# Patient Record
Sex: FEMALE | Race: WHITE | Employment: OTHER | URBAN - NONMETROPOLITAN AREA
[De-identification: names, ages, dates, MRNs, and addresses within clinical notes are randomized per-mention and may not be internally consistent; named-entity substitution may affect disease eponyms.]

---

## 2017-01-21 DIAGNOSIS — I10 HYPERTENSION GOAL BP (BLOOD PRESSURE) < 130/80: Primary | ICD-10-CM

## 2017-01-23 NOTE — TELEPHONE ENCOUNTER
Cozaar  Last Written Prescription Date: 8/26/2016  Last Fill Quantity: 90, # refills: 3  Last Office Visit with [facility], [facility] or [facility] prescribing provider: 3/3/2016       POTASSIUM   Date Value Ref Range Status   03/03/2016 4.3 3.4 - 5.3 mmol/L Final     CREATININE   Date Value Ref Range Status   03/03/2016 0.78 0.52 - 1.04 mg/dL Final     BP Readings from Last 3 Encounters:   03/28/16 122/86   03/03/16 112/68   04/16/15 113/78

## 2017-01-24 RX ORDER — LOSARTAN POTASSIUM 50 MG/1
TABLET ORAL
Qty: 90 TABLET | Refills: 0 | Status: SHIPPED | OUTPATIENT
Start: 2017-01-24 | End: 2017-03-24

## 2017-01-24 NOTE — TELEPHONE ENCOUNTER
Prescription approved per [facility] Refill Protocol.    Nieves Payne RN  [facility]

## 2017-02-15 DIAGNOSIS — Z93.2 STATUS POST ILEOSTOMY (H): ICD-10-CM

## 2017-02-16 NOTE — TELEPHONE ENCOUNTER
Routing refill request to provider for review/approval because:  Patient needs to be seen because:  Overdue for 6 month check    Nieves Payne RN  [facility]

## 2017-02-16 NOTE — TELEPHONE ENCOUNTER
Metformin          Last Written Prescription Date: 11/17/2016  Last Fill Quantity: 180, # refills: 0  Last Office Visit with [facility], [facility] or [facility] prescribing provider:  3/3/2016        BP Readings from Last 3 Encounters:   03/28/16 122/86   03/03/16 112/68   04/16/15 113/78     Lab Results   Component Value Date    MICROL 6 03/03/2016     No results found for: MICROALBUMIN  Creatinine   Date Value Ref Range Status   03/03/2016 0.78 0.52 - 1.04 mg/dL Final   ]  GFR Estimate   Date Value Ref Range Status   03/03/2016 78 >60 mL/min/1.7m2 Final     Comment:     Non  GFR Calc   02/23/2015 >90  Non  GFR Calc   >60 mL/min/1.7m2 Final   11/25/2014 82 >60 mL/min/1.7m2 Final     Comment:     Non  GFR Calc     GFR Estimate If Black   Date Value Ref Range Status   03/03/2016 >90   GFR Calc   >60 mL/min/1.7m2 Final   02/23/2015 >90   GFR Calc   >60 mL/min/1.7m2 Final   11/25/2014 >90   GFR Calc   >60 mL/min/1.7m2 Final     Lab Results   Component Value Date    CHOL 113 03/03/2016     Lab Results   Component Value Date    HDL 44 03/03/2016     Lab Results   Component Value Date    LDL 48 03/03/2016    LDL 76 08/15/2013     Lab Results   Component Value Date    TRIG 104 03/03/2016     Lab Results   Component Value Date    CHOLHDLRATIO 1.9 02/23/2015     Lab Results   Component Value Date    AST 15 03/03/2016     Lab Results   Component Value Date    ALT 17 03/03/2016     Lab Results   Component Value Date    A1C 6.0 03/03/2016    A1C 5.9 11/25/2014    A1C 6.0 05/01/2014    A1C 5.9 04/19/2013    A1C 6.0 09/04/2012     Potassium   Date Value Ref Range Status   03/03/2016 4.3 3.4 - 5.3 mmol/L Final

## 2017-03-15 DIAGNOSIS — Z93.2 STATUS POST ILEOSTOMY (H): Primary | ICD-10-CM

## 2017-03-16 NOTE — TELEPHONE ENCOUNTER
Ostomy supplies      Last Written Prescription Date: 9/15/16  Last Fill Quantity: 1box,  # refills: 3   Last Office Visit with [facility], [facility] or [facility] prescribing provider: 3/3/16                                         Next 5 appointments (look out 90 days)     Mar 24, 2017 11:00 AM CDT   [facility]   [facility]    [address] [facility] [address]   [phone]                 Nikole Ray MA     3/16/2017

## 2017-03-17 NOTE — TELEPHONE ENCOUNTER
Routing refill request to provider for review/approval because:  Drug not on the [facility] refill protocol     Nieves Payne RN  [facility]

## 2017-03-24 ENCOUNTER — OFFICE VISIT (OUTPATIENT)
Dept: FAMILY MEDICINE | Facility: OTHER | Age: 52
End: 2017-03-24
Payer: MEDICARE

## 2017-03-24 VITALS
HEIGHT: 64 IN | BODY MASS INDEX: 40.97 KG/M2 | WEIGHT: 240 LBS | DIASTOLIC BLOOD PRESSURE: 72 MMHG | TEMPERATURE: 96 F | HEART RATE: 106 BPM | OXYGEN SATURATION: 95 % | SYSTOLIC BLOOD PRESSURE: 112 MMHG

## 2017-03-24 DIAGNOSIS — F20.0 ACUTE EXACERBATION OF CHRONIC PARANOID SCHIZOPHRENIA (H): ICD-10-CM

## 2017-03-24 DIAGNOSIS — E66.01 OBESITY, CLASS III, BMI 40-49.9 (MORBID OBESITY) (H): ICD-10-CM

## 2017-03-24 DIAGNOSIS — Z93.2 ILEOSTOMY STATUS (H): ICD-10-CM

## 2017-03-24 DIAGNOSIS — Z93.2 STATUS POST ILEOSTOMY (H): ICD-10-CM

## 2017-03-24 DIAGNOSIS — I10 HYPERTENSION GOAL BP (BLOOD PRESSURE) < 130/80: ICD-10-CM

## 2017-03-24 DIAGNOSIS — E78.5 HYPERLIPIDEMIA LDL GOAL <100: ICD-10-CM

## 2017-03-24 DIAGNOSIS — E11.9 TYPE 2 DIABETES MELLITUS WITHOUT COMPLICATION, WITHOUT LONG-TERM CURRENT USE OF INSULIN (H): Primary | ICD-10-CM

## 2017-03-24 DIAGNOSIS — E03.4 HYPOTHYROIDISM DUE TO ACQUIRED ATROPHY OF THYROID: ICD-10-CM

## 2017-03-24 DIAGNOSIS — F15.20 METHAMPHETAMINE ADDICTION (H): ICD-10-CM

## 2017-03-24 DIAGNOSIS — F41.1 GENERALIZED ANXIETY DISORDER: ICD-10-CM

## 2017-03-24 LAB
CHOLEST SERPL-MCNC: 125 MG/DL
CREAT SERPL-MCNC: 0.76 MG/DL (ref 0.52–1.04)
CREAT UR-MCNC: 132 MG/DL
GFR SERPL CREATININE-BSD FRML MDRD: 80 ML/MIN/1.7M2
HBA1C MFR BLD: 5.8 % (ref 4.3–6)
HDLC SERPL-MCNC: 45 MG/DL
LDLC SERPL CALC-MCNC: 66 MG/DL
MICROALBUMIN UR-MCNC: 17 MG/L
MICROALBUMIN/CREAT UR: 12.73 MG/G CR (ref 0–25)
NONHDLC SERPL-MCNC: 80 MG/DL
TRIGL SERPL-MCNC: 70 MG/DL
TSH SERPL DL<=0.005 MIU/L-ACNC: 3.72 MU/L (ref 0.4–4)

## 2017-03-24 PROCEDURE — 82565 ASSAY OF CREATININE: CPT | Performed by: FAMILY MEDICINE

## 2017-03-24 PROCEDURE — 82043 UR ALBUMIN QUANTITATIVE: CPT | Performed by: FAMILY MEDICINE

## 2017-03-24 PROCEDURE — 80061 LIPID PANEL: CPT | Performed by: FAMILY MEDICINE

## 2017-03-24 PROCEDURE — 84443 ASSAY THYROID STIM HORMONE: CPT | Performed by: FAMILY MEDICINE

## 2017-03-24 PROCEDURE — 99214 OFFICE O/P EST MOD 30 MIN: CPT | Performed by: FAMILY MEDICINE

## 2017-03-24 PROCEDURE — 36415 COLL VENOUS BLD VENIPUNCTURE: CPT | Performed by: FAMILY MEDICINE

## 2017-03-24 PROCEDURE — 86803 HEPATITIS C AB TEST: CPT | Performed by: FAMILY MEDICINE

## 2017-03-24 PROCEDURE — 83036 HEMOGLOBIN GLYCOSYLATED A1C: CPT | Performed by: FAMILY MEDICINE

## 2017-03-24 RX ORDER — SIMVASTATIN 20 MG
TABLET ORAL
Qty: 90 TABLET | Refills: 3 | Status: SHIPPED | OUTPATIENT
Start: 2017-03-24 | End: 2018-03-23

## 2017-03-24 RX ORDER — SUCRALFATE ORAL 1 G/10ML
SUSPENSION ORAL
Qty: 100 ML | Refills: 6 | Status: SHIPPED | OUTPATIENT
Start: 2017-03-24 | End: 2018-03-08

## 2017-03-24 RX ORDER — LOSARTAN POTASSIUM 50 MG/1
TABLET ORAL
Qty: 90 TABLET | Refills: 3 | Status: SHIPPED | OUTPATIENT
Start: 2017-03-24 | End: 2018-04-10

## 2017-03-24 ASSESSMENT — PAIN SCALES - GENERAL: PAINLEVEL: NO PAIN (0)

## 2017-03-24 NOTE — PROGRESS NOTES
SUBJECTIVE:                                                    Sana Ward is a 52 year old female who presents to clinic today for the following health issues:      Diabetes Follow-up      Patient is checking blood sugars: not at all    Diabetic concerns: None     Symptoms of hypoglycemia (low blood sugar): none     Paresthesias (numbness or burning in feet) or sores: No     Date of last diabetic eye exam: 2 years ago     Hyperlipidemia Follow-Up      Rate your low fat/cholesterol diet?: good    Taking statin?  Yes, no muscle aches from statin    Other lipid medications/supplements?:  none     Hypertension Follow-up      Outpatient blood pressures are not being checked.    Low Salt Diet: low salt       Depression Followup    Status since last visit: Stable     See PHQ-9 for current symptoms.  Other associated symptoms: None    Complicating factors:   Significant life event:  No   Current substance abuse:  None  Anxiety or Panic symptoms:  No    PHQ-9  English PHQ-9   Any Language            Amount of exercise or physical activity: 6-7 days/week for an average of 15-30 minutes    Problems taking medications regularly: No    Medication side effects: none    Diet: low salt and low fat/cholesterol        Problem list and histories reviewed & adjusted, as indicated.  Additional history: none    Patient Active Problem List   Diagnosis     Hypertension goal BP (blood pressure) < 130/80     Hyperlipidemia LDL goal <100     Mental health disorder     Advanced directives, counseling/discussion     Generalized anxiety disorder     Type 2 diabetes mellitus without complication (H)     Hypothyroidism due to acquired atrophy of thyroid     Status post ileostomy (H)     Obesity, Class III, BMI 40-49.9 (morbid obesity) (H)     Methamphetamine addiction (H)     Acute exacerbation of chronic paranoid schizophrenia (H)     Past Surgical History:   Procedure Laterality Date     COLECTOMY  9/11/2001    total colectomy due to  "Crohn's     GI SURGERY  2003    has ostomy bag      HERNIA REPAIR      over 20 years ago       Social History   Substance Use Topics     Smoking status: Never Smoker     Smokeless tobacco: Never Used     Alcohol use No     Family History   Problem Relation Age of Onset     DIABETES Maternal Grandmother      DIABETES Maternal Grandfather          Current Outpatient Prescriptions   Medication Sig Dispense Refill     sucralfate (CARAFATE) 1 GM/10ML suspension APPLY A SMALL AMOUNT TO PARA STOMA 1-2 TIMES DAILY 100 mL 6     order for DME Equipment being ordered: Ostomy Supplies( Premier Drainable Pouch 1.125\") 1 Box 11     simvastatin (ZOCOR) 20 MG tablet TAKE 1 TABLET BY MOUTH RACHEL Y AT BEDTIME 30 tablet 0     metFORMIN (GLUCOPHAGE) 500 MG tablet Take 1 tablet (500 mg) by mouth 2 times daily (with meals) 180 tablet 0     losartan (COZAAR) 50 MG tablet TAKE 1 TABLET BY MOUTH EVER Y DAY 90 tablet 0     Skin Protectants, Misc. (NO STING BARRIER FILM) MISC USE EVERY DAY AS DIRECTED 25 each 3     Ostomy Supplies (ADAPT BARRIER RING 1-3/16\") MISC USE EVERY DAY AS DIRECTED 10 each 3     order for DME Equipment being ordered: Premier Drainable Pouch Misc    Ostomy Supplies 1 Box 3     levothyroxine (SYNTHROID, LEVOTHROID) 50 MCG tablet TAKE 1 TABLET BY MOUTH EVER Y DAY 90 tablet 3     Ostomy Supplies (SKIN GEL PROTECT DRESSING WIPE) PADS USE EVERY DAY AS DIRECTED 50 each 3     order for DME daily Premier Drain Pouch 1.125\" to be used every day as directed. 90 each 3     Ostomy Supplies (ADAPT) PSTE APPLY TO AFFECTED AREA AS D IRECTED 60 g 3     ORDER FOR DME Equipment being ordered: ostomy supplies  Premier Drainable Pouches MISC. 20 each 6     sertraline (ZOLOFT) 100 MG tablet Take 1 tablet (100 mg) by mouth daily 90 tablet 1     traZODone (DESYREL) 100 MG tablet Take 1 tablet (100 mg) by mouth At Bedtime 90 tablet 1     gabapentin (NEURONTIN) 100 MG capsule Take 100 mg capsule in Morning and 200 mg at bedtime. 180 capsule 1 "     haloperidol (HALDOL) 1 MG tablet Take 1 mg by mouth as needed As needed for hallucinations, voices       risperiDONE (RISPERDAL) 3 MG tablet Take 3.0 mg by mouth At Bedtime. 30 tablet 1     risperiDONE (RISPERDAL) 0.5 MG tablet Take 2 tablets (1 mg) by mouth 2 times daily Take 2 Tablets(1 mg) in the AM, and at  2 pm she needs to take 4 mg 30 tablet 1     LORazepam (ATIVAN) 0.25 MG TABS Take 0.25 mg by mouth At Bedtime.  20 tablet 0     ORDER FOR DME [place] ostomy bags to be used as directed. 1 Box 6     ORDER FOR DME Yassine Cohesive seals to be used every day as directed. 1 Box 3     [DISCONTINUED] ORDER FOR DME daily Skin gel protective dressing to be used every day as directed. 50 each 3     [DISCONTINUED] ORDER FOR DME by * route daily as needed Adapt Barrier rings to be used as directed. 30 each 3     [DISCONTINUED] ORDER FOR DME No Sting Barrier Film Wipe to be used every day as directed. 25 each 3     Allergies   Allergen Reactions     Amoxicillin      Contrast Dye      Sulfa Drugs Hives     Recent Labs   Lab Test  03/03/16   0851  02/23/15   1119  11/25/14   1018   05/01/14   1119   A1C  6.0   --   5.9   --   6.0   LDL  48  31  78   --    --    HDL  44*  52  48*   --    --    TRIG  104  76  98   --    --    ALT  17  21  17   --    --    CR  0.78  0.65  0.75   < >  1.22*   GFRESTIMATED  78  >90  Non  GFR Calc    82   < >  47*   GFRESTBLACK  >90   GFR Calc    >90   GFR Calc    >90   GFR Calc     < >  57*   POTASSIUM  4.3  3.7  4.2   --    --    TSH  5.06*  3.73   --    --    --     < > = values in this interval not displayed.      BP Readings from Last 3 Encounters:   03/24/17 112/72   03/28/16 122/86   03/03/16 112/68    Wt Readings from Last 3 Encounters:   03/24/17 240 lb (108.9 kg)   03/03/16 242 lb 3.2 oz (109.9 kg)   04/16/15 235 lb 4.8 oz (106.7 kg)                  Labs reviewed in EPIC    Reviewed and updated as needed this visit by  "clinical staff  Tobacco  Allergies  Meds  Problems  Soc Hx      Reviewed and updated as needed this visit by Provider  Allergies  Meds  Problems         ROS:  C: NEGATIVE for fever, chills, change in weight  E/M: NEGATIVE for ear, mouth and throat problems  R: NEGATIVE for significant cough or SOB  CV: NEGATIVE for chest pain, palpitations or peripheral edema  ROS otherwise negative    OBJECTIVE:                                                    /72 (BP Location: Left arm, Patient Position: Chair, Cuff Size: Adult Large)  Pulse 106  Temp 96  F (35.6  C) (Tympanic)  Ht 5' 3.5\" (1.613 m)  Wt 240 lb (108.9 kg)  SpO2 95%  BMI 41.85 kg/m2  Body mass index is 41.85 kg/(m^2).  GENERAL: alert, no distress and obese  HENT: ear canals and TM's normal, nose and mouth without ulcers or lesions  NECK: no adenopathy, no asymmetry, masses, or scars and thyroid normal to palpation  RESP: lungs clear to auscultation - no rales, rhonchi or wheezes  CV: regular rate and rhythm, normal S1 S2, no S3 or S4, no murmur, click or rub, no peripheral edema and peripheral pulses strong  ABDOMEN: soft, nontender, without hepatosplenomegaly or masses, bowel sounds normal and ileostomy intact  MS: no gross musculoskeletal defects noted, no edema  Diabetic foot exam: normal DP and PT pulses, no trophic changes or ulcerative lesions and normal sensory exam    Diagnostic Test Results:  Results for orders placed or performed in visit on 03/24/17   Hemoglobin A1c   Result Value Ref Range    Hemoglobin A1C 5.8 4.3 - 6.0 %   Lipid Profile with reflex to direct LDL   Result Value Ref Range    Cholesterol 125 <200 mg/dL    Triglycerides 70 <150 mg/dL    HDL Cholesterol 45 (L) >49 mg/dL    LDL Cholesterol Calculated 66 <100 mg/dL    Non HDL Cholesterol 80 <130 mg/dL   Albumin Random Urine Quantitative   Result Value Ref Range    Creatinine Urine 132 mg/dL    Albumin Urine mg/L 17 mg/L    Albumin Urine mg/g Cr 12.73 0 - 25 mg/g Cr "   Creatinine   Result Value Ref Range    Creatinine 0.76 0.52 - 1.04 mg/dL    GFR Estimate 80 >60 mL/min/1.7m2    GFR Estimate If Black >90   GFR Calc   >60 mL/min/1.7m2   TSH with free T4 reflex   Result Value Ref Range    TSH 3.72 0.40 - 4.00 mU/L        ASSESSMENT/PLAN:                                                        1. Status post ileostomy  Chronic stable output and function. Supplies refilled for ongoing management.  - sucralfate (CARAFATE) 1 GM/10ML suspension; APPLY A SMALL AMOUNT TO PARA STOMA 1-2 TIMES DAILY  Dispense: 100 mL; Refill: 6    2. Type 2 diabetes mellitus without complication, without long-term current use of insulin (H)  Chronic controlled. The current medical regimen is effective;  continue present plan and medications. Recheck in 6 months.  - Hemoglobin A1c  - Lipid Profile with reflex to direct LDL  - Albumin Random Urine Quantitative  - Creatinine    3. Obesity, Class III, BMI 40-49.9 (morbid obesity) (H)  Chronic. Stable. Healthy diet and exercise reviewed.  Limit pop and juice intake.  Risks of obesity discussed.  Encourage exercise.      4. Generalized anxiety disorder  Chronic stable. She is currently followed by psychiatry for her medications.  - DEPRESSION ACTION PLAN (DAP)    5. Hypothyroidism due to acquired atrophy of thyroid  Chronic stable. The current medical regimen is effective;  continue present plan and medications.  - TSH with free T4 reflex      7. Hypertension goal BP (blood pressure) < 130/80  Chronic controlled. The current medical regimen is effective;  continue present plan and medications.  - losartan (COZAAR) 50 MG tablet; TAKE 1 TABLET BY MOUTH EVER Y DAY  Dispense: 90 tablet; Refill: 3    8. Hyperlipidemia LDL goal <100  Chronic controlled. The current medical regimen is effective;  continue present plan and medications.  - simvastatin (ZOCOR) 20 MG tablet; TAKE 1 TABLET BY MOUTH RACHEL Y AT BEDTIME  Dispense: 90 tablet; Refill: 3    10.  Acute exacerbation of chronic paranoid schizophrenia (H)  Chronic stable. She is currently followed by psychiatry for her medications.    11. Methamphetamine addiction (H)  Prior history and currently not active problem.      FUTURE LABS:       - Schedule non-fasting labs in 6 months  FUTURE APPOINTMENTS:       - Follow-up visit in 6 months for comprehensive PE and pap smear.  SELF MONITORING:       - Please check blood glucose readings several times per week       - Please check blood pressure readings several times per week  Work on weight loss  Regular exercise    Beck Martinez MD  [facility]

## 2017-03-24 NOTE — MR AVS SNAPSHOT
"              After Visit Summary   3/24/2017    Sana Ward    MRN: [id number]           Patient Information     Date Of Birth          1965        Visit Information        Provider Department      3/24/2017 11:00 AM Beck Martinez MD [facility]        Today's Diagnoses     Type 2 diabetes mellitus without complication, without long-term current use of insulin (H)    -  1    Status post ileostomy        Obesity, Class III, BMI 40-49.9 (morbid obesity) (H)        Generalized anxiety disorder        Hypothyroidism due to acquired atrophy of thyroid        Status post ileostomy (H)        Hypertension goal BP (blood pressure) < 130/80        Hyperlipidemia LDL goal <100        Ileostomy status (H)           Follow-ups after your visit        Follow-up notes from your care team     Return in about 6 months (around 9/24/2017) for Physical Exam, Lab Work pap smear.      Who to contact     If you have questions or need follow up information about today's clinic visit or your schedule please contact [facility] directly at [phone].  Normal or non-critical lab and imaging results will be communicated to you by MagForcehart, letter or phone within 4 business days after the clinic has received the results. If you do not hear from us within 7 days, please contact the clinic through Reduxiot or phone. If you have a critical or abnormal lab result, we will notify you by phone as soon as possible.  Submit refill requests through Proxeon or call your pharmacy and they will forward the refill request to us. Please allow 3 business days for your refill to be completed.          Additional Information About Your Visit        MagForcehart Information     Proxeon lets you send messages to your doctor, view your test results, renew your prescriptions, schedule appointments and more. To sign up, go to [email or web address] . Click on \"Log in\" on the left side of the screen, which will take you to the " "Welcome page. Then click on \"Sign up Now\" on the right side of the page.     You will be asked to enter the access code listed below, as well as some personal information. Please follow the directions to create your username and password.     Your access code is: [email or web address]  Expires: 2017 11:55 AM     Your access code will  in 90 days. If you need help or a new code, please call your [facility] clinic or [phone].        Care EveryWhere ID     This is your Care EveryWhere ID. This could be used by other organizations to access your [facility] medical records  [id number]        Your Vitals Were     Pulse Temperature Height Pulse Oximetry BMI (Body Mass Index)       106 96  F (35.6  C) (Tympanic) 5' 3.5\" (1.613 m) 95% 41.85 kg/m2        Blood Pressure from Last 3 Encounters:   17 112/72   16 122/86   16 112/68    Weight from Last 3 Encounters:   17 240 lb (108.9 kg)   16 242 lb 3.2 oz (109.9 kg)   04/16/15 235 lb 4.8 oz (106.7 kg)              We Performed the Following     Albumin Random Urine Quantitative     Creatinine     DEPRESSION ACTION PLAN (DAP)     Hemoglobin A1c     Hepatitis C antibody     Lipid Profile with reflex to direct LDL     TSH with free T4 reflex          Today's Medication Changes          These changes are accurate as of: 3/24/17 11:55 AM.  If you have any questions, ask your nurse or doctor.               These medicines have changed or have updated prescriptions.        Dose/Directions    losartan 50 MG tablet   Commonly known as:  COZAAR   This may have changed:  See the new instructions.   Used for:  Hypertension goal BP (blood pressure) < 130/80   Changed by:  Beck Martinez MD        TAKE 1 TABLET BY MOUTH EVER Y DAY   Quantity:  90 tablet   Refills:  3       simvastatin 20 MG tablet   Commonly known as:  ZOCOR   This may have changed:  See the new instructions.   Used for:  Hyperlipidemia LDL goal <100, Status post ileostomy (H), Ileostomy " "status (H)   Changed by:  Beck Martinez MD        TAKE 1 TABLET BY MOUTH RACHEL Y AT BEDTIME   Quantity:  90 tablet   Refills:  3            Where to get your medicines      These medications were sent to [id number] - [place], [place] - [address] [place] [address]  [address] [place] [id number]    Hours:  M-F 8:30-6:30; Sat 9-4; closed Sunday Phone:  [phone]     losartan 50 MG tablet    metFORMIN 500 MG tablet    simvastatin 20 MG tablet    sucralfate 1 GM/10ML suspension                Primary Care Provider Office Phone # Fax #    Beck Martinez [phone]       [facility] [address] [facility] [address]        Thank you!     Thank you for choosing [facility]  for your care. Our goal is always to provide you with excellent care. Hearing back from our patients is one way we can continue to improve our services. Please take a few minutes to complete the written survey that you may receive in the mail after your visit with us. Thank you!             Your Updated Medication List - Protect others around you: Learn how to safely use, store and throw away your medicines at www.disposemymeds.org.          This list is accurate as of: 3/24/17 11:55 AM.  Always use your most recent med list.                   Brand Name Dispense Instructions for use    * ADAPT Pste     60 g    APPLY TO AFFECTED AREA AS D IRECTED       * SKIN GEL PROTECT DRESSING WIPE Pads     50 each    USE EVERY DAY AS DIRECTED       * ADAPT BARRIER RING 1-3/16\" Misc     10 each    USE EVERY DAY AS DIRECTED       gabapentin 100 MG capsule    NEURONTIN    180 capsule    Take 100 mg capsule in Morning and 200 mg at bedtime.       haloperidol 1 MG tablet    HALDOL     Take 1 mg by mouth as needed As needed for hallucinations, voices       levothyroxine 50 MCG tablet    SYNTHROID/LEVOTHROID    90 tablet    TAKE 1 TABLET BY MOUTH EVER Y DAY       LORazepam 0.25 MG Tabs half-tab    ATIVAN    20 tablet    Take 0.25 mg " "by mouth At Bedtime.       losartan 50 MG tablet    COZAAR    90 tablet    TAKE 1 TABLET BY MOUTH EVER Y DAY       metFORMIN 500 MG tablet    GLUCOPHAGE    180 tablet    Take 1 tablet (500 mg) by mouth 2 times daily (with meals)       NO STING BARRIER FILM Misc     25 each    USE EVERY DAY AS DIRECTED       * order for DME     1 Box    Yassine Cohesive seals to be used every day as directed.       * order for DME     1 Box    [place] ostomy bags to be used as directed.       * order for DME     20 each    Equipment being ordered: ostomy supplies Premier Drainable Pouches MISC.       * order for DME     90 each    daily Premier Drain Pouch 1.125\" to be used every day as directed.       * order for DME     1 Box    Equipment being ordered: Premier Drainable Pouch Misc  Ostomy Supplies       * order for DME     1 Box    Equipment being ordered: Ostomy Supplies( Premier Drainable Pouch 1.125\")       * risperiDONE 3 MG tablet    risperDAL    30 tablet    Take 3.0 mg by mouth At Bedtime.       * risperiDONE 0.5 MG tablet    risperDAL    30 tablet    Take 2 tablets (1 mg) by mouth 2 times daily Take 2 Tablets(1 mg) in the AM, and at  2 pm she needs to take 4 mg       sertraline 100 MG tablet    ZOLOFT    90 tablet    Take 1 tablet (100 mg) by mouth daily       simvastatin 20 MG tablet    ZOCOR    90 tablet    TAKE 1 TABLET BY MOUTH RACHEL Y AT BEDTIME       sucralfate 1 GM/10ML suspension    CARAFATE    100 mL    APPLY A SMALL AMOUNT TO PARA STOMA 1-2 TIMES DAILY       traZODone 100 MG tablet    DESYREL    90 tablet    Take 1 tablet (100 mg) by mouth At Bedtime       * Notice:  This list has 11 medication(s) that are the same as other medications prescribed for you. Read the directions carefully, and ask your doctor or other care provider to review them with you.      "

## 2017-03-24 NOTE — NURSING NOTE
"Chief Complaint   Patient presents with     Recheck Medication       Initial /72 (BP Location: Left arm, Patient Position: Chair, Cuff Size: Adult Large)  Pulse 106  Temp 96  F (35.6  C) (Tympanic)  Ht 5' 3.5\" (1.613 m)  Wt 240 lb (108.9 kg)  SpO2 95%  BMI 41.85 kg/m2 Estimated body mass index is 41.85 kg/(m^2) as calculated from the following:    Height as of this encounter: 5' 3.5\" (1.613 m).    Weight as of this encounter: 240 lb (108.9 kg).  Medication Reconciliation: complete   Maday SPIVEY      "

## 2017-03-24 NOTE — LETTER
[facility]  [address] [facility] [address]  Phone: [phone]          March 27, 2017    Sana Ward  [address]   [facility] [address]          Dear Sana,      LAB RESULTS:     Lipid profile, A1C, renal function, thyroid function and one time hepatitis c screening are all stable. The current medical regimen is effective;  continue present plan and medications  If you have any further questions or problems, please contact our office.          Sincerely,      Beck Martinez M.D.

## 2017-03-24 NOTE — LETTER
My Depression Action Plan  Name: Sana Ward   Date of Birth 1965  Date: 3/24/2017    My doctor: Beck Martinez   My clinic: [place] [address] [facility] [address] [phone]          GREEN    ZONE   Good Control    What it looks like:     Things are going generally well. You have normal up s and down s. You may even feel depressed from time to time, but bad moods usually last less than a day.   What you need to do:  1. Continue to care for yourself (see self care plan)  2. Check your depression survival kit and update it as needed  3. Follow your physician s recommendations including any medication.  4. Do not stop taking medication unless you consult with your physician first.           YELLOW         ZONE Getting Worse    What it looks like:     Depression is starting to interfere with your life.     It may be hard to get out of bed; you may be starting to isolate yourself from others.    Symptoms of depression are starting to last most all day and this has happened for several days.     You may have suicidal thoughts but they are not constant.   What you need to do:     1. Call your care team, your response to treatment will improve if you keep your care team informed of your progress. Yellow periods are signs an adjustment may need to be made.     2. Continue your self-care, even if you have to fake it!    3. Talk to someone in your support network    4. Open up your depression survival kit           RED    ZONE Medical Alert - Get Help    What it looks like:     Depression is seriously interfering with your life.     You may experience these or other symptoms: You can t get out of bed most days, can t work or engage in other necessary activities, you have trouble taking care of basic hygiene, or basic responsibilities, thoughts of suicide or death that will not go away, self-injurious behavior.     What you need to do:  1. Call your care team and request a  same-day appointment. If they are not available (weekends or after hours) call your local crisis line, emergency room or 911.      Electronically signed by: Maday Rashid, March 24, 2017    Depression Self Care Plan / Survival Kit    Self-Care for Depression  Here s the deal. Your body and mind are really not as separate as most people think.  What you do and think affects how you feel and how you feel influences what you do and think. This means if you do things that people who feel good do, it will help you feel better.  Sometimes this is all it takes.  There is also a place for medication and therapy depending on how severe your depression is, so be sure to consult with your medical provider and/ or Behavioral Health Consultant if your symptoms are worsening or not improving.     In order to better manage my stress, I will:    Exercise  Get some form of exercise, every day. This will help reduce pain and release endorphins, the  feel good  chemicals in your brain. This is almost as good as taking antidepressants!  This is not the same as joining a gym and then never going! (they count on that by the way ) It can be as simple as just going for a walk or doing some gardening, anything that will get you moving.      Hygiene   Maintain good hygiene (Get out of bed in the morning, Make your bed, Brush your teeth, Take a shower, and Get dressed like you were going to work, even if you are unemployed).  If your clothes don't fit try to get ones that do.    Diet  I will strive to eat foods that are good for me, drink plenty of water, and avoid excessive sugar, caffeine, alcohol, and other mood-altering substances.  Some foods that are helpful in depression are: complex carbohydrates, B vitamins, flaxseed, fish or fish oil, fresh fruits and vegetables.    Psychotherapy  I agree to participate in Individual Therapy (if recommended).    Medication  If prescribed medications, I agree to take them.  Missing doses can result  in serious side effects.  I understand that drinking alcohol, or other illicit drug use, may cause potential side effects.  I will not stop my medication abruptly without first discussing it with my provider.    Staying Connected With Others  I will stay in touch with my friends, family members, and my primary care provider/team.    Use your imagination  Be creative.  We all have a creative side; it doesn t matter if it s oil painting, sand castles, or mud pies! This will also kick up the endorphins.    Witness Beauty  (AKA stop and smell the roses) Take a look outside, even in mid-winter. Notice colors, textures. Watch the squirrels and birds.     Service to others  Be of service to others.  There is always someone else in need.  By helping others we can  get out of ourselves  and remember the really important things.  This also provides opportunities for practicing all the other parts of the program.    Humor  Laugh and be silly!  Adjust your TV habits for less news and crime-drama and more comedy.    Control your stress  Try breathing deep, massage therapy, biofeedback, and meditation. Find time to relax each day.     My support system    Clinic Contact:  Phone number:    Contact 1:  Phone number:    Contact 2:  Phone number:    [employer]ri[employer]/:  Phone number:    Therapist:  Phone number:    Local crisis center:    Phone number:    Other community support:  Phone number:

## 2017-03-25 ASSESSMENT — PATIENT HEALTH QUESTIONNAIRE - PHQ9: SUM OF ALL RESPONSES TO PHQ QUESTIONS 1-9: 0

## 2017-03-26 RX ORDER — LEVOTHYROXINE SODIUM 50 UG/1
TABLET ORAL
Qty: 90 TABLET | Refills: 3 | Status: SHIPPED | OUTPATIENT
Start: 2017-03-26 | End: 2018-05-07

## 2017-03-27 LAB — HCV AB SERPL QL IA: NORMAL

## 2017-03-27 NOTE — PROGRESS NOTES
Please notify patient, call or letter.  Lipid profile, A1C, renal function, thyroid function and one time hepatitis c screening are all stable. The current medical regimen is effective;  continue present plan and medications.  Electronically signed by Beck Martinez MD

## 2017-04-27 DIAGNOSIS — Z93.2 STATUS POST ILEOSTOMY (H): ICD-10-CM

## 2017-04-28 RX ORDER — OSTOMY SUPPLY
PASTE (GRAM) MISCELLANEOUS
Qty: 60 EACH | Refills: 3 | Status: SHIPPED | OUTPATIENT
Start: 2017-04-28 | End: 2017-09-14

## 2017-04-28 NOTE — TELEPHONE ENCOUNTER
Ostomy supplies      Last Written Prescription Date:  10/15/15  Last Fill Quantity: 60 g,   # refills: 3  Last Office Visit with [facility] prescribing provider: 3/24/17  Future Office visit:       Routing refill request to provider for review/approval because:  Drug not on the [facility] refill protocol or controlled substance

## 2017-05-15 ENCOUNTER — OFFICE VISIT (OUTPATIENT)
Dept: FAMILY MEDICINE | Facility: OTHER | Age: 52
End: 2017-05-15
Payer: MEDICARE

## 2017-05-15 VITALS
RESPIRATION RATE: 18 BRPM | WEIGHT: 245.8 LBS | BODY MASS INDEX: 42.86 KG/M2 | HEART RATE: 115 BPM | TEMPERATURE: 97.5 F | DIASTOLIC BLOOD PRESSURE: 78 MMHG | SYSTOLIC BLOOD PRESSURE: 112 MMHG | OXYGEN SATURATION: 95 %

## 2017-05-15 DIAGNOSIS — R30.0 DYSURIA: Primary | ICD-10-CM

## 2017-05-15 DIAGNOSIS — R82.90 NONSPECIFIC FINDING ON EXAMINATION OF URINE: ICD-10-CM

## 2017-05-15 LAB
ALBUMIN UR-MCNC: NEGATIVE MG/DL
APPEARANCE UR: CLEAR
BACTERIA #/AREA URNS HPF: ABNORMAL /HPF
BILIRUB UR QL STRIP: NEGATIVE
COLOR UR AUTO: YELLOW
GLUCOSE UR STRIP-MCNC: NEGATIVE MG/DL
HGB UR QL STRIP: NEGATIVE
KETONES UR STRIP-MCNC: NEGATIVE MG/DL
LEUKOCYTE ESTERASE UR QL STRIP: ABNORMAL
NITRATE UR QL: NEGATIVE
PH UR STRIP: 6 PH (ref 5–7)
RBC #/AREA URNS AUTO: ABNORMAL /HPF (ref 0–2)
SP GR UR STRIP: <=1.005 (ref 1–1.03)
URN SPEC COLLECT METH UR: ABNORMAL
UROBILINOGEN UR STRIP-ACNC: 0.2 EU/DL (ref 0.2–1)
WBC #/AREA URNS AUTO: ABNORMAL /HPF (ref 0–2)

## 2017-05-15 PROCEDURE — 99213 OFFICE O/P EST LOW 20 MIN: CPT | Performed by: FAMILY MEDICINE

## 2017-05-15 PROCEDURE — 87086 URINE CULTURE/COLONY COUNT: CPT | Performed by: FAMILY MEDICINE

## 2017-05-15 PROCEDURE — 81001 URINALYSIS AUTO W/SCOPE: CPT | Performed by: FAMILY MEDICINE

## 2017-05-15 RX ORDER — CIPROFLOXACIN 250 MG/1
250 TABLET, FILM COATED ORAL 2 TIMES DAILY
Qty: 6 TABLET | Refills: 0 | Status: SHIPPED | OUTPATIENT
Start: 2017-05-15 | End: 2017-09-25

## 2017-05-15 ASSESSMENT — PAIN SCALES - GENERAL: PAINLEVEL: NO PAIN (0)

## 2017-05-15 NOTE — MR AVS SNAPSHOT
"              After Visit Summary   5/15/2017    Sana Ward    MRN: [id number]           Patient Information     Date Of Birth          1965        Visit Information        Provider Department      5/15/2017 2:10 PM Beck Martinez MD [facility]        Today's Diagnoses     Dysuria    -  1    Nonspecific finding on examination of urine           Follow-ups after your visit        Follow-up notes from your care team     Return if symptoms worsen or fail to improve.      Your next 10 appointments already scheduled     Sep 25, 2017 11:20 AM CDT   Office Visit with Beck Martinez MD   [facility] ([facility])    [address] [facility] [id number] [phone]           Bring a current list of meds and any records pertaining to this visit.  For Physicals, please bring immunization records and any forms needing to be filled out.  Please arrive 10 minutes early to complete paperwork.              Who to contact     If you have questions or need follow up information about today's clinic visit or your schedule please contact [facility] directly at [phone].  Normal or non-critical lab and imaging results will be communicated to you by MyChart, letter or phone within 4 business days after the clinic has received the results. If you do not hear from us within 7 days, please contact the clinic through [employer] or phone. If you have a critical or abnormal lab result, we will notify you by phone as soon as possible.  Submit refill requests through [employer] or call your pharmacy and they will forward the refill request to us. Please allow 3 business days for your refill to be completed.          Additional Information About Your Visit        G[employer] Information     [employer] lets you send messages to your doctor, view your test results, renew your prescriptions, schedule appointments and more. To sign up, go to [email or web address] . Click on \"Log in\" on the " "left side of the screen, which will take you to the Welcome page. Then click on \"Sign up Now\" on the right side of the page.     You will be asked to enter the access code listed below, as well as some personal information. Please follow the directions to create your username and password.     Your access code is: [email or web address]  Expires: 2017 11:55 AM     Your access code will  in 90 days. If you need help or a new code, please call your [facility] clinic or [phone].        Care EveryWhere ID     This is your Care EveryWhere ID. This could be used by other organizations to access your [facility] medical records  [id number]        Your Vitals Were     Pulse Temperature Respirations Pulse Oximetry BMI (Body Mass Index)       115 97.5  F (36.4  C) (Temporal) 18 95% 42.86 kg/m2        Blood Pressure from Last 3 Encounters:   05/15/17 112/78   17 112/72   16 122/86    Weight from Last 3 Encounters:   05/15/17 245 lb 12.8 oz (111.5 kg)   17 240 lb (108.9 kg)   16 242 lb 3.2 oz (109.9 kg)              We Performed the Following     *UA reflex to Microscopic and Culture ([facility] (except [place] and [place])     Urine Culture Aerobic Bacterial     Urine Microscopic          Today's Medication Changes          These changes are accurate as of: 5/15/17  2:31 PM.  If you have any questions, ask your nurse or doctor.               Start taking these medicines.        Dose/Directions    ciprofloxacin 250 MG tablet   Commonly known as:  CIPRO   Used for:  Dysuria   Started by:  Beck Martinez MD        Dose:  250 mg   Take 1 tablet (250 mg) by mouth 2 times daily   Quantity:  6 tablet   Refills:  0            Where to get your medicines      These medications were sent to [id number] [place] - [address] [place] [address]  [address] [place] [address] [place] [address]    Hours:  M-F 8:30-6:30; Sat 9-4; closed  Phone:  [phone]     ciprofloxacin 250 MG tablet             "    Primary Care Provider Office Phone # Fax #    Beck Martinez [phone]       [facility] [address] [facility] [address]        Thank you!     Thank you for choosing [facility]  for your care. Our goal is always to provide you with excellent care. Hearing back from our patients is one way we can continue to improve our services. Please take a few minutes to complete the written survey that you may receive in the mail after your visit with us. Thank you!             Your Updated Medication List - Protect others around you: Learn how to safely use, store and throw away your medicines at www.disposemymeds.org.          This list is accurate as of: 5/15/17  2:31 PM.  Always use your most recent med list.                   Brand Name Dispense Instructions for use    ciprofloxacin 250 MG tablet    CIPRO    6 tablet    Take 1 tablet (250 mg) by mouth 2 times daily       gabapentin 100 MG capsule    NEURONTIN    180 capsule    Take 100 mg capsule in Morning and 200 mg at bedtime.       haloperidol 1 MG tablet    HALDOL     Take 1 mg by mouth as needed As needed for hallucinations, voices       levothyroxine 50 MCG tablet    SYNTHROID/LEVOTHROID    90 tablet    TAKE 1 TABLET BY MOUTH EVER Y DAY       LORazepam 0.25 mg Tabs half-tab    ATIVAN    20 tablet    Take 0.25 mg by mouth At Bedtime.       losartan 50 MG tablet    COZAAR    90 tablet    TAKE 1 TABLET BY MOUTH EVER Y DAY       metFORMIN 500 MG tablet    GLUCOPHAGE    180 tablet    Take 1 tablet (500 mg) by mouth 2 times daily (with meals)       NO STING BARRIER FILM Misc     25 each    USE EVERY DAY AS DIRECTED       * order for DME     1 Box    Yassine Cohesive seals to be used every day as directed.       * order for DME     1 Box    Sekou ostomy bags to be used as directed.       * order for DME     20 each    Equipment being ordered: ostomy supplies Premier Drainable Pouches MISC.       * order for DME     90 each     "daily Premier Drain Pouch 1.125\" to be used every day as directed.       * order for DME     1 Box    Equipment being ordered: Premier Drainable Pouch Misc  Ostomy Supplies       * order for DME     1 Box    Equipment being ordered: Ostomy Supplies( Premier Drainable Pouch 1.125\")       * risperiDONE 3 MG tablet    risperDAL    30 tablet    Take 3.0 mg by mouth At Bedtime.       * risperiDONE 0.5 MG tablet    risperDAL    30 tablet    Take 2 tablets (1 mg) by mouth 2 times daily Take 2 Tablets(1 mg) in the AM, and at  2 pm she needs to take 4 mg       sertraline 100 MG tablet    ZOLOFT    90 tablet    Take 1 tablet (100 mg) by mouth daily       simvastatin 20 MG tablet    ZOCOR    90 tablet    TAKE 1 TABLET BY MOUTH RACHEL Y AT BEDTIME       * SKIN GEL PROTECT DRESSING WIPE Pads     50 each    USE EVERY DAY AS DIRECTED       * ADAPT BARRIER RING 1-3/16\" Misc     10 each    USE EVERY DAY AS DIRECTED       * ADAPT Pste     60 each    APPLY TO AFFECTED AREA AS DIRECTED       sucralfate 1 GM/10ML suspension    CARAFATE    100 mL    APPLY A SMALL AMOUNT TO PARA STOMA 1-2 TIMES DAILY       traZODone 100 MG tablet    DESYREL    90 tablet    Take 1 tablet (100 mg) by mouth At Bedtime       * Notice:  This list has 11 medication(s) that are the same as other medications prescribed for you. Read the directions carefully, and ask your doctor or other care provider to review them with you.      "

## 2017-05-15 NOTE — PROGRESS NOTES
SUBJECTIVE:                                                    Sana Ward is a 52 year old female who presents to clinic today for the following health issues:        URINARY TRACT SYMPTOMS     Onset: 4 days    Description:   Painful urination (Dysuria): no   Blood in urine (Hematuria): no   Delay in urine (Hesitency): no     Intensity: mild    Progression of Symptoms:  same    Accompanying Signs & Symptoms:  Fever/chills: no   Flank pain YES  Nausea and vomiting: no   Any vaginal symptoms: none  Abdominal/Pelvic Pain: no    History:   History of frequent UTI's: YES  History of kidney stones: no   Sexually Active: no   Possibility of pregnancy: No    Precipitating factors:   none         Therapies Tried and outcome: Increase fluid intake and Cranberry juice prn (contraindicated in Coumadin patients)          Problem list and histories reviewed & adjusted, as indicated.  Additional history: as documented    Patient Active Problem List   Diagnosis     Hypertension goal BP (blood pressure) < 130/80     Hyperlipidemia LDL goal <100     Mental health disorder     Advanced directives, counseling/discussion     Generalized anxiety disorder     Type 2 diabetes mellitus without complication (H)     Hypothyroidism due to acquired atrophy of thyroid     Status post ileostomy (H)     Obesity, Class III, BMI 40-49.9 (morbid obesity) (H)     Methamphetamine addiction (H)     Acute exacerbation of chronic paranoid schizophrenia (H)     Past Surgical History:   Procedure Laterality Date     COLECTOMY  9/11/2001    total colectomy due to Crohn's     GI SURGERY  2003    has ostomy bag      HERNIA REPAIR      over 20 years ago       Social History   Substance Use Topics     Smoking status: Never Smoker     Smokeless tobacco: Never Used     Alcohol use No     Family History   Problem Relation Age of Onset     DIABETES Maternal Grandmother      DIABETES Maternal Grandfather          Current Outpatient Prescriptions   Medication  "Sig Dispense Refill     Ostomy Supplies (ADAPT) PSTE APPLY TO AFFECTED AREA AS DIRECTED 60 each 3     levothyroxine (SYNTHROID/LEVOTHROID) 50 MCG tablet TAKE 1 TABLET BY MOUTH EVER Y DAY 90 tablet 3     metFORMIN (GLUCOPHAGE) 500 MG tablet Take 1 tablet (500 mg) by mouth 2 times daily (with meals) 180 tablet 3     losartan (COZAAR) 50 MG tablet TAKE 1 TABLET BY MOUTH EVER Y DAY 90 tablet 3     simvastatin (ZOCOR) 20 MG tablet TAKE 1 TABLET BY MOUTH RACHEL Y AT BEDTIME 90 tablet 3     order for DME Equipment being ordered: Ostomy Supplies( Premier Drainable Pouch 1.125\") 1 Box 11     Skin Protectants, Misc. (NO STING BARRIER FILM) MISC USE EVERY DAY AS DIRECTED 25 each 3     Ostomy Supplies (ADAPT BARRIER RING 1-3/16\") MISC USE EVERY DAY AS DIRECTED 10 each 3     order for DME Equipment being ordered: Premier Drainable Pouch Misc    Ostomy Supplies 1 Box 3     Ostomy Supplies (SKIN GEL PROTECT DRESSING WIPE) PADS USE EVERY DAY AS DIRECTED 50 each 3     order for DME daily Premier Drain Pouch 1.125\" to be used every day as directed. 90 each 3     ORDER FOR DME Equipment being ordered: ostomy supplies  Premier Drainable Pouches MISC. 20 each 6     sertraline (ZOLOFT) 100 MG tablet Take 1 tablet (100 mg) by mouth daily 90 tablet 1     traZODone (DESYREL) 100 MG tablet Take 1 tablet (100 mg) by mouth At Bedtime 90 tablet 1     gabapentin (NEURONTIN) 100 MG capsule Take 100 mg capsule in Morning and 200 mg at bedtime. 180 capsule 1     haloperidol (HALDOL) 1 MG tablet Take 1 mg by mouth as needed As needed for hallucinations, voices       risperiDONE (RISPERDAL) 3 MG tablet Take 3.0 mg by mouth At Bedtime. 30 tablet 1     risperiDONE (RISPERDAL) 0.5 MG tablet Take 2 tablets (1 mg) by mouth 2 times daily Take 2 Tablets(1 mg) in the AM, and at  2 pm she needs to take 4 mg 30 tablet 1     LORazepam (ATIVAN) 0.25 MG TABS Take 0.25 mg by mouth At Bedtime.  20 tablet 0     ORDER FOR DME Sekou ostomy bags to be used as " directed. 1 Box 6     ORDER FOR DME Yassine Cohesive seals to be used every day as directed. 1 Box 3     sucralfate (CARAFATE) 1 GM/10ML suspension APPLY A SMALL AMOUNT TO PARA STOMA 1-2 TIMES DAILY (Patient not taking: Reported on 5/15/2017) 100 mL 6     Allergies   Allergen Reactions     Amoxicillin      Contrast Dye      Sulfa Drugs Hives     Recent Labs   Lab Test  03/24/17   1135  03/03/16   0851  02/23/15   1119  11/25/14   1018   A1C  5.8  6.0   --   5.9   LDL  66  48  31  78   HDL  45*  44*  52  48*   TRIG  70  104  76  98   ALT   --   17 21 17   CR  0.76  0.78  0.65  0.75   GFRESTIMATED  80  78  >90  Non  GFR Calc    82   GFRESTBLACK  >90   GFR Calc    >90   GFR Calc    >90   GFR Calc    >90   GFR Calc     POTASSIUM   --   4.3  3.7  4.2   TSH  3.72  5.06*  3.73   --       BP Readings from Last 3 Encounters:   05/15/17 112/78   03/24/17 112/72   03/28/16 122/86    Wt Readings from Last 3 Encounters:   05/15/17 245 lb 12.8 oz (111.5 kg)   03/24/17 240 lb (108.9 kg)   03/03/16 242 lb 3.2 oz (109.9 kg)                    Reviewed and updated as needed this visit by clinical staff  Allergies       Reviewed and updated as needed this visit by Provider         ROS:  C: NEGATIVE for fever, chills, change in weight  E/M: NEGATIVE for ear, mouth and throat problems  R: NEGATIVE for significant cough or SOB  CV: NEGATIVE for chest pain, palpitations or peripheral edema  : dysuria, urinary tract infection and pelvic pressure  ROS otherwise negative    OBJECTIVE:                                                    /78 (BP Location: Right arm, Patient Position: Chair)  Pulse 115  Temp 97.5  F (36.4  C) (Temporal)  Resp 18  Wt 245 lb 12.8 oz (111.5 kg)  SpO2 95%  BMI 42.86 kg/m2  Body mass index is 42.86 kg/(m^2).  GENERAL: healthy, alert, no distress and obese  NECK: no adenopathy, no asymmetry, masses, or scars and thyroid  normal to palpation  RESP: lungs clear to auscultation - no rales, rhonchi or wheezes  CV: regular rate and rhythm, normal S1 S2, no S3 or S4, no murmur, click or rub, no peripheral edema and peripheral pulses strong  ABDOMEN: soft, nontender, no hepatosplenomegaly, no masses and bowel sounds normal  MS: no gross musculoskeletal defects noted, no edema    Diagnostic Test Results:  Results for orders placed or performed in visit on 05/15/17 (from the past 24 hour(s))   *UA reflex to Microscopic and Culture ([facility] (except [place] and [place])   Result Value Ref Range    Color Urine Yellow     Appearance Urine Clear     Glucose Urine Negative NEG mg/dL    Bilirubin Urine Negative NEG    Ketones Urine Negative NEG mg/dL    Specific Gravity Urine <=1.005 1.003 - 1.035    Blood Urine Negative NEG    pH Urine 6.0 5.0 - 7.0 pH    Protein Albumin Urine Negative NEG mg/dL    Urobilinogen Urine 0.2 0.2 - 1.0 EU/dL    Nitrite Urine Negative NEG    Leukocyte Esterase Urine Moderate (A) NEG    Source Midstream Urine    Urine Microscopic   Result Value Ref Range    WBC Urine 5-10 (A) 0 - 2 /HPF    RBC Urine O - 2 0 - 2 /HPF    Bacteria Urine Few (A) NEG /HPF        ASSESSMENT/PLAN:                                                      1. Dysuria  Acute cystitis with LUTS. Encourage fluids. Start Cipro.  - *UA reflex to Microscopic and Culture ([facility] (except [place] and [place])  - Urine Microscopic  - ciprofloxacin (CIPRO) 250 MG tablet; Take 1 tablet (250 mg) by mouth 2 times daily  Dispense: 6 tablet; Refill: 0    2. Nonspecific finding on examination of urine  Nonspecific UA  - Urine Culture Aerobic Bacterial        Beck Martinez MD  [facility]

## 2017-05-17 LAB
BACTERIA SPEC CULT: NORMAL
MICRO REPORT STATUS: NORMAL
SPECIMEN SOURCE: NORMAL

## 2017-05-19 ENCOUNTER — TELEPHONE (OUTPATIENT)
Dept: FAMILY MEDICINE | Facility: OTHER | Age: 52
End: 2017-05-19

## 2017-05-19 NOTE — TELEPHONE ENCOUNTER
Reason for call:  Patient reporting a symptom    Symptom or request: pt finished 3 days of antibiotics. Still having UTI symptoms. Are you able to prescribe another antibiotic or should pt be seen? If so are you able to see pt today?     Duration (how long have symptoms been present):     Have you been treated for this before? Yes    Additional comments:     Phone Number patient can be reached at:      Best Time:      Can we leave a detailed message on this number:  YES    Call taken on 5/19/2017 at 10:46 AM by Melina Bazan

## 2017-05-19 NOTE — TELEPHONE ENCOUNTER
Called and spoke to the patient. She put her  on the phone to talk with RN. He said patient completed the 3 day course of medication. He said she still seems slightly confused and still having mild UTI symptoms. RN informed him of the message below. He said he will go get some Pyridium for her to try. RN asked about patient's confusion. He said she typically gets this confusion with UTI's. He said the confusion has not gotten worse but it has not fully cleared up.     RN advised patient and  to have patient seen next week with a provider if the confusion is not clearing up. Advised to try pyridium and increase fluids. Patient and  agree with the plan of care.     Will route to provider as FYI and to see if there is any further recommendations for the confusion.     Nieves Payne RN  [facility]

## 2017-05-19 NOTE — TELEPHONE ENCOUNTER
Please call patient to triage symptoms.  Her last UA was non specific. She doesn't need additional antibiotics. OK for pyridium.  Electronically signed by Beck Martinez MD

## 2017-06-26 ENCOUNTER — TELEPHONE (OUTPATIENT)
Dept: FAMILY MEDICINE | Facility: OTHER | Age: 52
End: 2017-06-26

## 2017-06-26 NOTE — LETTER
Sana Ward  [address]   [facility] [address]      June 27, 2017      To whom it may concern,    Sana Ward is currently under my general medical care. She is unsuitable for jury duty at this time due to chronic medical conditions and developmental delay..  Please excuse from jury duty until indefinite.    Sincerely,    Beck Martinez

## 2017-06-26 NOTE — TELEPHONE ENCOUNTER
Sana would also like the letter to include a statement that being on jury duty and being paid will create a conflict with her social security.

## 2017-06-26 NOTE — TELEPHONE ENCOUNTER
Reason for Call:  Form, our goal is to have forms completed with 72 hours, however, some forms may require a visit or additional information.    Type of letter, form or note:  Jury Summons Release     Who is the form from?: Patient received a Jury Summons  (if other please explain)    Where did the form come from: Patient or family brought in       What clinic location was the form placed at?: [place]    Where the form was placed: Needs written statement that she has physical and mental disability     What number is listed as a contact on the form?:         Additional comments: Needs a written statement to go with her jury summons form stating that she is physically and mentally unable to serve for jury duty. Informed out of office until 6/28.     Call taken on 6/26/2017 at 1:23 PM by Melinda Swann

## 2017-06-27 NOTE — TELEPHONE ENCOUNTER
Her having a learning disability should disqualify her from jury duty.    Thank you,  Jennifer BERGERON

## 2017-06-27 NOTE — TELEPHONE ENCOUNTER
Talked to Gilberto and let him know the letter is ready for , he stated they will be here shortly to pick it up.    Jennifer Latif

## 2017-06-30 DIAGNOSIS — Z93.2 STATUS POST ILEOSTOMY (H): ICD-10-CM

## 2017-06-30 RX ORDER — KARAYA GUM
POWDER (GRAM) TOPICAL
Qty: 50 EACH | Refills: 5 | Status: SHIPPED | OUTPATIENT
Start: 2017-06-30 | End: 2018-08-11

## 2017-06-30 NOTE — TELEPHONE ENCOUNTER
Routing refill request to provider for review/approval because:  Drug not on the [facility] refill protocol     Nieves Payne RN  [facility]

## 2017-06-30 NOTE — TELEPHONE ENCOUNTER
Ostomy Supplies (SKIN GEL PROTECT DRESSING WIPE) PADS      Last Written Prescription Date: 4/13/16  Last Fill Quantity: 50,  # refills: 3   Last Office Visit with [facility], [facility] or [facility] prescribing provider: 5/15/17                                         Next 5 appointments (look out 90 days)     Sep 25, 2017 11:20 AM CDT   Office Visit with Beck Martinez MD   [facility]    [address] [facility] [address] [phone]

## 2017-07-13 DIAGNOSIS — F20.9 SCHIZOPHRENIA (H): Primary | ICD-10-CM

## 2017-07-13 DIAGNOSIS — E78.5 HYPERLIPIDEMIA LDL GOAL <100: ICD-10-CM

## 2017-08-31 ENCOUNTER — OFFICE VISIT (OUTPATIENT)
Dept: FAMILY MEDICINE | Facility: OTHER | Age: 52
End: 2017-08-31
Payer: MEDICARE

## 2017-08-31 VITALS
OXYGEN SATURATION: 95 % | RESPIRATION RATE: 24 BRPM | TEMPERATURE: 97.8 F | WEIGHT: 237 LBS | HEART RATE: 129 BPM | DIASTOLIC BLOOD PRESSURE: 72 MMHG | SYSTOLIC BLOOD PRESSURE: 106 MMHG | BODY MASS INDEX: 41.32 KG/M2

## 2017-08-31 DIAGNOSIS — J06.9 VIRAL URI WITH COUGH: Primary | ICD-10-CM

## 2017-08-31 PROCEDURE — 99213 OFFICE O/P EST LOW 20 MIN: CPT | Performed by: NURSE PRACTITIONER

## 2017-08-31 NOTE — PROGRESS NOTES
SUBJECTIVE:   Sana Ward is a 52 year old female who presents to clinic today for the following health issues:      RESPIRATORY SYMPTOMS      Duration: 2 days    Description  sore throat and cough    Severity: severe    Accompanying signs and symptoms: None    History (predisposing factors):  none    Precipitating or alleviating factors: None    Therapies tried and outcome:  lozenges        Problem list and histories reviewed & adjusted, as indicated.  Additional history: none    Patient Active Problem List   Diagnosis     Hypertension goal BP (blood pressure) < 130/80     Hyperlipidemia LDL goal <100     Mental health disorder     Advanced directives, counseling/discussion     Generalized anxiety disorder     Type 2 diabetes mellitus without complication (H)     Hypothyroidism due to acquired atrophy of thyroid     Status post ileostomy (H)     Obesity, Class III, BMI 40-49.9 (morbid obesity) (H)     Methamphetamine addiction (H)     Acute exacerbation of chronic paranoid schizophrenia (H)     Past Surgical History:   Procedure Laterality Date     COLECTOMY  9/11/2001    total colectomy due to Crohn's     GI SURGERY  2003    has ostomy bag      HERNIA REPAIR      over 20 years ago       Social History   Substance Use Topics     Smoking status: Never Smoker     Smokeless tobacco: Never Used     Alcohol use No     Family History   Problem Relation Age of Onset     DIABETES Maternal Grandmother      DIABETES Maternal Grandfather          Current Outpatient Prescriptions   Medication Sig Dispense Refill     Ostomy Supplies (SKIN GEL PROTECT DRESSING WIPE) PADS USE EVERY DAY AS DIRECTED 50 each 5     ciprofloxacin (CIPRO) 250 MG tablet Take 1 tablet (250 mg) by mouth 2 times daily 6 tablet 0     Ostomy Supplies (ADAPT) PSTE APPLY TO AFFECTED AREA AS DIRECTED 60 each 3     levothyroxine (SYNTHROID/LEVOTHROID) 50 MCG tablet TAKE 1 TABLET BY MOUTH EVER Y DAY 90 tablet 3     sucralfate (CARAFATE) 1 GM/10ML suspension  "APPLY A SMALL AMOUNT TO PARA STOMA 1-2 TIMES DAILY 100 mL 6     metFORMIN (GLUCOPHAGE) 500 MG tablet Take 1 tablet (500 mg) by mouth 2 times daily (with meals) 180 tablet 3     losartan (COZAAR) 50 MG tablet TAKE 1 TABLET BY MOUTH EVER Y DAY 90 tablet 3     simvastatin (ZOCOR) 20 MG tablet TAKE 1 TABLET BY MOUTH RACHEL Y AT BEDTIME 90 tablet 3     order for DME Equipment being ordered: Ostomy Supplies( Premier Drainable Pouch 1.125\") 1 Box 11     Skin Protectants, Misc. (NO STING BARRIER FILM) MISC USE EVERY DAY AS DIRECTED 25 each 3     Ostomy Supplies (ADAPT BARRIER RING 1-3/16\") MISC USE EVERY DAY AS DIRECTED 10 each 3     order for DME Equipment being ordered: Premier Drainable Pouch Misc    Ostomy Supplies 1 Box 3     order for DME daily Premier Drain Pouch 1.125\" to be used every day as directed. 90 each 3     ORDER FOR DME Equipment being ordered: ostomy supplies  Premier Drainable Pouches MISC. 20 each 6     sertraline (ZOLOFT) 100 MG tablet Take 1 tablet (100 mg) by mouth daily 90 tablet 1     traZODone (DESYREL) 100 MG tablet Take 1 tablet (100 mg) by mouth At Bedtime 90 tablet 1     gabapentin (NEURONTIN) 100 MG capsule Take 100 mg capsule in Morning and 200 mg at bedtime. 180 capsule 1     haloperidol (HALDOL) 1 MG tablet Take 1 mg by mouth as needed As needed for hallucinations, voices       risperiDONE (RISPERDAL) 3 MG tablet Take 3.0 mg by mouth At Bedtime. 30 tablet 1     risperiDONE (RISPERDAL) 0.5 MG tablet Take 2 tablets (1 mg) by mouth 2 times daily Take 2 Tablets(1 mg) in the AM, and at  2 pm she needs to take 4 mg 30 tablet 1     LORazepam (ATIVAN) 0.25 MG TABS Take 0.25 mg by mouth At Bedtime.  20 tablet 0     ORDER FOR DME Sekou ostomy bags to be used as directed. 1 Box 6     ORDER FOR DME Yassine Cohesive seals to be used every day as directed. 1 Box 3     Allergies   Allergen Reactions     Amoxicillin      Contrast Dye      Sulfa Drugs Hives     BP Readings from Last 3 Encounters: "   08/31/17 106/72   05/15/17 112/78   03/24/17 112/72    Wt Readings from Last 3 Encounters:   08/31/17 237 lb (107.5 kg)   05/15/17 245 lb 12.8 oz (111.5 kg)   03/24/17 240 lb (108.9 kg)                        Reviewed and updated as needed this visit by clinical staffTobacco  Allergies  Meds  Soc Hx      Reviewed and updated as needed this visit by Provider         ROS:  CONSTITUTIONAL:NEGATIVE for fever, chills, change in weight  E/M: NEGATIVE for ear, mouth and throat problems  RESP:POSITIVE for cough-non productive and NEGATIVE for Hx asthma, Hx COPD, SOB/dyspnea and wheezing  CV: NEGATIVE for chest pain, palpitations or peripheral edema    OBJECTIVE:     /72  Pulse 129  Temp 97.8  F (36.6  C) (Tympanic)  Resp 24  Wt 237 lb (107.5 kg)  LMP  (Exact Date)  SpO2 95%  Breastfeeding? No  BMI 41.32 kg/m2  Body mass index is 41.32 kg/(m^2).  GENERAL: alert, no distress and obese  HENT: ear canals and TM's normal, nose and mouth without ulcers or lesions  NECK: no adenopathy, no asymmetry, masses, or scars and thyroid normal to palpation  RESP: lungs clear to auscultation - no rales, rhonchi or wheezes  CV: regular rate and rhythm, normal S1 S2, no S3 or S4, no murmur, click or rub, no peripheral edema and peripheral pulses strong  ABDOMEN: soft, nontender, no hepatosplenomegaly, no masses and bowel sounds normal  MS: no gross musculoskeletal defects noted, no edema    Diagnostic Test Results:  none     ASSESSMENT/PLAN:       1. Viral URI with cough  Advised on symptomatic treatments including Tylenol, Ibuprofen, increasing fluids and rest.  Advised on using OTC cough suppressant as needed.       FUTURE APPOINTMENTS:       - Follow up if symptoms fail to resolve as expected.   See Patient Instructions    SHEELA Morrison [facility]

## 2017-08-31 NOTE — PATIENT INSTRUCTIONS
Take an over the counter cough medication.  The pharmacist can recommend a good one.     This should be better in 1-2 weeks.     Drink lots of water and rest a lot.     Let us know if this is not getting better within a few weeks.       Viral Upper Respiratory Illness (Adult)  You have a viral upper respiratory illness (URI), which is another term for the common cold. This illness is contagious during the first few days. It is spread through the air by coughing and sneezing. It may also be spread by direct contact (touching the sick person and then touching your own eyes, nose, or mouth). Frequent handwashing will decrease risk of spread. Most viral illnesses go away within 7 to 10 days with rest and simple home remedies. Sometimes the illness may last for several weeks. Antibiotics will not kill a virus, and they are generally not prescribed for this condition.    Home care    If symptoms are severe, rest at home for the first 2 to 3 days. When you resume activity, don't let yourself get too tired.    Avoid being exposed to cigarette smoke (yours or others ).    You may use acetaminophen or ibuprofen to control pain and fever, unless another medicine was prescribed. (Note: If you have chronic liver or kidney disease, have ever had a stomach ulcer or gastrointestinal bleeding, or are taking blood-thinning medicines, talk with your healthcare provider before using these medicines.) Aspirin should never be given to anyone under 18 years of age who is ill with a viral infection or fever. It may cause severe liver or brain damage.    Your appetite may be poor, so a light diet is fine. Avoid dehydration by drinking 6 to 8 glasses of fluids per day (water, soft drinks, juices, tea, or soup). Extra fluids will help loosen secretions in the nose and lungs.    Over-the-counter cold medicines will not shorten the length of time you re sick, but they may be helpful for the following symptoms: cough, sore throat, and nasal and  sinus congestion. (Note: Do not use decongestants if you have high blood pressure.)  Follow-up care  Follow up with your healthcare provider, or as advised.  When to seek medical advice  Call your healthcare provider right away if any of these occur:    Cough with lots of colored sputum (mucus)    Severe headache; face, neck, or ear pain    Difficulty swallowing due to throat pain    Fever of 100.4 F (38 C)  Call 911, or get immediate medical care  Call emergency services right away if any of these occur:    Chest pain, shortness of breath, wheezing, or difficulty breathing    Coughing up blood    Inability to swallow due to throat pain  Date Last Reviewed: 9/13/2015 2000-2017 [employer]. [address] [place] [address], [place] [address]. All rights reserved. This information is not intended as a substitute for professional medical care. Always follow your healthcare professional's instructions.

## 2017-08-31 NOTE — NURSING NOTE
"Chief Complaint   Patient presents with     URI     cough, ST       Initial /72  Pulse 129  Temp 97.8  F (36.6  C) (Tympanic)  Resp 24  Wt 237 lb (107.5 kg)  LMP  (Exact Date)  SpO2 95%  Breastfeeding? No  BMI 41.32 kg/m2 Estimated body mass index is 41.32 kg/(m^2) as calculated from the following:    Height as of 3/24/17: 5' 3.5\" (1.613 m).    Weight as of this encounter: 237 lb (107.5 kg).  Medication Reconciliation: complete   ................Mendez Reeder LPN,   August 31, 2017,      9:45 AM,   [facility]    "

## 2017-08-31 NOTE — MR AVS SNAPSHOT
After Visit Summary   8/31/2017    Sana Ward    MRN: [id number]           Patient Information     Date Of Birth          1965        Visit Information        Provider Department      8/31/2017 9:40 AM Sangita Wade APRN [facility]        Care Instructions    Take an over the counter cough medication.  The pharmacist can recommend a good one.     This should be better in 1-2 weeks.     Drink lots of water and rest a lot.     Let us know if this is not getting better within a few weeks.       Viral Upper Respiratory Illness (Adult)  You have a viral upper respiratory illness (URI), which is another term for the common cold. This illness is contagious during the first few days. It is spread through the air by coughing and sneezing. It may also be spread by direct contact (touching the sick person and then touching your own eyes, nose, or mouth). Frequent handwashing will decrease risk of spread. Most viral illnesses go away within 7 to 10 days with rest and simple home remedies. Sometimes the illness may last for several weeks. Antibiotics will not kill a virus, and they are generally not prescribed for this condition.    Home care    If symptoms are severe, rest at home for the first 2 to 3 days. When you resume activity, don't let yourself get too tired.    Avoid being exposed to cigarette smoke (yours or others ).    You may use acetaminophen or ibuprofen to control pain and fever, unless another medicine was prescribed. (Note: If you have chronic liver or kidney disease, have ever had a stomach ulcer or gastrointestinal bleeding, or are taking blood-thinning medicines, talk with your healthcare provider before using these medicines.) Aspirin should never be given to anyone under 18 years of age who is ill with a viral infection or fever. It may cause severe liver or brain damage.    Your appetite may be poor, so a light diet is fine. Avoid dehydration by drinking 6 to 8  glasses of fluids per day (water, soft drinks, juices, tea, or soup). Extra fluids will help loosen secretions in the nose and lungs.    Over-the-counter cold medicines will not shorten the length of time you re sick, but they may be helpful for the following symptoms: cough, sore throat, and nasal and sinus congestion. (Note: Do not use decongestants if you have high blood pressure.)  Follow-up care  Follow up with your healthcare provider, or as advised.  When to seek medical advice  Call your healthcare provider right away if any of these occur:    Cough with lots of colored sputum (mucus)    Severe headache; face, neck, or ear pain    Difficulty swallowing due to throat pain    Fever of 100.4 F (38 C)  Call 911, or get immediate medical care  Call emergency services right away if any of these occur:    Chest pain, shortness of breath, wheezing, or difficulty breathing    Coughing up blood    Inability to swallow due to throat pain  Date Last Reviewed: 9/13/2015 2000-2017 [employer]. [address] [place] [address]. All rights reserved. This information is not intended as a substitute for professional medical care. Always follow your healthcare professional's instructions.                Follow-ups after your visit        Your next 10 appointments already scheduled     Sep 25, 2017 11:15 AM CDT   LAB with NL LAB [facility] ([facility])    [address] [facility] [id number] [phone]           Patient must bring picture ID. Patient should be prepared to give a urine specimen  Please do not eat 10-12 hours before your appointment if you are coming in fasting for labs on lipids, cholesterol, or glucose (sugar). Pregnant women should follow their Care Team instructions. Water with medications is okay. Do not drink coffee or other fluids. If you have concerns about taking  your medications, please ask at office or if scheduling via [employer], send a  "message by clicking on Secure Messaging, Message Your Care Team.            Sep 25, 2017 11:20 AM CDT   Office Visit with Beck Martinez MD   [facility]    [address] [facility] [id number] [phone]           Bring a current list of meds and any records pertaining to this visit. For Physicals, please bring immunization records and any forms needing to be filled out. Please arrive 10 minutes early to complete paperwork.              Who to contact     If you have questions or need follow up information about today's clinic visit or your schedule please contact [facility] directly at [phone].  Normal or non-critical lab and imaging results will be communicated to you by MyChart, letter or phone within 4 business days after the clinic has received the results. If you do not hear from us within 7 days, please contact the clinic through [facility] or phone. If you have a critical or abnormal lab result, we will notify you by phone as soon as possible.  Submit refill requests through [employer] or call your pharmacy and they will forward the refill request to us. Please allow 3 business days for your refill to be completed.          Additional Information About Your Visit        MyChart Information     [facility] lets you send messages to your doctor, view your test results, renew your prescriptions, schedule appointments and more. To sign up, go to [email or web address] . Click on \"Log in\" on the left side of the screen, which will take you to the Welcome page. Then click on \"Sign up Now\" on the right side of the page.     You will be asked to enter the access code listed below, as well as some personal information. Please follow the directions to create your username and password.     Your access code is: [email or web address]  Expires: 2017 10:03 AM     Your access code will  in 90 days. If you need help or a new code, please call your [facility] or " [phone].        Care EveryWhere ID     This is your Care EveryWhere ID. This could be used by other organizations to access your [place] medical records  [id number]        Your Vitals Were     Pulse Temperature Respirations Last Period Pulse Oximetry Breastfeeding?    129 97.8  F (36.6  C) (Tympanic) 24 (Exact Date) 95% No    BMI (Body Mass Index)                   41.32 kg/m2            Blood Pressure from Last 3 Encounters:   08/31/17 106/72   05/15/17 112/78   03/24/17 112/72    Weight from Last 3 Encounters:   08/31/17 237 lb (107.5 kg)   05/15/17 245 lb 12.8 oz (111.5 kg)   03/24/17 240 lb (108.9 kg)              Today, you had the following     No orders found for display       Primary Care Provider Office Phone # Fax #    Beck Martinez [phone]       [address] [facility] [address]        Equal Access to Services     PRABHJOT FLORES : Hadii aad ku hadasho Sogeremias, waaxda luqadaha, qaybta kaalmada adeegyada, cecilia quiroz . So [facility] [phone].    ATENCIÓN: Si habla español, tiene a la disposición servicios gratuitos de asistencia lingüística. Llame al [phone].    We comply with applicable federal civil rights laws and [place] laws. We do not discriminate on the basis of race, color, national origin, age, disability sex, sexual orientation or gender identity.            Thank you!     Thank you for choosing [facility]  for your care. Our goal is always to provide you with excellent care. Hearing back from our patients is one way we can continue to improve our services. Please take a few minutes to complete the written survey that you may receive in the mail after your visit with us. Thank you!             Your Updated Medication List - Protect others around you: Learn how to safely use, store and throw away your medicines at www.disposemymeds.org.          This list is accurate as of: 8/31/17 10:03 AM.  Always use your most recent med  "list.                   Brand Name Dispense Instructions for use Diagnosis    * ADAPT BARRIER RING 1-3/16\" Misc     10 each    USE EVERY DAY AS DIRECTED    Status post ileostomy (H)       * ADAPT Pste     60 each    APPLY TO AFFECTED AREA AS DIRECTED    Status post ileostomy (H)       * SKIN GEL PROTECT DRESSING WIPE Pads     50 each    USE EVERY DAY AS DIRECTED    Status post ileostomy (H)       ciprofloxacin 250 MG tablet    CIPRO    6 tablet    Take 1 tablet (250 mg) by mouth 2 times daily    Dysuria       gabapentin 100 MG capsule    NEURONTIN    180 capsule    Take 100 mg capsule in Morning and 200 mg at bedtime.    Type 2 diabetes, HbA1C goal < 8% (H), Mental disorder, nonpsychotic       haloperidol 1 MG tablet    HALDOL     Take 1 mg by mouth as needed As needed for hallucinations, voices        levothyroxine 50 MCG tablet    SYNTHROID/LEVOTHROID    90 tablet    TAKE 1 TABLET BY MOUTH EVER Y DAY    Hypothyroidism due to acquired atrophy of thyroid       LORazepam 0.25 mg Tabs half-tab    ATIVAN    20 tablet    Take 0.25 mg by mouth At Bedtime.    Anxiety       losartan 50 MG tablet    COZAAR    90 tablet    TAKE 1 TABLET BY MOUTH EVER Y DAY    Hypertension goal BP (blood pressure) < 130/80       metFORMIN 500 MG tablet    GLUCOPHAGE    180 tablet    Take 1 tablet (500 mg) by mouth 2 times daily (with meals)    Status post ileostomy (H)       NO STING BARRIER FILM Misc     25 each    USE EVERY DAY AS DIRECTED    Status post ileostomy (H)       * order for DME     1 Box    Yassine Cohesive seals to be used every day as directed.    Colostomy status (H)       * order for DME     1 Box    Sekou ostomy bags to be used as directed.    Colostomy status (H)       * order for DME     20 each    Equipment being ordered: ostomy supplies Premier Drainable Pouches MISC.    Status post ileostomy (H)       * order for DME     90 each    daily Premier Drain Pouch 1.125\" to be used every day as directed.    Colostomy status " "(H)       * order for DME     1 Box    Equipment being ordered: Premier Drainable Pouch Misc  Ostomy Supplies    Status post ileostomy (H), Hyperlipidemia LDL goal <100, Ileostomy status (H)       * order for DME     1 Box    Equipment being ordered: Ostomy Supplies( Premier Drainable Pouch 1.125\")    Status post ileostomy (H)       * risperiDONE 3 MG tablet    risperDAL    30 tablet    Take 3.0 mg by mouth At Bedtime.    Anxiety, Mental disorder, nonpsychotic       * risperiDONE 0.5 MG tablet    risperDAL    30 tablet    Take 2 tablets (1 mg) by mouth 2 times daily Take 2 Tablets(1 mg) in the AM, and at  2 pm she needs to take 4 mg    Anxiety, Mental disorder, nonpsychotic       sertraline 100 MG tablet    ZOLOFT    90 tablet    Take 1 tablet (100 mg) by mouth daily    Mental disorder, nonpsychotic       simvastatin 20 MG tablet    ZOCOR    90 tablet    TAKE 1 TABLET BY MOUTH RACHEL Y AT BEDTIME    Hyperlipidemia LDL goal <100, Status post ileostomy (H), Ileostomy status (H)       sucralfate 1 GM/10ML suspension    CARAFATE    100 mL    APPLY A SMALL AMOUNT TO PARA STOMA 1-2 TIMES DAILY    Status post ileostomy (H)       traZODone 100 MG tablet    DESYREL    90 tablet    Take 1 tablet (100 mg) by mouth At Bedtime    Sleep disorder       * Notice:  This list has 11 medication(s) that are the same as other medications prescribed for you. Read the directions carefully, and ask your doctor or other care provider to review them with you.      "

## 2017-09-14 DIAGNOSIS — Z93.2 STATUS POST ILEOSTOMY (H): ICD-10-CM

## 2017-09-15 RX ORDER — OSTOMY SUPPLY
PASTE (GRAM) MISCELLANEOUS
Qty: 14 EACH | Refills: 1 | Status: SHIPPED | OUTPATIENT
Start: 2017-09-15 | End: 2018-02-02

## 2017-09-15 NOTE — TELEPHONE ENCOUNTER
Ostomy Supplies (ADAPT) PSTE8/31/17      Last Written Prescription Date:  4/28/17  Last Fill Quantity: 60,   # refills: 3  Last Office Visit with [facility], [facility] or [facility] prescribing provider: 8/31/17  Future Office visit:    Next 5 appointments (look out 90 days)     Sep 25, 2017 11:20 AM CDT   [facility]    [address] [facility] [id number] [phone]                   Routing refill request to provider for review/approval because:  Drug not on the [facility], [facility] or [facility] refill protocol or controlled substance

## 2017-09-25 ENCOUNTER — OFFICE VISIT (OUTPATIENT)
Dept: FAMILY MEDICINE | Facility: OTHER | Age: 52
End: 2017-09-25
Payer: MEDICARE

## 2017-09-25 VITALS
DIASTOLIC BLOOD PRESSURE: 70 MMHG | WEIGHT: 236.8 LBS | BODY MASS INDEX: 41.29 KG/M2 | SYSTOLIC BLOOD PRESSURE: 104 MMHG | OXYGEN SATURATION: 95 % | RESPIRATION RATE: 18 BRPM | TEMPERATURE: 97 F | HEART RATE: 112 BPM

## 2017-09-25 DIAGNOSIS — E78.5 HYPERLIPIDEMIA LDL GOAL <100: ICD-10-CM

## 2017-09-25 DIAGNOSIS — F20.9 SCHIZOPHRENIA (H): ICD-10-CM

## 2017-09-25 DIAGNOSIS — Z00.00 MEDICARE ANNUAL WELLNESS VISIT, SUBSEQUENT: Primary | ICD-10-CM

## 2017-09-25 DIAGNOSIS — Z12.31 ENCOUNTER FOR SCREENING MAMMOGRAM FOR BREAST CANCER: ICD-10-CM

## 2017-09-25 DIAGNOSIS — Z12.4 ENCOUNTER FOR SCREENING FOR MALIGNANT NEOPLASM OF CERVIX: ICD-10-CM

## 2017-09-25 LAB
ALBUMIN SERPL-MCNC: 3.2 G/DL (ref 3.4–5)
ALP SERPL-CCNC: 87 U/L (ref 40–150)
ALT SERPL W P-5'-P-CCNC: 15 U/L (ref 0–50)
ANION GAP SERPL CALCULATED.3IONS-SCNC: 13 MMOL/L (ref 3–14)
AST SERPL W P-5'-P-CCNC: 11 U/L (ref 0–45)
BASOPHILS # BLD AUTO: 0 10E9/L (ref 0–0.2)
BASOPHILS NFR BLD AUTO: 0.1 %
BILIRUB SERPL-MCNC: 0.4 MG/DL (ref 0.2–1.3)
BUN SERPL-MCNC: 19 MG/DL (ref 7–30)
CALCIUM SERPL-MCNC: 8.4 MG/DL (ref 8.5–10.1)
CHLORIDE SERPL-SCNC: 102 MMOL/L (ref 94–109)
CHOLEST SERPL-MCNC: 105 MG/DL
CO2 SERPL-SCNC: 24 MMOL/L (ref 20–32)
CREAT SERPL-MCNC: 0.71 MG/DL (ref 0.52–1.04)
DIFFERENTIAL METHOD BLD: ABNORMAL
EOSINOPHIL # BLD AUTO: 0.1 10E9/L (ref 0–0.7)
EOSINOPHIL NFR BLD AUTO: 1.4 %
ERYTHROCYTE [DISTWIDTH] IN BLOOD BY AUTOMATED COUNT: 14.9 % (ref 10–15)
GFR SERPL CREATININE-BSD FRML MDRD: 86 ML/MIN/1.7M2
GLUCOSE SERPL-MCNC: 98 MG/DL (ref 70–99)
HBA1C MFR BLD: 5.9 % (ref 4.3–6)
HCT VFR BLD AUTO: 38 % (ref 35–47)
HDLC SERPL-MCNC: 54 MG/DL
HGB BLD-MCNC: 12.6 G/DL (ref 11.7–15.7)
LDLC SERPL CALC-MCNC: 37 MG/DL
LYMPHOCYTES # BLD AUTO: 0.6 10E9/L (ref 0.8–5.3)
LYMPHOCYTES NFR BLD AUTO: 7 %
MCH RBC QN AUTO: 29 PG (ref 26.5–33)
MCHC RBC AUTO-ENTMCNC: 33.2 G/DL (ref 31.5–36.5)
MCV RBC AUTO: 88 FL (ref 78–100)
MONOCYTES # BLD AUTO: 0.7 10E9/L (ref 0–1.3)
MONOCYTES NFR BLD AUTO: 7.9 %
NEUTROPHILS # BLD AUTO: 7.6 10E9/L (ref 1.6–8.3)
NEUTROPHILS NFR BLD AUTO: 83.6 %
NONHDLC SERPL-MCNC: 51 MG/DL
PLATELET # BLD AUTO: 234 10E9/L (ref 150–450)
POTASSIUM SERPL-SCNC: 4.1 MMOL/L (ref 3.4–5.3)
PROT SERPL-MCNC: 7.5 G/DL (ref 6.8–8.8)
RBC # BLD AUTO: 4.34 10E12/L (ref 3.8–5.2)
SODIUM SERPL-SCNC: 139 MMOL/L (ref 133–144)
TRIGL SERPL-MCNC: 71 MG/DL
TSH SERPL DL<=0.005 MIU/L-ACNC: 1.99 MU/L (ref 0.4–4)
WBC # BLD AUTO: 9.1 10E9/L (ref 4–11)

## 2017-09-25 PROCEDURE — 80053 COMPREHEN METABOLIC PANEL: CPT | Performed by: REGISTERED NURSE

## 2017-09-25 PROCEDURE — 83036 HEMOGLOBIN GLYCOSYLATED A1C: CPT | Performed by: FAMILY MEDICINE

## 2017-09-25 PROCEDURE — G0145 SCR C/V CYTO,THINLAYER,RESCR: HCPCS | Performed by: FAMILY MEDICINE

## 2017-09-25 PROCEDURE — 84443 ASSAY THYROID STIM HORMONE: CPT | Performed by: REGISTERED NURSE

## 2017-09-25 PROCEDURE — 80061 LIPID PANEL: CPT | Performed by: REGISTERED NURSE

## 2017-09-25 PROCEDURE — 99396 PREV VISIT EST AGE 40-64: CPT | Performed by: FAMILY MEDICINE

## 2017-09-25 PROCEDURE — G0476 HPV COMBO ASSAY CA SCREEN: HCPCS | Performed by: FAMILY MEDICINE

## 2017-09-25 PROCEDURE — 36415 COLL VENOUS BLD VENIPUNCTURE: CPT | Performed by: FAMILY MEDICINE

## 2017-09-25 PROCEDURE — 80050 GENERAL HEALTH PANEL: CPT | Performed by: REGISTERED NURSE

## 2017-09-25 PROCEDURE — 87624 HPV HI-RISK TYP POOLED RSLT: CPT | Performed by: FAMILY MEDICINE

## 2017-09-25 ASSESSMENT — PAIN SCALES - GENERAL: PAINLEVEL: NO PAIN (0)

## 2017-09-25 ASSESSMENT — PATIENT HEALTH QUESTIONNAIRE - PHQ9: SUM OF ALL RESPONSES TO PHQ QUESTIONS 1-9: 3
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
## 2017-09-25 NOTE — MR AVS SNAPSHOT
After Visit Summary   9/25/2017    Sana Ward    MRN: [id number]           Patient Information     Date Of Birth          1965        Visit Information        Provider Department      9/25/2017 11:20 AM Beck Martinez MD [facility]        Today's Diagnoses     Medicare annual wellness visit, subsequent    -  1    Encounter for screening for malignant neoplasm of cervix         Encounter for screening mammogram for breast cancer          Care Instructions      Preventive Health Recommendations  Female Ages 50 - 64    Yearly exam: See your health care provider every year in order to  o Review health changes.   o Discuss preventive care.    o Review your medicines if your doctor has prescribed any.      Get a Pap test every three years (unless you have an abnormal result and your provider advises testing more often).    If you get Pap tests with HPV test, you only need to test every 5 years, unless you have an abnormal result.     You do not need a Pap test if your uterus was removed (hysterectomy) and you have not had cancer.    You should be tested each year for STDs (sexually transmitted diseases) if you're at risk.     Have a mammogram every 1 to 2 years.    Have a colonoscopy at age 50, or have a yearly FIT test (stool test). These exams screen for colon cancer.      Have a cholesterol test every 5 years, or more often if advised.    Have a diabetes test (fasting glucose) every three years. If you are at risk for diabetes, you should have this test more often.     If you are at risk for osteoporosis (brittle bone disease), think about having a bone density scan (DEXA).    Shots: Get a flu shot each year. Get a tetanus shot every 10 years.    Nutrition:     Eat at least 5 servings of fruits and vegetables each day.    Eat whole-grain bread, whole-wheat pasta and brown rice instead of white grains and rice.    Talk to your provider about Calcium and Vitamin D.  "    Lifestyle    Exercise at least 150 minutes a week (30 minutes a day, 5 days a week). This will help you control your weight and prevent disease.    Limit alcohol to one drink per day.    No smoking.     Wear sunscreen to prevent skin cancer.     See your dentist every six months for an exam and cleaning.    See your eye doctor every 1 to 2 years.            Follow-ups after your visit        Follow-up notes from your care team     Return in about 6 months (around 3/25/2018) for Routine Visit for diabetes.      Future tests that were ordered for you today     Open Future Orders        Priority Expected Expires Ordered    *MA Screening Digital Bilateral Routine  9/25/2018 9/25/2017            Who to contact     If you have questions or need follow up information about today's clinic visit or your schedule please contact [facility] directly at [phone].  Normal or non-critical lab and imaging results will be communicated to you by MobileMDhart, letter or phone within 4 business days after the clinic has received the results. If you do not hear from us within 7 days, please contact the clinic through MobileMDhart or phone. If you have a critical or abnormal lab result, we will notify you by phone as soon as possible.  Submit refill requests through Classiphix or call your pharmacy and they will forward the refill request to us. Please allow 3 business days for your refill to be completed.          Additional Information About Your Visit        Classiphix Information     Classiphix lets you send messages to your doctor, view your test results, renew your prescriptions, schedule appointments and more. To sign up, go to [email or web address] . Click on \"Log in\" on the left side of the screen, which will take you to the Welcome page. Then click on \"Sign up Now\" on the right side of the page.     You will be asked to enter the access code listed below, as well as some personal information. Please follow the directions " to create your username and password.     Your access code is: [email or web address]  Expires: 2017 10:03 AM     Your access code will  in 90 days. If you need help or a new code, please call your [facility] or [phone].        Care EveryWhere ID     This is your Care EveryWhere ID. This could be used by other organizations to access your [facility] medical records  [id number]        Your Vitals Were     Pulse Temperature Respirations Last Period Pulse Oximetry BMI (Body Mass Index)    112 97  F (36.1  C) (Temporal) 18 (Exact Date) 95% 41.29 kg/m2       Blood Pressure from Last 3 Encounters:   17 104/70   17 106/72   05/15/17 112/78    Weight from Last 3 Encounters:   17 236 lb 12.8 oz (107.4 kg)   17 237 lb (107.5 kg)   05/15/17 245 lb 12.8 oz (111.5 kg)              We Performed the Following     HEMOGLOBIN A1C     HPV High Risk Types DNA Cervical     Pap imaged thin layer screen with HPV - recommended age 30 - 65        Primary Care Provider Office Phone # Fax #    Beck Martinez [phone]       [address] [facility] [address]        Equal Access to Services     PRABHJOT FLORES : Hadii cornelio ku hadasho Soomaali, waaxda luqadaha, qaybta kaalmada adeegyada, cecilia christiansen. So [facility] [phone].    ATENCIÓN: Si habla español, tiene a la disposición servicios gratuitos de asistencia lingüística. Llame al [phone].    We comply with applicable federal civil rights laws and [place] laws. We do not discriminate on the basis of race, color, national origin, age, disability sex, sexual orientation or gender identity.            Thank you!     Thank you for choosing [facility]  for your care. Our goal is always to provide you with excellent care. Hearing back from our patients is one way we can continue to improve our services. Please take a few minutes to complete the written survey that you may receive in the mail after your  "visit with us. Thank you!             Your Updated Medication List - Protect others around you: Learn how to safely use, store and throw away your medicines at www.disposemymeds.org.          This list is accurate as of: 9/25/17 12:30 PM.  Always use your most recent med list.                   Brand Name Dispense Instructions for use Diagnosis    * ADAPT BARRIER RING 1-3/16\" Misc     10 each    USE EVERY DAY AS DIRECTED    Status post ileostomy (H)       * SKIN GEL PROTECT DRESSING WIPE Pads     50 each    USE EVERY DAY AS DIRECTED    Status post ileostomy (H)       * ADAPT Pste     14 each    APPLY TO AFFECTED AREA AS DIRECTED    Status post ileostomy (H)       gabapentin 100 MG capsule    NEURONTIN    180 capsule    Take 100 mg capsule in Morning and 200 mg at bedtime.    Type 2 diabetes, HbA1C goal < 8% (H), Mental disorder, nonpsychotic       haloperidol 1 MG tablet    HALDOL     Take 1 mg by mouth as needed As needed for hallucinations, voices        levothyroxine 50 MCG tablet    SYNTHROID/LEVOTHROID    90 tablet    TAKE 1 TABLET BY MOUTH EVER Y DAY    Hypothyroidism due to acquired atrophy of thyroid       LORazepam 0.25 mg Tabs half-tab    ATIVAN    20 tablet    Take 0.25 mg by mouth At Bedtime.    Anxiety       losartan 50 MG tablet    COZAAR    90 tablet    TAKE 1 TABLET BY MOUTH EVER Y DAY    Hypertension goal BP (blood pressure) < 130/80       metFORMIN 500 MG tablet    GLUCOPHAGE    180 tablet    Take 1 tablet (500 mg) by mouth 2 times daily (with meals)    Status post ileostomy (H)       NO STING BARRIER FILM Misc     25 each    USE EVERY DAY AS DIRECTED    Status post ileostomy (H)       * order for DME     1 Box    Yassine Cohesive seals to be used every day as directed.    Colostomy status (H)       * order for DME     1 Box    Sekou ostomy bags to be used as directed.    Colostomy status (H)       * order for DME     20 each    Equipment being ordered: ostomy supplies Premier Drainable Pouches " "MISC.    Status post ileostomy (H)       * order for DME     90 each    daily Premier Drain Pouch 1.125\" to be used every day as directed.    Colostomy status (H)       * order for DME     1 Box    Equipment being ordered: Premier Drainable Pouch Misc  Ostomy Supplies    Status post ileostomy (H), Hyperlipidemia LDL goal <100, Ileostomy status (H)       * order for DME     1 Box    Equipment being ordered: Ostomy Supplies( Premier Drainable Pouch 1.125\")    Status post ileostomy (H)       * risperiDONE 3 MG tablet    risperDAL    30 tablet    Take 3.0 mg by mouth At Bedtime.    Anxiety, Mental disorder, nonpsychotic       * risperiDONE 0.5 MG tablet    risperDAL    30 tablet    Take 2 tablets (1 mg) by mouth 2 times daily Take 2 Tablets(1 mg) in the AM, and at  2 pm she needs to take 4 mg    Anxiety, Mental disorder, nonpsychotic       sertraline 100 MG tablet    ZOLOFT    90 tablet    Take 1 tablet (100 mg) by mouth daily    Mental disorder, nonpsychotic       simvastatin 20 MG tablet    ZOCOR    90 tablet    TAKE 1 TABLET BY MOUTH RACHEL Y AT BEDTIME    Hyperlipidemia LDL goal <100, Status post ileostomy (H), Ileostomy status (H)       sucralfate 1 GM/10ML suspension    CARAFATE    100 mL    APPLY A SMALL AMOUNT TO PARA STOMA 1-2 TIMES DAILY    Status post ileostomy (H)       traZODone 100 MG tablet    DESYREL    90 tablet    Take 1 tablet (100 mg) by mouth At Bedtime    Sleep disorder       * Notice:  This list has 11 medication(s) that are the same as other medications prescribed for you. Read the directions carefully, and ask your doctor or other care provider to review them with you.      "

## 2017-09-25 NOTE — NURSING NOTE
"Chief Complaint   Patient presents with     Physical       Initial /70 (BP Location: Left arm, Patient Position: Chair, Cuff Size: Adult Large)  Pulse 112  Temp 97  F (36.1  C) (Temporal)  Resp 18  Wt 236 lb 12.8 oz (107.4 kg)  LMP  (Exact Date)  SpO2 95%  BMI 41.29 kg/m2 Estimated body mass index is 41.29 kg/(m^2) as calculated from the following:    Height as of 3/24/17: 5' 3.5\" (1.613 m).    Weight as of this encounter: 236 lb 12.8 oz (107.4 kg).  Medication Reconciliation: complete     Melanie Shook MA 9/25/2017  11:50 AM          "

## 2017-09-25 NOTE — LETTER
October 5, 2017    Sana Ward  [address]   [facility] [address]    Dear Sana,  We are happy to inform you that your PAP smear result from 09/25/17 is normal.  We are now able to do a follow up test on PAP smears. The DNA test is for HPV (Human Papilloma Virus). Cervical cancer is closely linked with certain types of HPV. Your result showed no evidence of high risk HPV.  Therefore we recommend you return in 5 years for your next pap smear and HPV test.  You will still need to return to the clinic every year for an annual exam and other preventive tests.  Please contact the clinic at [phone] with any questions.  Sincerely,    Beck Martinez MD/[facility]

## 2017-09-26 ENCOUNTER — RADIANT APPOINTMENT (OUTPATIENT)
Dept: MAMMOGRAPHY | Facility: OTHER | Age: 52
End: 2017-09-26
Attending: FAMILY MEDICINE
Payer: MEDICARE

## 2017-09-26 ENCOUNTER — TELEPHONE (OUTPATIENT)
Dept: FAMILY MEDICINE | Facility: OTHER | Age: 52
End: 2017-09-26

## 2017-09-26 DIAGNOSIS — Z12.31 ENCOUNTER FOR SCREENING MAMMOGRAM FOR BREAST CANCER: ICD-10-CM

## 2017-09-26 PROCEDURE — G0202 SCR MAMMO BI INCL CAD: HCPCS | Mod: TC

## 2017-09-26 NOTE — TELEPHONE ENCOUNTER
Called and spoke to the patient. She said she has some yeast under her breasts. She then asked if RN could talk her to  and put him on the phone.      said she gets this yeast rash about twice a year. He said she has only had it a few days and it already seems to be getting better.  said he has been cleaning the area often and keeping baby powder on the area. He said they also uses some clothes under each breast which he changes often. He said the redness comes on and then seems to get better. He said it is already looking better today.     Mammogram tech was worried they were using wet clothes. RN advised keeping the area dry and clean and not using anything damp under there. He said the clothes are dry when they use them.      said they have it down what they need to do to get it cleared up. He said he has not figured out how to prevent this but when it comes on it only seems to last about 3-4 days. He has no further questions. He said it seems to be clearing up nicely.     Will route to PCP as MEMO.     Nieves Payne RN  [facility]

## 2017-09-26 NOTE — TELEPHONE ENCOUNTER
Patient in clinic today for a mammogram. After she left, the mammogram tech came to us and said she seems to have a yeast rash under both breasts. She said the patient and her  seem to be using cool, wet rags on the area.     RN will call to triage.     Nieves Payne RN  [facility]

## 2017-09-27 LAB
COPATH REPORT: NORMAL
PAP: NORMAL

## 2017-09-28 LAB
FINAL DIAGNOSIS: NORMAL
HPV HR 12 DNA CVX QL NAA+PROBE: NEGATIVE
HPV16 DNA SPEC QL NAA+PROBE: NEGATIVE
HPV18 DNA SPEC QL NAA+PROBE: NEGATIVE
SPECIMEN DESCRIPTION: NORMAL

## 2018-02-02 DIAGNOSIS — Z93.2 STATUS POST ILEOSTOMY (H): ICD-10-CM

## 2018-02-02 RX ORDER — OSTOMY SUPPLY
PASTE (GRAM) MISCELLANEOUS
Qty: 60 EACH | Refills: 1 | Status: SHIPPED | OUTPATIENT
Start: 2018-02-02 | End: 2018-05-16

## 2018-02-02 NOTE — TELEPHONE ENCOUNTER
Ostomy supplies      Last Written Prescription Date:  9/15/17  Last Fill Quantity: 14,   # refills: 1  Last Office Visit: 9/25/17  Future Office visit:       Routing refill request to provider for review/approval because:  Drug not on the [facility], P or [facility] refill protocol or controlled substance

## 2018-02-02 NOTE — TELEPHONE ENCOUNTER
Requested Prescriptions   Pending Prescriptions Disp Refills     Ostomy Supplies (ADAPT) PSTE [Pharmacy Med Name: ADAPT PASTE]       Sig: APPLY TO AFFECTED AREA AS DIRECTED    There is no refill protocol information for this order        Last Written Prescription Date:  9/15/2017  Last Fill Quantity: 14,  # refills: 1   Last Office Visit with [facility] provider:  9/25/2017   Future Office Visit:

## 2018-03-08 DIAGNOSIS — Z93.2 STATUS POST ILEOSTOMY (H): ICD-10-CM

## 2018-03-08 NOTE — TELEPHONE ENCOUNTER
"Requested Prescriptions   Pending Prescriptions Disp Refills     CARAFATE 1 GM/10ML suspension [Pharmacy Med Name: CARAFATE 1G/10ML SUSP] 100 mL      Sig: APPLY A SMALL AMOUNT TO PARA STOMA 1-2 TIMES DAILY    Miscellaneous Gastrointestinal Agents Passed    3/8/2018  8:58 AM       Passed - Recent (12 mo) or future (30 days) visit within the authorizing provider's specialty    Patient had office visit in the last year or has a visit in the next 30 days with authorizing provider.  See \"Patient Info\" tab in inbasket, or \"Choose Columns\" in Meds & Orders section of the refill encounter.            Passed - Patient is 18 years of age or older          Last Written Prescription Date:  3/24/17  Last Fill Quantity: 100 mL,  # refills: 6   Last Office Visit with [facility], P or [facility] prescribing provider:  9-25-17   Future Office Visit:       "

## 2018-03-09 RX ORDER — SUCRALFATE 1 G/10ML
SUSPENSION ORAL
Qty: 100 ML | Refills: 0 | Status: SHIPPED | OUTPATIENT
Start: 2018-03-09 | End: 2018-04-30

## 2018-03-09 NOTE — TELEPHONE ENCOUNTER
Prescription approved per [facility] Refill Protocol.  Rachana Bassett RN

## 2018-03-23 DIAGNOSIS — E78.5 HYPERLIPIDEMIA LDL GOAL <100: ICD-10-CM

## 2018-03-23 DIAGNOSIS — Z93.2 STATUS POST ILEOSTOMY (H): ICD-10-CM

## 2018-03-23 DIAGNOSIS — Z93.2 ILEOSTOMY STATUS (H): ICD-10-CM

## 2018-03-23 RX ORDER — SIMVASTATIN 20 MG
TABLET ORAL
Qty: 90 TABLET | Refills: 0 | Status: SHIPPED | OUTPATIENT
Start: 2018-03-23 | End: 2018-06-28

## 2018-03-23 NOTE — TELEPHONE ENCOUNTER
"Requested Prescriptions   Pending Prescriptions Disp Refills     simvastatin (ZOCOR) 20 MG tablet [Pharmacy Med Name: SIMVASTATIN 20MG TAB] 90 tablet      Sig: TAKE 1 TABLET BY MOUTH DAILY AT BEDTIME    Statins Protocol Passed    3/23/2018  8:55 AM       Passed - LDL on file in past 12 months    Recent Labs   Lab Test  09/25/17   1123   LDL  37            Passed - No abnormal creatine kinase in past 12 months    No lab results found.            Passed - Recent (12 mo) or future (30 days) visit within the authorizing provider's specialty    Patient had office visit in the last 12 months or has a visit in the next 30 days with authorizing provider or within the authorizing provider's specialty.  See \"Patient Info\" tab in inbasket, or \"Choose Columns\" in Meds & Orders section of the refill encounter.           Passed - Patient is age 18 or older       Passed - No active pregnancy on record       Passed - No positive pregnancy test in past 12 months          Last Written Prescription Date:  3/24/17  Last Fill Quantity: 90,  # refills: 3   Last Office Visit with G, P or [facility] prescribing provider:  9-25-17   Future Office Visit:       "

## 2018-04-10 DIAGNOSIS — I10 HYPERTENSION GOAL BP (BLOOD PRESSURE) < 130/80: ICD-10-CM

## 2018-04-10 RX ORDER — LOSARTAN POTASSIUM 50 MG/1
TABLET ORAL
Qty: 90 TABLET | Refills: 0 | Status: SHIPPED | OUTPATIENT
Start: 2018-04-10 | End: 2018-07-19

## 2018-04-10 NOTE — TELEPHONE ENCOUNTER
"Last Written Prescription Date:  3/24/2017  Last Fill Quantity: 90,  # refills: 3   Last office visit: 9/25/2017 with prescribing provider:  Dr. Martinez   Future Office Visit:    Requested Prescriptions   Pending Prescriptions Disp Refills     losartan (COZAAR) 50 MG tablet [Pharmacy Med Name: LOSARTAN 50MG TABLET] 90 tablet      Sig: TAKE 1 TABLET BY MOUTH ONCE DAILY    Angiotensin-II Receptors Passed    4/10/2018  1:18 PM       Passed - Blood pressure under 140/90 in past 12 months    BP Readings from Last 3 Encounters:   09/25/17 104/70   08/31/17 106/72   05/15/17 112/78                Passed - Recent (12 mo) or future (30 days) visit within the authorizing provider's specialty    Patient had office visit in the last 12 months or has a visit in the next 30 days with authorizing provider or within the authorizing provider's specialty.  See \"Patient Info\" tab in inbasket, or \"Choose Columns\" in Meds & Orders section of the refill encounter.           Passed - Patient is age 18 or older       Passed - No active pregnancy on record       Passed - Normal serum creatinine on file in past 12 months    Recent Labs   Lab Test  09/25/17   1123   CR  0.71            Passed - Normal serum potassium on file in past 12 months    Recent Labs   Lab Test  09/25/17   1123   POTASSIUM  4.1                   Passed - No positive pregnancy test in past 12 months          "

## 2018-04-20 DIAGNOSIS — Z93.2 STATUS POST ILEOSTOMY (H): Primary | ICD-10-CM

## 2018-04-20 RX ORDER — COLOSTOMY BAGS 3/4"
EACH MISCELLANEOUS
Qty: 20 EACH | Refills: 11 | Status: SHIPPED | OUTPATIENT
Start: 2018-04-20 | End: 2019-06-17

## 2018-04-20 NOTE — TELEPHONE ENCOUNTER
[employer] Drain Pouch      Last Written Prescription Date:  2/16/2016  Last Fill Quantity: 90,   # refills: 3  Last Office Visit: 9/25/2017  Future Office visit:       Routing refill request to provider for review/approval because:  Drug not on the [facility], P or [facility] refill protocol or controlled substance

## 2018-04-30 DIAGNOSIS — Z93.2 STATUS POST ILEOSTOMY (H): ICD-10-CM

## 2018-04-30 NOTE — TELEPHONE ENCOUNTER
"Last Written Prescription Date:  3/09/2018  Last Fill Quantity: 100 mL,  # refills: 0   Last office visit: 9/25/2017 with prescribing provider:  Dr. Martinez   Future Office Visit:    Requested Prescriptions   Pending Prescriptions Disp Refills     CARAFATE 1 GM/10ML suspension [Pharmacy Med Name: CARAFATE 1G/10ML SUSP] 100 mL      Sig: APPLY A SMALL AMOUNT TO PARA STOMA 1-2 TIMES DAILY    Miscellaneous Gastrointestinal Agents Passed    4/30/2018 12:49 PM       Passed - Recent (12 mo) or future (30 days) visit within the authorizing provider's specialty    Patient had office visit in the last 12 months or has a visit in the next 30 days with authorizing provider or within the authorizing provider's specialty.  See \"Patient Info\" tab in inbasket, or \"Choose Columns\" in Meds & Orders section of the refill encounter.           Passed - Patient is 18 years of age or older          "

## 2018-05-01 RX ORDER — SUCRALFATE 1 G/10ML
SUSPENSION ORAL
Qty: 100 ML | Refills: 0 | Status: SHIPPED | OUTPATIENT
Start: 2018-05-01 | End: 2018-06-15

## 2018-05-01 NOTE — TELEPHONE ENCOUNTER
Prescription approved per [facility] Refill Protocol.    Nieves Payne RN  [facility]

## 2018-05-07 DIAGNOSIS — Z93.2 STATUS POST ILEOSTOMY (H): ICD-10-CM

## 2018-05-07 DIAGNOSIS — E03.4 HYPOTHYROIDISM DUE TO ACQUIRED ATROPHY OF THYROID: ICD-10-CM

## 2018-05-07 NOTE — TELEPHONE ENCOUNTER
"Requested Prescriptions   Pending Prescriptions Disp Refills     metFORMIN (GLUCOPHAGE) 500 MG tablet [Pharmacy Med Name: METFORMIN 500MG TAB] 180 tablet      Sig: TAKE 1 TABLET BY MOUTH TWICE A DAY WITH MEALS    Biguanide Agents Failed    5/7/2018  8:36 AM       Failed - Blood pressure less than 140/90 in past 6 months    BP Readings from Last 3 Encounters:   09/25/17 104/70   08/31/17 106/72   05/15/17 112/78                Failed - Patient has had a Microalbumin in the past 12 mos.    Recent Labs   Lab Test  03/24/17   1144   MICROL  17   UMALCR  12.73            Failed - Patient has documented A1c within the specified period of time.    Recent Labs   Lab Test  09/25/17   1123   A1C  5.9            Failed - Recent (6 mo) or future (30 days) visit within the authorizing provider's specialty    Patient had office visit in the last 6 months or has a visit in the next 30 days with authorizing provider or within the authorizing provider's specialty.  See \"Patient Info\" tab in inbasket, or \"Choose Columns\" in Meds & Orders section of the refill encounter.           Passed - Patient has documented LDL within the past 12 mos.    Recent Labs   Lab Test  09/25/17   1123   LDL  37            Passed - Patient is age 10 or older       Passed - Patient's CR is NOT>1.4 OR Patient's EGFR is NOT<45 within past 12 mos.    Recent Labs   Lab Test  09/25/17   1123   GFRESTIMATED  86   GFRESTBLACK  >90       Recent Labs   Lab Test  09/25/17   1123   CR  0.71            Passed - Patient does NOT have a diagnosis of CHF.       Passed - Patient is not pregnant       Passed - Patient has not had a positive pregnancy test within the past 12 mos.         levothyroxine (SYNTHROID/LEVOTHROID) 50 MCG tablet [Pharmacy Med Name: LEVOTHYROXINE 50MCG TAB] 90 tablet      Sig: TAKE 1 TABLET BY MOUTH EVERY DAY    Thyroid Protocol Passed    5/7/2018  8:36 AM       Passed - Patient is 12 years or older       Passed - Recent (12 mo) or future (30 days) " "visit within the authorizing provider's specialty    Patient had office visit in the last 12 months or has a visit in the next 30 days with authorizing provider or within the authorizing provider's specialty.  See \"Patient Info\" tab in inbasket, or \"Choose Columns\" in Meds & Orders section of the refill encounter.           Passed - Normal TSH on file in past 12 months    Recent Labs   Lab Test  09/25/17   1123   TSH  1.99             Passed - No active pregnancy on record    If patient is pregnant or has had a positive pregnancy test, please check TSH.         Passed - No positive pregnancy test in past 12 months    If patient is pregnant or has had a positive pregnancy test, please check TSH.            Last Written Prescription Date:  3/24/17  Last Fill Quantity: 180,  # refills: 3   Last Office Visit with [facility], [facility] or [facility] prescribing provider:  9-25-17   Future Office Visit:       Levothyroxine 50 MCG       Last Written Prescription Date:  3/26/17  Last Fill Quantity: 90,   # refills: 3  Last Office Visit: 9-25-17  Future Office visit:           "

## 2018-05-08 RX ORDER — LEVOTHYROXINE SODIUM 50 UG/1
TABLET ORAL
Qty: 90 TABLET | Refills: 1 | Status: SHIPPED | OUTPATIENT
Start: 2018-05-08 | End: 2018-11-07

## 2018-05-08 NOTE — TELEPHONE ENCOUNTER
Routing refill request to provider for review/approval because:  Labs not current:  BP, Microalbumin, A1C    Nieves Payne RN  [facility]

## 2018-05-16 DIAGNOSIS — Z93.2 STATUS POST ILEOSTOMY (H): ICD-10-CM

## 2018-05-16 RX ORDER — OSTOMY SUPPLY
PASTE (GRAM) MISCELLANEOUS
Qty: 60 EACH | Refills: 11 | Status: SHIPPED | OUTPATIENT
Start: 2018-05-16 | End: 2019-05-25

## 2018-06-15 DIAGNOSIS — Z93.2 STATUS POST ILEOSTOMY (H): ICD-10-CM

## 2018-06-15 NOTE — TELEPHONE ENCOUNTER
"Requested Prescriptions   Pending Prescriptions Disp Refills     CARAFATE 1 GM/10ML suspension [Pharmacy Med Name: CARAFATE 1G/10ML SUSP] 100 mL      Sig: APPLY A SMALL AMOUNT TO PARA STOMA 1-2 TIMES DAILY    Miscellaneous Gastrointestinal Agents Passed    6/15/2018  2:27 PM       Passed - Recent (12 mo) or future (30 days) visit within the authorizing provider's specialty    Patient had office visit in the last 12 months or has a visit in the next 30 days with authorizing provider or within the authorizing provider's specialty.  See \"Patient Info\" tab in inbasket, or \"Choose Columns\" in Meds & Orders section of the refill encounter.           Passed - Patient is 18 years of age or older        Last Written Prescription Date:  05/01/2018  Last Fill Quantity: 100ml,  # refills: 0   Last office visit: 9/25/2017 with prescribing provider:  09/25/2017   Future Office Visit:      "

## 2018-06-18 RX ORDER — SUCRALFATE 1 G/10ML
SUSPENSION ORAL
Qty: 100 ML | Refills: 1 | Status: SHIPPED | OUTPATIENT
Start: 2018-06-18 | End: 2018-08-23

## 2018-06-18 NOTE — TELEPHONE ENCOUNTER
Routing refill request to provider for review/approval because:  Patient needs to be seen because:  Due for 6 month follow up    Nieves Payne RN  [facility]

## 2018-06-28 DIAGNOSIS — Z93.2 STATUS POST ILEOSTOMY (H): ICD-10-CM

## 2018-06-28 DIAGNOSIS — Z93.2 ILEOSTOMY STATUS (H): ICD-10-CM

## 2018-06-28 DIAGNOSIS — E78.5 HYPERLIPIDEMIA LDL GOAL <100: ICD-10-CM

## 2018-06-28 NOTE — TELEPHONE ENCOUNTER
"Requested Prescriptions   Pending Prescriptions Disp Refills     simvastatin (ZOCOR) 20 MG tablet [Pharmacy Med Name: SIMVASTATIN 20MG TAB] 90 tablet      Sig: TAKE 1 TABLET BY MOUTH DAILY AT BEDTIME    Statins Protocol Passed    6/28/2018  1:41 PM       Passed - LDL on file in past 12 months    Recent Labs   Lab Test  09/25/17   1123   LDL  37            Passed - No abnormal creatine kinase in past 12 months    No lab results found.            Passed - Recent (12 mo) or future (30 days) visit within the authorizing provider's specialty    Patient had office visit in the last 12 months or has a visit in the next 30 days with authorizing provider or within the authorizing provider's specialty.  See \"Patient Info\" tab in inbasket, or \"Choose Columns\" in Meds & Orders section of the refill encounter.           Passed - Patient is age 18 or older       Passed - No active pregnancy on record       Passed - No positive pregnancy test in past 12 months          Last Written Prescription Date:  3/23/18  Last Fill Quantity: 90,  # refills: 0   Last Office Visit with G, P or [facility] prescribing provider:  9-25-17   Future Office Visit:       "

## 2018-06-29 RX ORDER — SIMVASTATIN 20 MG
TABLET ORAL
Qty: 90 TABLET | Refills: 0 | Status: SHIPPED | OUTPATIENT
Start: 2018-06-29 | End: 2018-09-21

## 2018-06-29 NOTE — TELEPHONE ENCOUNTER
Prescription approved per [facility] [place].  Due for labs/recheck in September.     Minerva Hobbs RN. . .  6/29/2018, 10:11 AM

## 2018-07-12 DIAGNOSIS — E78.5 HYPERLIPIDEMIA LDL GOAL <100: ICD-10-CM

## 2018-07-12 DIAGNOSIS — F20.9 SCHIZOPHRENIA (H): Primary | ICD-10-CM

## 2018-07-12 DIAGNOSIS — E11.9 TYPE 2 DIABETES MELLITUS WITHOUT COMPLICATION, WITHOUT LONG-TERM CURRENT USE OF INSULIN (H): ICD-10-CM

## 2018-07-12 LAB
ALBUMIN SERPL-MCNC: 3.2 G/DL (ref 3.4–5)
ALP SERPL-CCNC: 91 U/L (ref 40–150)
ALT SERPL W P-5'-P-CCNC: 18 U/L (ref 0–50)
ANION GAP SERPL CALCULATED.3IONS-SCNC: 8 MMOL/L (ref 3–14)
AST SERPL W P-5'-P-CCNC: 13 U/L (ref 0–45)
BASOPHILS # BLD AUTO: 0 10E9/L (ref 0–0.2)
BASOPHILS NFR BLD AUTO: 0.2 %
BILIRUB SERPL-MCNC: 0.3 MG/DL (ref 0.2–1.3)
BUN SERPL-MCNC: 20 MG/DL (ref 7–30)
CALCIUM SERPL-MCNC: 8.3 MG/DL (ref 8.5–10.1)
CHLORIDE SERPL-SCNC: 106 MMOL/L (ref 94–109)
CHOLEST SERPL-MCNC: 125 MG/DL
CO2 SERPL-SCNC: 27 MMOL/L (ref 20–32)
CREAT SERPL-MCNC: 0.87 MG/DL (ref 0.52–1.04)
DIFFERENTIAL METHOD BLD: ABNORMAL
EOSINOPHIL # BLD AUTO: 0.2 10E9/L (ref 0–0.7)
EOSINOPHIL NFR BLD AUTO: 3.3 %
ERYTHROCYTE [DISTWIDTH] IN BLOOD BY AUTOMATED COUNT: 14.5 % (ref 10–15)
GFR SERPL CREATININE-BSD FRML MDRD: 68 ML/MIN/1.7M2
GLUCOSE SERPL-MCNC: 105 MG/DL (ref 70–99)
HCT VFR BLD AUTO: 38.2 % (ref 35–47)
HDLC SERPL-MCNC: 44 MG/DL
HGB BLD-MCNC: 12.5 G/DL (ref 11.7–15.7)
LDLC SERPL CALC-MCNC: 65 MG/DL
LYMPHOCYTES # BLD AUTO: 0.7 10E9/L (ref 0.8–5.3)
LYMPHOCYTES NFR BLD AUTO: 10.6 %
MCH RBC QN AUTO: 29.3 PG (ref 26.5–33)
MCHC RBC AUTO-ENTMCNC: 32.7 G/DL (ref 31.5–36.5)
MCV RBC AUTO: 90 FL (ref 78–100)
MONOCYTES # BLD AUTO: 0.5 10E9/L (ref 0–1.3)
MONOCYTES NFR BLD AUTO: 7.1 %
NEUTROPHILS # BLD AUTO: 5.2 10E9/L (ref 1.6–8.3)
NEUTROPHILS NFR BLD AUTO: 78.8 %
NONHDLC SERPL-MCNC: 81 MG/DL
PLATELET # BLD AUTO: 256 10E9/L (ref 150–450)
POTASSIUM SERPL-SCNC: 4 MMOL/L (ref 3.4–5.3)
PROT SERPL-MCNC: 7.2 G/DL (ref 6.8–8.8)
RBC # BLD AUTO: 4.27 10E12/L (ref 3.8–5.2)
SODIUM SERPL-SCNC: 141 MMOL/L (ref 133–144)
TRIGL SERPL-MCNC: 78 MG/DL
WBC # BLD AUTO: 6.6 10E9/L (ref 4–11)

## 2018-07-12 PROCEDURE — 80061 LIPID PANEL: CPT

## 2018-07-12 PROCEDURE — 36415 COLL VENOUS BLD VENIPUNCTURE: CPT

## 2018-07-12 PROCEDURE — 85025 COMPLETE CBC W/AUTO DIFF WBC: CPT

## 2018-07-12 PROCEDURE — 80053 COMPREHEN METABOLIC PANEL: CPT

## 2018-07-12 NOTE — LETTER
July 12, 2018      Sana Ward  [address]   [place] [facility] [place] [address]        Dear ,    We are writing to inform you of your test results.    Lipid profile, kidney function, liver function and hemoglobin are stable. The current medical regimen is effective;  continue present plan and medications    Resulted Orders   Lipid panel reflex to direct LDL Fasting   Result Value Ref Range    Cholesterol 125 <200 mg/dL    Triglycerides 78 <150 mg/dL      Comment:      Fasting specimen    HDL Cholesterol 44 (L) >49 mg/dL    LDL Cholesterol Calculated 65 <100 mg/dL      Comment:      Desirable:       <100 mg/dl    Non HDL Cholesterol 81 <130 mg/dL   CBC with platelets and differential   Result Value Ref Range    WBC 6.6 4.0 - 11.0 10e9/L    RBC Count 4.27 3.8 - 5.2 10e12/L    Hemoglobin 12.5 11.7 - 15.7 g/dL    Hematocrit 38.2 35.0 - 47.0 %    MCV 90 78 - 100 fl    MCH 29.3 26.5 - 33.0 pg    MCHC 32.7 31.5 - 36.5 g/dL    RDW 14.5 10.0 - 15.0 %    Platelet Count 256 150 - 450 10e9/L    Diff Method Automated Method     % Neutrophils 78.8 %    % Lymphocytes 10.6 %    % Monocytes 7.1 %    % Eosinophils 3.3 %    % Basophils 0.2 %    Absolute Neutrophil 5.2 1.6 - 8.3 10e9/L    Absolute Lymphocytes 0.7 (L) 0.8 - 5.3 10e9/L    Absolute Monocytes 0.5 0.0 - 1.3 10e9/L    Absolute Eosinophils 0.2 0.0 - 0.7 10e9/L    Absolute Basophils 0.0 0.0 - 0.2 10e9/L   Comprehensive metabolic panel (BMP + Alb, Alk Phos, ALT, AST, Total. Bili, TP)   Result Value Ref Range    Sodium 141 133 - 144 mmol/L    Potassium 4.0 3.4 - 5.3 mmol/L    Chloride 106 94 - 109 mmol/L    Carbon Dioxide 27 20 - 32 mmol/L    Anion Gap 8 3 - 14 mmol/L    Glucose 105 (H) 70 - 99 mg/dL      Comment:      Fasting specimen    Urea Nitrogen 20 7 - 30 mg/dL    Creatinine 0.87 0.52 - 1.04 mg/dL    GFR Estimate 68 >60 mL/min/1.7m2      Comment:      Non  GFR Calc    GFR Estimate If Black 82 >60 mL/min/1.7m2      Comment:        GFR Calc    Calcium 8.3 (L) 8.5 - 10.1 mg/dL    Bilirubin Total 0.3 0.2 - 1.3 mg/dL    Albumin 3.2 (L) 3.4 - 5.0 g/dL    Protein Total 7.2 6.8 - 8.8 g/dL    Alkaline Phosphatase 91 40 - 150 U/L    ALT 18 0 - 50 U/L    AST 13 0 - 45 U/L       If you have any questions or concerns, please call the clinic at the number listed above.       Sincerely,      Beck Martinez M.D.

## 2018-07-19 DIAGNOSIS — I10 HYPERTENSION GOAL BP (BLOOD PRESSURE) < 130/80: ICD-10-CM

## 2018-07-19 NOTE — TELEPHONE ENCOUNTER
"Requested Prescriptions   Pending Prescriptions Disp Refills     losartan (COZAAR) 50 MG tablet [Pharmacy Med Name: LOSARTAN 50MG TABLET] 90 tablet     Last Written Prescription Date:  04/10/2018  Last Fill Quantity: 90,  # refills: 0   Last office visit: 9/25/2017 with prescribing provider:  09/25/2017   Future Office Visit:     Sig: TAKE 1 TABLET BY MOUTH ONCE DAILY    Angiotensin-II Receptors Passed    7/19/2018  9:48 AM       Passed - Blood pressure under 140/90 in past 12 months    BP Readings from Last 3 Encounters:   09/25/17 104/70   08/31/17 106/72   05/15/17 112/78                Passed - Recent (12 mo) or future (30 days) visit within the authorizing provider's specialty    Patient had office visit in the last 12 months or has a visit in the next 30 days with authorizing provider or within the authorizing provider's specialty.  See \"Patient Info\" tab in inbasket, or \"Choose Columns\" in Meds & Orders section of the refill encounter.           Passed - Patient is age 18 or older       Passed - No active pregnancy on record       Passed - Normal serum creatinine on file in past 12 months    Recent Labs   Lab Test  07/12/18   0745   CR  0.87            Passed - Normal serum potassium on file in past 12 months    Recent Labs   Lab Test  07/12/18   0745   POTASSIUM  4.0                   Passed - No positive pregnancy test in past 12 months          "

## 2018-07-20 RX ORDER — LOSARTAN POTASSIUM 50 MG/1
TABLET ORAL
Qty: 90 TABLET | Refills: 0 | Status: SHIPPED | OUTPATIENT
Start: 2018-07-20 | End: 2018-10-11

## 2018-07-20 NOTE — TELEPHONE ENCOUNTER
Prescription approved per [facility] Refill Protocol.    Nieves Payne RN  [facility]

## 2018-08-11 DIAGNOSIS — Z93.2 STATUS POST ILEOSTOMY (H): ICD-10-CM

## 2018-08-13 RX ORDER — KARAYA GUM
POWDER (GRAM) TOPICAL
Qty: 50 EACH | Refills: 3 | Status: SHIPPED | OUTPATIENT
Start: 2018-08-13 | End: 2019-10-29

## 2018-08-13 NOTE — TELEPHONE ENCOUNTER
Ostomy Supplies      Last Written Prescription Date:  6/5/17  Last Fill Quantity: 50,   # refills: 5  Last Office Visit: 9/25/17  Future Office visit:       Routing refill request to provider for review/approval because:  Drug not on the [facility], [facility] or [facility] refill protocol or controlled substance

## 2018-08-13 NOTE — TELEPHONE ENCOUNTER
Requested Prescriptions   Pending Prescriptions Disp Refills     Ostomy Supplies (SKIN GEL PROTECT DRESSING WIPE) PADS 50 each      Sig: USE EVERY DAY AS DIRECTED    There is no refill protocol information for this order

## 2018-08-23 DIAGNOSIS — Z93.2 STATUS POST ILEOSTOMY (H): ICD-10-CM

## 2018-08-23 NOTE — TELEPHONE ENCOUNTER
"Requested Prescriptions   Pending Prescriptions Disp Refills     CARAFATE 1 GM/10ML suspension [Pharmacy Med Name: CARAFATE 1G/10ML SUSP] 100 mL     Last Written Prescription Date:  6-18-18  Last Fill Quantity: 100 mL,  # refills: 1   Last office visit: 9/25/2017 with prescribing provider:  9-25-17   Future Office Visit:     Sig: APPLY A SMALL AMOUNT TO PARA STOMA 1-2 TIMES DAILY    Miscellaneous Gastrointestinal Agents Passed    8/23/2018 11:06 AM       Passed - Recent (12 mo) or future (30 days) visit within the authorizing provider's specialty    Patient had office visit in the last 12 months or has a visit in the next 30 days with authorizing provider or within the authorizing provider's specialty.  See \"Patient Info\" tab in inbasket, or \"Choose Columns\" in Meds & Orders section of the refill encounter.           Passed - Patient is 18 years of age or older          "

## 2018-08-24 RX ORDER — SUCRALFATE 1 G/10ML
SUSPENSION ORAL
Qty: 100 ML | Refills: 0 | Status: SHIPPED | OUTPATIENT
Start: 2018-08-24 | End: 2018-09-22

## 2018-08-24 NOTE — TELEPHONE ENCOUNTER
Routing refill request to provider for review/approval because:  Linda given x1 and patient did not follow up, please advise  Patient needs to be seen because:  Due for follow up    CAMELIA Beaulieu, RN  [facility]

## 2018-09-21 DIAGNOSIS — Z93.2 ILEOSTOMY STATUS (H): ICD-10-CM

## 2018-09-21 DIAGNOSIS — Z93.2 STATUS POST ILEOSTOMY (H): ICD-10-CM

## 2018-09-21 DIAGNOSIS — E78.5 HYPERLIPIDEMIA LDL GOAL <100: ICD-10-CM

## 2018-09-21 NOTE — TELEPHONE ENCOUNTER
"Requested Prescriptions   Pending Prescriptions Disp Refills     simvastatin (ZOCOR) 20 MG tablet [Pharmacy Med Name: SIMVASTATIN 20MG TAB] 90 tablet     Last Written Prescription Date:  6-29-18  Last Fill Quantity: 90,  # refills: 0   Last office visit: 9/25/2017 with prescribing provider:  9-25-17   Future Office Visit:     Sig: TAKE 1 TABLET BY MOUTH DAILY AT BEDTIME    Statins Protocol Passed    9/21/2018  2:31 PM       Passed - LDL on file in past 12 months    Recent Labs   Lab Test  07/12/18   0745   LDL  65            Passed - No abnormal creatine kinase in past 12 months    No lab results found.            Passed - Recent (12 mo) or future (30 days) visit within the authorizing provider's specialty    Patient had office visit in the last 12 months or has a visit in the next 30 days with authorizing provider or within the authorizing provider's specialty.  See \"Patient Info\" tab in inbasket, or \"Choose Columns\" in Meds & Orders section of the refill encounter.           Passed - Patient is age 18 or older       Passed - No active pregnancy on record       Passed - No positive pregnancy test in past 12 months          "

## 2018-09-22 DIAGNOSIS — Z93.2 STATUS POST ILEOSTOMY (H): ICD-10-CM

## 2018-09-24 RX ORDER — SUCRALFATE 1 G/10ML
SUSPENSION ORAL
Qty: 100 ML | Refills: 0 | Status: SHIPPED | OUTPATIENT
Start: 2018-09-24 | End: 2019-01-18

## 2018-09-24 RX ORDER — SIMVASTATIN 20 MG
TABLET ORAL
Qty: 30 TABLET | Refills: 0 | Status: SHIPPED | OUTPATIENT
Start: 2018-09-24 | End: 2018-10-26

## 2018-09-24 NOTE — TELEPHONE ENCOUNTER
"Requested Prescriptions   Pending Prescriptions Disp Refills     CARAFATE 1 GM/10ML suspension [Pharmacy Med Name: CARAFATE 1G/10ML SUSP] 100 mL     Last Written Prescription Date:  8/24/18  Last Fill Quantity: 100,  # refills: 0   Last office visit: 9/25/2017 with prescribing provider:  9/25/17   Future Office Visit:     Sig: APPLY A SMALL AMOUNT TO PARA STOMA 1-2 TIMES DAILY    Miscellaneous Gastrointestinal Agents Passed    9/22/2018  9:44 AM       Passed - Recent (12 mo) or future (30 days) visit within the authorizing provider's specialty    Patient had office visit in the last 12 months or has a visit in the next 30 days with authorizing provider or within the authorizing provider's specialty.  See \"Patient Info\" tab in inbasket, or \"Choose Columns\" in Meds & Orders section of the refill encounter.           Passed - Patient is 18 years of age or older        "

## 2018-09-24 NOTE — TELEPHONE ENCOUNTER
Medication is being filled for 1 time refill only due to:  Patient needs to be seen because it has been more than one year since last visit.     Will route to scheduling to call and schedule.     CINTHIA BeaulieuN, RN  [facility]

## 2018-09-24 NOTE — TELEPHONE ENCOUNTER
Prescription approved per [facility] [place].    CINTHIA BeaulieuN, RN  [facility]

## 2018-10-08 ENCOUNTER — HOSPITAL ENCOUNTER (EMERGENCY)
Facility: CLINIC | Age: 53
Discharge: HOME | End: 2018-10-08
Attending: EMERGENCY MEDICINE | Admitting: EMERGENCY MEDICINE
Payer: MEDICARE

## 2018-10-08 VITALS
SYSTOLIC BLOOD PRESSURE: 127 MMHG | RESPIRATION RATE: 20 BRPM | DIASTOLIC BLOOD PRESSURE: 99 MMHG | OXYGEN SATURATION: 97 % | TEMPERATURE: 97.1 F

## 2018-10-08 DIAGNOSIS — R45.1 AGITATION: ICD-10-CM

## 2018-10-08 DIAGNOSIS — R34 DECREASED URINATION: ICD-10-CM

## 2018-10-08 DIAGNOSIS — F20.9 SUBCHRONIC SCHIZOPHRENIA (H): Primary | ICD-10-CM

## 2018-10-08 LAB
ALBUMIN UR-MCNC: NEGATIVE MG/DL
APPEARANCE UR: CLEAR
BILIRUB UR QL STRIP: NEGATIVE
COLOR UR AUTO: YELLOW
GLUCOSE UR STRIP-MCNC: NEGATIVE MG/DL
HGB UR QL STRIP: NEGATIVE
KETONES UR STRIP-MCNC: NEGATIVE MG/DL
LEUKOCYTE ESTERASE UR QL STRIP: NEGATIVE
MUCOUS THREADS #/AREA URNS LPF: PRESENT /LPF
NITRATE UR QL: NEGATIVE
PH UR STRIP: 5 PH (ref 5–7)
RBC #/AREA URNS AUTO: <1 /HPF (ref 0–2)
SOURCE: ABNORMAL
SP GR UR STRIP: 1.01 (ref 1–1.03)
SQUAMOUS #/AREA URNS AUTO: <1 /HPF (ref 0–1)
UROBILINOGEN UR STRIP-MCNC: 0 MG/DL (ref 0–2)
WBC #/AREA URNS AUTO: 1 /HPF (ref 0–5)

## 2018-10-08 PROCEDURE — 99284 EMERGENCY DEPT VISIT MOD MDM: CPT | Mod: 25 | Performed by: EMERGENCY MEDICINE

## 2018-10-08 PROCEDURE — 99284 EMERGENCY DEPT VISIT MOD MDM: CPT | Mod: Z6 | Performed by: EMERGENCY MEDICINE

## 2018-10-08 PROCEDURE — 51798 US URINE CAPACITY MEASURE: CPT | Performed by: EMERGENCY MEDICINE

## 2018-10-08 PROCEDURE — 81001 URINALYSIS AUTO W/SCOPE: CPT | Performed by: EMERGENCY MEDICINE

## 2018-10-08 NOTE — ED TRIAGE NOTES
Pt presents with multiple concerns.  Including blood pressure, high according to pt.  Pt states that she takes it at home and it is high.  Pt unable to tell the blood pressure results.  Pt's  came into triage and explained why they are here.  Pt was seen in the clinic today with possible concerns of a UTI.  Pt has been unable to void since yesterday.  Pt's  states they are here to only rule out a UTI.

## 2018-10-08 NOTE — ED AVS SNAPSHOT
[facility] Emergency Department    [address] [place] [address]    Phone:  [phone]    Fax:  [phone]                                       Sana Ward   MRN: [id number]    Department:  [facility] Emergency Department   Date of Visit:  10/8/2018           After Visit Summary Signature Page     I have received my discharge instructions, and my questions have been answered. I have discussed any challenges I see with this plan with the nurse or doctor.    ..........................................................................................................................................  Patient/Patient Representative Signature      ..........................................................................................................................................  Patient Representative Print Name and Relationship to Patient    ..................................................               ................................................  Date                                   Time    ..........................................................................................................................................  Reviewed by Signature/Title    ...................................................              ..............................................  Date                                               Time          22EPIC Rev 08/18

## 2018-10-08 NOTE — ED AVS SNAPSHOT
[facility]    [address]     [place] [address]    Phone:  [phone]    Fax:  [phone]                                       Sana Ward   MRN: [id number]    Department:  [facility]   Date of Visit:  10/8/2018           Patient Information     Date Of Birth          1965        Your diagnoses for this visit were:     Agitation     Decreased urination        You were seen by Harmony Raza MD.      Follow-up Information     Follow up with Beck Martinez MD.    Specialty:  Family Practice    Contact information:    [address] [facility] [address]  [phone]          Discharge Instructions       Contact your therapist/psychiatrist for medication management.    Return for concerns.    I hope the rest of your week goes well!!!    Your next 10 appointments already scheduled     Oct 09, 2018 11:15 AM CDT   [facility] SCREENING DIGITAL BIL[facility]AL [facility]   [facility]    [address] [facility] [id number] [phone]           How do I prepare for my exam? (Food and drink instructions) No Food and Drink Restrictions.  How do I prepare for my exam? (Other instructions) Do not use any powder, lotion or deodorant under your arms or on your breast. If you do, we will ask you to remove it before your exam.  What should I wear: Wear comfortable, two-piece clothing.  How long does the exam take: Most scans will take 15 minutes.  What should I bring: Bring any previous mammograms from other facilities or have them mailed to the breast center.  Do I need a :  No  is needed.  What do I need to tell my doctor: If you have any allergies, tell your care team.  What should I do after the exam: No restrictions, You may resume normal activities.  What is this test: This test is an x-ray of the breast to look for breast disease. The breast is pressed between two plates to flatten and spread  "the tissue. An X-ray is taken of the breast from different angles.  Who should I call with questions: If you have any questions, please call the Imaging Department where you will have your exam. Directions, parking instructions, and other information is available on our website, [email or web address].  Other information about my exam Three-dimensional (3D) mammograms are available at [facility] locations in [facility], [place], [place], [facility], [place], [facility] and [place].  Health locations include [place] and the [facility] and [facility] in [place].  Benefits of 3D mammograms include * Improved rate of cancer detection * Decreases your chance of having to go back for more tests, which means fewer: * \"False-positive\" results (This means that there is an abnormal area but it isn't cancer.) * Invasive testing procedures, such as a biopsy or surgery * Can provide clearer images of the breast if you have dense breast tissue.  *3D mammography is an optional exam that anyone can have with a 2D mammogram. It doesn't replace or take the place of a 2D mammogram. 2D mammograms remain an effective screening test for all women.  Not all insurance companies cover the cost of a 3D mammogram. Check with your insurance.            Oct 29, 2018 11:20 AM CDT   [facility] MD   [facility]    [address] [facility] [address] [phone]           Bring a current list of meds and any records pertaining to this visit. For Physicals, please bring immunization records and any forms needing to be filled out. Please arrive 10 minutes early to complete paperwork.              24 Hour Appointment Hotline       To make an appointment at any [facility], call [phone] ([phone]). If you don't have a family doctor or clinic, we will help you find one. [facility] are conveniently located to serve the needs of you and your " "family.             Review of your medicines      Our records show that you are taking the medicines listed below. If these are incorrect, please call your family doctor or clinic.        Dose / Directions Last dose taken    * ADAPT BARRIER RING 1-3/16\" Misc   Quantity:  10 each        USE EVERY DAY AS DIRECTED   Refills:  3        * PREMIER DRAINABLE Pouch Misc   Quantity:  20 each        USE EVERY DAY AS DIRECTED   Refills:  11        * ADAPT Pste   Quantity:  60 each        APPLY TO AFFECTED AREA AS DIRECTED   Refills:  11        * SKIN GEL PROTECT DRESSING WIPE Pads   Quantity:  50 each        USE EVERY DAY AS DIRECTED   Refills:  3        CARAFATE 1 GM/10ML suspension   Quantity:  100 mL   Generic drug:  sucralfate        APPLY A SMALL AMOUNT TO PARA STOMA 1-2 TIMES DAILY   Refills:  0        gabapentin 100 MG capsule   Commonly known as:  NEURONTIN   Quantity:  180 capsule        Take 100 mg capsule in Morning and 200 mg at bedtime.   Refills:  1        haloperidol 1 MG tablet   Commonly known as:  HALDOL   Dose:  1 mg        Take 1 mg by mouth as needed As needed for hallucinations, voices   Refills:  0        levothyroxine 50 MCG tablet   Commonly known as:  SYNTHROID/LEVOTHROID   Quantity:  90 tablet        TAKE 1 TABLET BY MOUTH EVERY DAY   Refills:  1        LORazepam 0.25 mg Tabs half-tab   Commonly known as:  ATIVAN   Dose:  0.25 mg   Quantity:  20 tablet        Take 0.25 mg by mouth At Bedtime.   Refills:  0        losartan 50 MG tablet   Commonly known as:  COZAAR   Quantity:  90 tablet        TAKE 1 TABLET BY MOUTH ONCE DAILY   Refills:  0        metFORMIN 500 MG tablet   Commonly known as:  GLUCOPHAGE   Quantity:  180 tablet        TAKE 1 TABLET BY MOUTH TWICE A DAY WITH MEALS   Refills:  1        NO STING BARRIER FILM Misc   Quantity:  25 each        USE EVERY DAY AS DIRECTED   Refills:  3        * order for DME   Quantity:  1 Box        Yassine Cohesive seals to be used every day as directed. " "  Refills:  3        * order for DME   Quantity:  1 Box        Sekou ostomy bags to be used as directed.   Refills:  6        order for DME   Quantity:  20 each        Equipment being ordered: ostomy supplies Premier Drainable Pouches MISC.   Refills:  6        * order for DME   Quantity:  90 each        daily Premier Drain Pouch 1.125\" to be used every day as directed.   Refills:  3        * order for DME   Quantity:  1 Box        Equipment being ordered: Premier Drainable Pouch Misc  Ostomy Supplies   Refills:  3        * order for DME   Quantity:  1 Box        Equipment being ordered: Ostomy Supplies( Premier Drainable Pouch 1.125\")   Refills:  11        * risperiDONE 3 MG tablet   Commonly known as:  risperDAL   Quantity:  30 tablet        Take 3.0 mg by mouth At Bedtime.   Refills:  1        * risperiDONE 0.5 MG tablet   Commonly known as:  risperDAL   Dose:  1 mg   Quantity:  30 tablet        Take 2 tablets (1 mg) by mouth 2 times daily Take 2 Tablets(1 mg) in the AM, and at  2 pm she needs to take 4 mg   Refills:  1        sertraline 100 MG tablet   Commonly known as:  ZOLOFT   Dose:  100 mg   Quantity:  90 tablet        Take 1 tablet (100 mg) by mouth daily   Refills:  1        simvastatin 20 MG tablet   Commonly known as:  ZOCOR   Quantity:  30 tablet        TAKE 1 TABLET BY MOUTH DAILY AT BEDTIME   Refills:  0        traZODone 100 MG tablet   Commonly known as:  DESYREL   Dose:  100 mg   Quantity:  90 tablet        Take 1 tablet (100 mg) by mouth At Bedtime   Refills:  1        * Notice:  This list has 11 medication(s) that are the same as other medications prescribed for you. Read the directions carefully, and ask your doctor or other care provider to review them with you.            Procedures and tests performed during your visit     Straight cath for urine    UA with Microscopic      Orders Needing Specimen Collection     None      Pending Results     No orders found from 10/6/2018 to 10/9/2018.    "         Pending Culture Results     No orders found from 10/6/2018 to 10/9/2018.            Pending Results Instructions     If you had any lab results that were not finalized at the time of your Discharge, you can call the ED Lab Result RN at [phone]. You will be contacted by this team for any positive Lab results or changes in treatment. The nurses are available 7 days a week from 10A to 6:30P.  You can leave a message 24 hours per day and they will return your call.        Thank you for choosing [facility]       Thank you for choosing [facility] for your care. Our goal is always to provide you with excellent care. Hearing back from our patients is one way we can continue to improve our services. Please take a few minutes to complete the written survey that you may receive in the mail after you visit with us. Thank you!        Care EveryWhere ID     This is your Care EveryWhere ID. This could be used by other organizations to access your [facility] medical records  [id number]        Equal Access to Services     PRABHJOT FLORES : Sterling Hou, santosh childs, kris aguirre, cecilia quiroz . So [facility] [phone].    ATENCIÓN: Si habla español, tiene a la disposición servicios gratuitos de asistencia lingüística. Llame al [phone].    We comply with applicable federal civil rights laws and [place] laws. We do not discriminate on the basis of race, color, national origin, age, disability, sex, sexual orientation, or gender identity.            After Visit Summary       This is your record. Keep this with you and show to your community pharmacist(s) and doctor(s) at your next visit.

## 2018-10-08 NOTE — DISCHARGE INSTRUCTIONS
Contact your therapist/psychiatrist for medication management.    Return for concerns.    I hope the rest of your week goes well!!!

## 2018-10-09 NOTE — ED PROVIDER NOTES
History     Chief Complaint   Patient presents with     Rule out Urinary Tract Infection     The history is provided by the spouse and medical records.     This is a 53-year-old female with history of schizophrenia, type 2 diabetes, hypertension, hyperlipidemia, ulcerative colitis/Crohn's disease status post bowel resection with colostomy, presenting with possible urinary tract infection.  Apparently, patient has been a little bit more agitated than usual.  Her psychiatrist group was considering changing her medications, but patient complained about not being able to urinate.  For this reason, they were sent to the ED to evaluate for possible urinary tract infection causing symptoms before they changed any of her medications.  Patient's  states that she has not urinated for about 14 hours.  She has been drinking fluids.  No noted fevers, chills, nausea, vomiting.  She has had one urinary tract infection in the past.  No complaints of urinary symptoms.  No blood in the urine prior to this.  She denies abdominal pain or back pain.  No weakness or dizziness, chest pain, shortness of breath, cold or cough symptoms, headache.  No change in colostomy output.    Problem List:    Patient Active Problem List    Diagnosis Date Noted     Obesity, Class III, BMI 40-49.9 (morbid obesity) (H) 10/28/2015     Priority: Medium     Status post ileostomy (H) 01/31/2014     Priority: Medium     Hypothyroidism due to acquired atrophy of thyroid 04/19/2013     Priority: Medium     Methamphetamine addiction (H) 08/01/2012     Priority: Medium     Acute exacerbation of chronic paranoid schizophrenia (H) 08/01/2012     Priority: Medium     Generalized anxiety disorder 06/15/2012     Priority: Medium     Diagnosis updated by automated process. Provider to review and confirm.       Type 2 diabetes mellitus without complication (H) 06/15/2012     Priority: Medium     Advanced directives, counseling/discussion 11/03/2011     Priority:  Medium     Advance Care Planning 6/24/2015: Receipt of ACP document:  Received: Health Care Directive which was witnessed or notarized on 6/19/15.  Document not previously scanned.  Validation form completed and sent with document to be scanned.  Code Status reflects choices in most recent ACP document.  Confirmed/documented designated decision maker(s).  Added by Jimbo Fernández            Advance Care Planning:   Receipt of ACP document:  Received: Health Care Directive which was witnessed or notarized on 8-.  Document not previously scanned.  Validation form completed and sent with document to be scanned.  Confirmed/documented designated decision maker(s). See permanent comments section of demographics in clinical tab. View document(s) and details by clicking on code status.   Added by Yesica Betts on 9/8/2014.       Mental health disorder 10/17/2011     Priority: Medium     Hypertension goal BP (blood pressure) < 130/80 05/02/2011     Priority: Medium     Hyperlipidemia LDL goal <100 05/02/2011     Priority: Medium        Past Medical History:    Past Medical History:   Diagnosis Date     Delirium 11/19/11     Diabetes mellitus, type 2 (H) 6/15/2012     Diabetic eye exam (H) 5/12/14     Generalised anxiety disorder 6/15/2012     Hyperthyroidism      Obesity, Class III, BMI 40-49.9 (morbid obesity) (H) 10/28/2015     Post traumatic stress disorder        Past Surgical History:    Past Surgical History:   Procedure Laterality Date     COLECTOMY  9/11/2001    total colectomy due to Crohn's     GI SURGERY  2003    has ostomy bag      HERNIA REPAIR      over 20 years ago       Family History:    Family History   Problem Relation Age of Onset     Diabetes Maternal Grandmother      Diabetes Maternal Grandfather        Social History:  Marital Status:   [2]  Social History   Substance Use Topics     Smoking status: Never Smoker     Smokeless tobacco: Never Used     Alcohol use No        Medications:   "    CARAFATE 1 GM/10ML suspension   gabapentin (NEURONTIN) 100 MG capsule   haloperidol (HALDOL) 1 MG tablet   levothyroxine (SYNTHROID/LEVOTHROID) 50 MCG tablet   LORazepam (ATIVAN) 0.25 MG TABS   losartan (COZAAR) 50 MG tablet   metFORMIN (GLUCOPHAGE) 500 MG tablet   order for DME   order for DME   order for DME   ORDER FOR DME   ORDER FOR DME   ORDER FOR DME   Ostomy Supplies (ADAPT BARRIER RING 1-3/16\") MISC   Ostomy Supplies (ADAPT) PSTE   Ostomy Supplies (PREMIER DRAINABLE) Pouch MISC   Ostomy Supplies (SKIN GEL PROTECT DRESSING WIPE) PADS   risperiDONE (RISPERDAL) 0.5 MG tablet   risperiDONE (RISPERDAL) 3 MG tablet   sertraline (ZOLOFT) 100 MG tablet   simvastatin (ZOCOR) 20 MG tablet   Skin Protectants, Misc. (NO STING BARRIER FILM) MISC   traZODone (DESYREL) 100 MG tablet         Review of Systems   All other ROS reviewed and are negative or non-contributory except as stated in HPI.     Physical Exam   BP: (!) 127/99  Heart Rate: 98  Temp: 97.1  F (36.2  C)  Resp: 20  SpO2: 97 %      Physical Exam   Constitutional: She appears well-developed and well-nourished.   Morbidly obese, slightly confused but pleasant older appearing female   HENT:   Head: Normocephalic.   Nose: Nose normal.   Slightly tacky mucous membranes   Eyes: EOM are normal.   Neck: Normal range of motion. Neck supple.   Cardiovascular: Normal rate, regular rhythm, normal heart sounds and intact distal pulses.    Pulmonary/Chest: Effort normal and breath sounds normal.   Abdominal: Soft.   Colostomy site is pink and normal-appearing with normal stool   Musculoskeletal: Normal range of motion.   Neurological: She is alert. She exhibits normal muscle tone.   Skin: Skin is warm and dry. She is not diaphoretic.   Psychiatric:   Patient is mildly anxious.  She is calmed easily by her    Vitals reviewed.      ED Course (with Medical Decision Making)    Pt seen and examined by me.  RN and EPIC notes reviewed.      Patient with complaints of " difficulty urinating, concerns for mild agitation, possible UTI.  A bedside bladder scan was done which shows about 250 cc of urine.  She was straight cathed and urine was sent.  No obvious signs of infection.  Results were discussed with patient and her .  They are going to follow-up with the psychiatry service and discuss changing/adding medications as recommended with regards to her psychiatric medications.  However, if they have any other worries or concerns they can return at any time.       Procedures    Results for orders placed or performed during the hospital encounter of 10/08/18 (from the past 24 hour(s))   UA with Microscopic   Result Value Ref Range    Color Urine Yellow     Appearance Urine Clear     Glucose Urine Negative NEG^Negative mg/dL    Bilirubin Urine Negative NEG^Negative    Ketones Urine Negative NEG^Negative mg/dL    Specific Gravity Urine 1.012 1.003 - 1.035    Blood Urine Negative NEG^Negative    pH Urine 5.0 5.0 - 7.0 pH    Protein Albumin Urine Negative NEG^Negative mg/dL    Urobilinogen mg/dL 0.0 0.0 - 2.0 mg/dL    Nitrite Urine Negative NEG^Negative    Leukocyte Esterase Urine Negative NEG^Negative    Source Catheterized Urine     WBC Urine 1 0 - 5 /HPF    RBC Urine <1 0 - 2 /HPF    Squamous Epithelial /HPF Urine <1 0 - 1 /HPF    Mucous Urine Present (A) NEG^Negative /LPF       Medications - No data to display    Assessments & Plan     I have reviewed the findings, diagnosis, plan and need for follow up with the patient and     Discharge Medication List as of 10/8/2018  5:42 PM          Final diagnoses:   Agitation   Decreased urination     Disposition: Patient discharged home in stable condition.  Plan as above.  Return for concerns.     Note: Chart documentation done in part with Dragon Voice Recognition software. Although reviewed after completion, some word and grammatical errors may remain.     10/8/2018   [facility] EMERGENCY DEPARTMENT     Harmony Raza  MD Pia  10/09/18 [id number]

## 2018-10-11 DIAGNOSIS — I10 HYPERTENSION GOAL BP (BLOOD PRESSURE) < 130/80: ICD-10-CM

## 2018-10-11 NOTE — TELEPHONE ENCOUNTER
"LOSARTAN 50MG TABLET  Requested Prescriptions   Pending Prescriptions Disp Refills     losartan (COZAAR) 50 MG tablet [Pharmacy Med Name: LOSARTAN 50MG TABLET] 90 tablet      Sig: TAKE 1 TABLET BY MOUTH ONCE DAILY    Angiotensin-II Receptors Failed    10/11/2018  1:15 PM       Failed - Blood pressure under 140/90 in past 12 months    BP Readings from Last 3 Encounters:   10/08/18 (!) 127/99   09/25/17 104/70   08/31/17 106/72                Passed - Recent (12 mo) or future (30 days) visit within the authorizing provider's specialty    Patient had office visit in the last 12 months or has a visit in the next 30 days with authorizing provider or within the authorizing provider's specialty.  See \"Patient Info\" tab in inbasket, or \"Choose Columns\" in Meds & Orders section of the refill encounter.           Passed - Patient is age 18 or older       Passed - No active pregnancy on record       Passed - Normal serum creatinine on file in past 12 months    Recent Labs   Lab Test  07/12/18   0745   CR  0.87            Passed - Normal serum potassium on file in past 12 months    Recent Labs   Lab Test  07/12/18   0745   POTASSIUM  4.0                   Passed - No positive pregnancy test in past 12 months        Last Written Prescription Date:  7/201/2018  Last Fill Quantity: 90,  # refills: 0   Last office visit: 9/25/2017 with prescribing provider:  JETHRO   Future Office Visit:   Next 5 appointments (look out 90 days)     Oct 29, 2018 11:20 AM CDT   [facility]    [address] [facility] [address] [phone]                   "

## 2018-10-12 RX ORDER — LOSARTAN POTASSIUM 50 MG/1
TABLET ORAL
Qty: 30 TABLET | Refills: 0 | Status: SHIPPED | OUTPATIENT
Start: 2018-10-12 | End: 2018-11-07

## 2018-10-12 NOTE — TELEPHONE ENCOUNTER
Medication is being filled for 1 time refill only due to:  Patient needs to be seen because it has been more than one year since last visit.     CINTHIA BeaulieuN, RN  [facility]

## 2018-10-16 DIAGNOSIS — Z93.2 STATUS POST ILEOSTOMY (H): ICD-10-CM

## 2018-10-16 RX ORDER — PROTECTIVES, O.U.
SWAB, MEDICATED TOPICAL
Qty: 25 EACH | Refills: 3 | Status: SHIPPED | OUTPATIENT
Start: 2018-10-16

## 2018-10-16 NOTE — TELEPHONE ENCOUNTER
Skin Protectants      Last Written Prescription Date:  10/27/16  Last Fill Quantity: 25,   # refills: 3  Last Office Visit: 9-27-17  Future Office visit:    Next 5 appointments (look out 90 days)     Oct 29, 2018 11:20 AM CDT   Office Visit with Beck Martinez MD   [facility]    [address] [facility] [address]   [phone]                   Routing refill request to provider for review/approval because:  Drug not on the [facility], [facility] or [facility] refill protocol or controlled substance

## 2018-11-07 DIAGNOSIS — Z93.2 STATUS POST ILEOSTOMY (H): ICD-10-CM

## 2018-11-07 DIAGNOSIS — E03.4 HYPOTHYROIDISM DUE TO ACQUIRED ATROPHY OF THYROID: ICD-10-CM

## 2018-11-07 DIAGNOSIS — I10 HYPERTENSION GOAL BP (BLOOD PRESSURE) < 130/80: ICD-10-CM

## 2018-11-09 RX ORDER — LOSARTAN POTASSIUM 50 MG/1
TABLET ORAL
Qty: 30 TABLET | Refills: 1 | Status: SHIPPED | OUTPATIENT
Start: 2018-11-09 | End: 2018-12-13

## 2018-11-09 RX ORDER — OSTOMY SUPPLY
EACH MISCELLANEOUS
Qty: 10 EACH | Refills: 11 | Status: SHIPPED | OUTPATIENT
Start: 2018-11-09

## 2018-11-09 RX ORDER — LEVOTHYROXINE SODIUM 50 UG/1
TABLET ORAL
Qty: 90 TABLET | Refills: 0 | Status: SHIPPED | OUTPATIENT
Start: 2018-11-09 | End: 2018-12-14

## 2018-11-09 NOTE — TELEPHONE ENCOUNTER
"Losartan Last Written Prescription Date:  10/12/18  Last Fill Quantity: 30,  # refills: 0   Last office visit: 9/25/2017 with prescribing provider:  Beck Martinez   Future Office Visit:      Levothyroxine Last Written Prescription Date:  5/8/18  Last Fill Quantity: 90,  # refills: 1   Last office visit: 9/25/2017 with prescribing provider:  Beck Martinez   Future Office Visit:      Ostomy supplies Last Written Prescription Date:  5/16/18  Last Fill Quantity: 60,  # refills: 11   Last office visit: 9/25/2017 with prescribing provider:  Sangita Wade   Future Office Visit:      Requested Prescriptions   Pending Prescriptions Disp Refills     losartan (COZAAR) 50 MG tablet [Pharmacy Med Name: LOSARTAN 50MG TABLET] 30 tablet      Sig: TAKE 1 TABLET BY MOUTH ONCE DAILY    Angiotensin-II Receptors Failed    11/7/2018  8:37 AM       Failed - Blood pressure under 140/90 in past 12 months    BP Readings from Last 3 Encounters:   10/08/18 (!) 127/99   09/25/17 104/70   08/31/17 106/72                Passed - Recent (12 mo) or future (30 days) visit within the authorizing provider's specialty    Patient had office visit in the last 12 months or has a visit in the next 30 days with authorizing provider or within the authorizing provider's specialty.  See \"Patient Info\" tab in inbasket, or \"Choose Columns\" in Meds & Orders section of the refill encounter.             Passed - Patient is age 18 or older       Passed - No active pregnancy on record       Passed - Normal serum creatinine on file in past 12 months    Recent Labs   Lab Test  07/12/18   0745   CR  0.87            Passed - Normal serum potassium on file in past 12 months    Recent Labs   Lab Test  07/12/18   0745   POTASSIUM  4.0                   Passed - No positive pregnancy test in past 12 months        levothyroxine (SYNTHROID/LEVOTHROID) 50 MCG tablet [Pharmacy Med Name: LEVOTHYROXINE 50MCG TAB] 90 tablet      Sig: TAKE 1 TABLET BY MOUTH EVERY DAY    Thyroid " "Protocol Failed    11/7/2018  8:37 AM       Failed - Normal TSH on file in past 12 months    Recent Labs   Lab Test  09/25/17   1123   TSH  1.99             Passed - Patient is 12 years or older       Passed - Recent (12 mo) or future (30 days) visit within the authorizing provider's specialty    Patient had office visit in the last 12 months or has a visit in the next 30 days with authorizing provider or within the authorizing provider's specialty.  See \"Patient Info\" tab in inbasket, or \"Choose Columns\" in Meds & Orders section of the refill encounter.             Passed - No active pregnancy on record    If patient is pregnant or has had a positive pregnancy test, please check TSH.         Passed - No positive pregnancy test in past 12 months    If patient is pregnant or has had a positive pregnancy test, please check TSH.          Ostomy Supplies (ADAPT BARRIER RING 1-3/16\") MISC [Pharmacy Med Name: Ostomy Supplies (ADAPT BARRIER RING1-3/16\") MISC] 10 each      Sig: USE EVERY DAY AS DIRECTED    There is no refill protocol information for this order        Routing refill request to provider for review/approval because:  Drug not on the [facility] refill protocol - Ostomy Supplies  Labs out of range:  BP  Labs not current:  TSH  Patient needs to be seen because it has been more than 1 year since last office visit.  Linda given with no follow up from patient.    Chayito Arndt RN          "

## 2018-11-13 DIAGNOSIS — Z93.2 STATUS POST ILEOSTOMY (H): ICD-10-CM

## 2018-11-15 NOTE — TELEPHONE ENCOUNTER
"Requested Prescriptions   Pending Prescriptions Disp Refills     metFORMIN (GLUCOPHAGE) 500 MG tablet [Pharmacy Med Name: METFORMIN 500MG TAB] 180 tablet     Last Written Prescription Date:  5/8/2018  Last Fill Quantity: 180,  # refills: 1   Last office visit: 9/25/2017 with prescribing provider:  Juan   Future Office Visit:     Sig: TAKE 1 TABLET BY MOUTH TWICE A DAY WITH MEALS    Biguanide Agents Failed    11/13/2018  2:13 PM       Failed - Blood pressure less than 140/90 in past 6 months    BP Readings from Last 3 Encounters:   10/08/18 (!) 127/99   09/25/17 104/70   08/31/17 106/72          Failed - Patient has had a Microalbumin in the past 15 mos.    Recent Labs   Lab Test  03/24/17   1144   MICROL  17   UMALCR  12.73            Failed - Patient has documented A1c within the specified period of time.    If HgbA1C is 8 or greater, it needs to be on file within the past 3 months.  If less than 8, must be on file within the past 6 months.       Recent Labs   Lab Test  09/25/17   1123   A1C  5.9            Passed - Patient has documented LDL within the past 12 mos.    Recent Labs   Lab Test  07/12/18   0745   LDL  65          Passed - Patient is age 10 or older       Passed - Patient's CR is NOT>1.4 OR Patient's EGFR is NOT<45 within past 12 mos.    Recent Labs   Lab Test  07/12/18   0745   GFRESTIMATED  68   GFRESTBLACK  82       Recent Labs   Lab Test  07/12/18   0745   CR  0.87          Passed - Patient does NOT have a diagnosis of CHF.       Passed - Patient is not pregnant       Passed - Patient has not had a positive pregnancy test within the past 12 mos.        Passed - Recent (6 mo) or future (30 days) visit within the authorizing provider's specialty    Patient had office visit in the last 6 months or has a visit in the next 30 days with authorizing provider or within the authorizing provider's specialty.  See \"Patient Info\" tab in inbasket, or \"Choose Columns\" in Meds & Orders section of the refill " encounter.

## 2018-11-26 ENCOUNTER — HEALTH MAINTENANCE LETTER (OUTPATIENT)
Age: 53
End: 2018-11-26

## 2018-12-04 ENCOUNTER — RADIANT APPOINTMENT (OUTPATIENT)
Dept: MAMMOGRAPHY | Facility: OTHER | Age: 53
End: 2018-12-04
Attending: FAMILY MEDICINE
Payer: MEDICARE

## 2018-12-04 DIAGNOSIS — Z12.31 VISIT FOR SCREENING MAMMOGRAM: ICD-10-CM

## 2018-12-04 PROCEDURE — 77067 SCR MAMMO BI INCL CAD: CPT | Mod: TC

## 2018-12-13 ENCOUNTER — OFFICE VISIT (OUTPATIENT)
Dept: FAMILY MEDICINE | Facility: OTHER | Age: 53
End: 2018-12-13
Payer: MEDICARE

## 2018-12-13 VITALS
BODY MASS INDEX: 43.29 KG/M2 | SYSTOLIC BLOOD PRESSURE: 128 MMHG | DIASTOLIC BLOOD PRESSURE: 88 MMHG | WEIGHT: 248.25 LBS | HEART RATE: 108 BPM | TEMPERATURE: 95.7 F | OXYGEN SATURATION: 100 % | RESPIRATION RATE: 18 BRPM

## 2018-12-13 DIAGNOSIS — Z93.2 STATUS POST ILEOSTOMY (H): ICD-10-CM

## 2018-12-13 DIAGNOSIS — E11.9 TYPE 2 DIABETES MELLITUS WITHOUT COMPLICATION, WITHOUT LONG-TERM CURRENT USE OF INSULIN (H): Primary | ICD-10-CM

## 2018-12-13 DIAGNOSIS — I10 HYPERTENSION GOAL BP (BLOOD PRESSURE) < 130/80: ICD-10-CM

## 2018-12-13 DIAGNOSIS — E03.4 HYPOTHYROIDISM DUE TO ACQUIRED ATROPHY OF THYROID: ICD-10-CM

## 2018-12-13 LAB
CREAT UR-MCNC: 127 MG/DL
HBA1C MFR BLD: 6.1 % (ref 0–5.6)
MICROALBUMIN UR-MCNC: 8 MG/L
MICROALBUMIN/CREAT UR: 6.36 MG/G CR (ref 0–25)
T4 FREE SERPL-MCNC: 1.2 NG/DL (ref 0.76–1.46)
TSH SERPL DL<=0.005 MIU/L-ACNC: 5.27 MU/L (ref 0.4–4)

## 2018-12-13 PROCEDURE — 84443 ASSAY THYROID STIM HORMONE: CPT | Performed by: FAMILY MEDICINE

## 2018-12-13 PROCEDURE — 84439 ASSAY OF FREE THYROXINE: CPT | Performed by: FAMILY MEDICINE

## 2018-12-13 PROCEDURE — 99214 OFFICE O/P EST MOD 30 MIN: CPT | Performed by: FAMILY MEDICINE

## 2018-12-13 PROCEDURE — 36415 COLL VENOUS BLD VENIPUNCTURE: CPT | Performed by: FAMILY MEDICINE

## 2018-12-13 PROCEDURE — 82043 UR ALBUMIN QUANTITATIVE: CPT | Performed by: FAMILY MEDICINE

## 2018-12-13 PROCEDURE — 83036 HEMOGLOBIN GLYCOSYLATED A1C: CPT | Performed by: FAMILY MEDICINE

## 2018-12-13 RX ORDER — LOSARTAN POTASSIUM 50 MG/1
50 TABLET ORAL DAILY
Qty: 90 TABLET | Refills: 3 | Status: SHIPPED | OUTPATIENT
Start: 2018-12-13 | End: 2020-01-03

## 2018-12-13 NOTE — PROGRESS NOTES
SUBJECTIVE:   Sana Ward is a 53 year old female who presents to clinic today for the following health issues:      Diabetes Follow-up      Patient is checking blood sugars: not at all    Diabetic concerns: None     Symptoms of hypoglycemia (low blood sugar): none     Paresthesias (numbness or burning in feet) or sores: No     Date of last diabetic eye exam: Unsure     BP Readings from Last 2 Encounters:   12/13/18 128/88   10/08/18 (!) 127/99     Hemoglobin A1C (%)   Date Value   12/13/2018 6.1 (H)   09/25/2017 5.9     LDL Cholesterol Calculated (mg/dL)   Date Value   07/12/2018 65   09/25/2017 37       Diabetes Management Resources    Amount of exercise or physical activity: 4-5 days/week for an average of 30-45 minutes    Problems taking medications regularly: No    Medication side effects: none    Diet: regular (no restrictions)            Problem list and histories reviewed & adjusted, as indicated.  Additional history: She continues to do well with her mental health. Followed by Dr. Valdez.     Patient Active Problem List   Diagnosis     Hypertension goal BP (blood pressure) < 130/80     Hyperlipidemia LDL goal <100     Mental health disorder     Advanced directives, counseling/discussion     Generalized anxiety disorder     Type 2 diabetes mellitus without complication (H)     Hypothyroidism due to acquired atrophy of thyroid     Status post ileostomy (H)     Obesity, Class III, BMI 40-49.9 (morbid obesity) (H)     Methamphetamine addiction (H)     Acute exacerbation of chronic paranoid schizophrenia (H)     Paranoid schizophrenia, chronic condition with acute exacerbation (H)     Psychosis (H)     Past Surgical History:   Procedure Laterality Date     COLECTOMY  9/11/2001    total colectomy due to Crohn's     GI SURGERY  2003    has ostomy bag      HERNIA REPAIR      over 20 years ago       Social History     Tobacco Use     Smoking status: Never Smoker     Smokeless tobacco: Never Used   Substance  "Use Topics     Alcohol use: No     Family History   Problem Relation Age of Onset     Diabetes Maternal Grandmother      Diabetes Maternal Grandfather          Current Outpatient Medications   Medication Sig Dispense Refill     CARAFATE 1 GM/10ML suspension APPLY A SMALL AMOUNT TO PARA STOMA 1-2 TIMES DAILY 100 mL 0     gabapentin (NEURONTIN) 100 MG capsule Take 100 mg capsule in Morning and 200 mg at bedtime. 180 capsule 1     haloperidol (HALDOL) 1 MG tablet Take 1 mg by mouth as needed As needed for hallucinations, voices       levothyroxine (SYNTHROID/LEVOTHROID) 50 MCG tablet TAKE 1 TABLET BY MOUTH EVERY DAY 90 tablet 0     LORazepam (ATIVAN) 0.25 MG TABS Take 0.25 mg by mouth At Bedtime.  20 tablet 0     losartan (COZAAR) 50 MG tablet TAKE 1 TABLET BY MOUTH ONCE DAILY 30 tablet 1     metFORMIN (GLUCOPHAGE) 500 MG tablet TAKE 1 TABLET BY MOUTH TWICE A DAY WITH MEALS 60 tablet 0     order for DME Equipment being ordered: Ostomy Supplies( Premier Drainable Pouch 1.125\") 1 Box 11     order for DME Equipment being ordered: Premier Drainable Pouch Misc    Ostomy Supplies 1 Box 3     order for DME daily Premier Drain Pouch 1.125\" to be used every day as directed. 90 each 3     ORDER FOR DME Equipment being ordered: ostomy supplies  Premier Drainable Pouches MISC. 20 each 6     ORDER FOR DME Sekou ostomy bags to be used as directed. 1 Box 6     ORDER FOR DME Yassine Cohesive seals to be used every day as directed. 1 Box 3     Ostomy Supplies (ADAPT BARRIER RING 1-3/16\") MISC USE EVERY DAY AS DIRECTED 10 each 11     Ostomy Supplies (ADAPT) PSTE APPLY TO AFFECTED AREA AS DIRECTED 60 each 11     Ostomy Supplies (PREMIER DRAINABLE) Pouch MISC USE EVERY DAY AS DIRECTED 20 each 11     Ostomy Supplies (SKIN GEL PROTECT DRESSING WIPE) PADS USE EVERY DAY AS DIRECTED 50 each 3     risperiDONE (RISPERDAL) 0.5 MG tablet Take 2 tablets (1 mg) by mouth 2 times daily Take 2 Tablets(1 mg) in the AM, and at  2 pm she needs to take 4 " mg 30 tablet 1     risperiDONE (RISPERDAL) 3 MG tablet Take 3.0 mg by mouth At Bedtime. 30 tablet 1     sertraline (ZOLOFT) 100 MG tablet Take 1 tablet (100 mg) by mouth daily 90 tablet 1     simvastatin (ZOCOR) 20 MG tablet Take 1 tablet (20 mg) by mouth At Bedtime 90 tablet 3     Skin Protectants, Misc. (NO STING BARRIER FILM) MISC USE EVERY DAY AS DIRECTED 25 each 3     traZODone (DESYREL) 100 MG tablet Take 1 tablet (100 mg) by mouth At Bedtime 90 tablet 1     Allergies   Allergen Reactions     Amoxicillin      Contrast Dye      Sulfa Drugs Hives     Recent Labs   Lab Test 12/13/18  1337 07/12/18  0745 09/25/17  1123 03/24/17  1135 03/03/16  0851   A1C 6.1*  --  5.9 5.8 6.0   LDL  --  65 37 66 48   HDL  --  44* 54 45* 44*   TRIG  --  78 71 70 104   ALT  --  18 15  --  17   CR  --  0.87 0.71 0.76 0.78   GFRESTIMATED  --  68 86 80 78   GFRESTBLACK  --  82 >90 >90   GFR Calc   >90   GFR Calc     POTASSIUM  --  4.0 4.1  --  4.3   TSH  --   --  1.99 3.72 5.06*      BP Readings from Last 3 Encounters:   12/13/18 128/88   10/08/18 (!) 127/99   09/25/17 104/70    Wt Readings from Last 3 Encounters:   12/13/18 112.6 kg (248 lb 4 oz)   09/25/17 107.4 kg (236 lb 12.8 oz)   08/31/17 107.5 kg (237 lb)                  Labs reviewed in EPIC    Reviewed and updated as needed this visit by clinical staff  Tobacco  Allergies  Meds  Problems  Med Hx  Surg Hx  Fam Hx       Reviewed and updated as needed this visit by Provider  Tobacco  Allergies  Meds  Problems  Med Hx  Surg Hx  Fam Hx         ROS:  CONSTITUTIONAL: NEGATIVE for fever, chills, change in weight  ENT/MOUTH: NEGATIVE for ear, mouth and throat problems  RESP: NEGATIVE for significant cough or SOB  CV: NEGATIVE for chest pain, palpitations or peripheral edema  ROS otherwise negative    OBJECTIVE:     /88 (BP Location: Left arm, Patient Position: Sitting, Cuff Size: Adult Large)   Pulse 108   Temp 95.7  F (35.4  C)  "(Tympanic)   Resp 18   Wt 112.6 kg (248 lb 4 oz)   SpO2 100%   Breastfeeding? No   BMI 43.29 kg/m    Body mass index is 43.29 kg/m .  GENERAL: alert, no distress and obese  NECK: no adenopathy, no asymmetry, masses, or scars and thyroid normal to palpation  RESP: lungs clear to auscultation - no rales, rhonchi or wheezes  CV: regular rate and rhythm, normal S1 S2, no S3 or S4, no murmur, click or rub, no peripheral edema and peripheral pulses strong  ABDOMEN: soft, nontender, no hepatosplenomegaly, no masses and bowel sounds normal  MS: no gross musculoskeletal defects noted, no edema  Diabetic foot exam: normal DP and PT pulses, no trophic changes or ulcerative lesions and normal sensory exam    Diagnostic Test Results:  Results for orders placed or performed in visit on 12/13/18 (from the past 24 hour(s))   Hemoglobin A1c   Result Value Ref Range    Hemoglobin A1C 6.1 (H) 0 - 5.6 %       ASSESSMENT/PLAN:     Diabetes Type II, A1c Controlled, non-insulin dependent   Associated with the following complications    Renal Complications:  None    Ophthalmologic Complications: None    Neurologic Complications: Peripheral autonomic neuropathy    Peripheral Vascular Complications:  Atherosclerosis of the extremities, unspecified    Other: Schizophrenia   Plan:  No changes in the patient's current treatment plan        BMI:   Estimated body mass index is 43.29 kg/m  as calculated from the following:    Height as of 3/24/17: 1.613 m (5' 3.5\").    Weight as of this encounter: 112.6 kg (248 lb 4 oz).   Weight management plan: Discussed healthy diet and exercise guidelines      3. Hypertension goal BP (blood pressure) < 130/80  Chronic controlled. The current medical regimen is effective;  continue present plan and medications. Follow up in 6 months.  - losartan (COZAAR) 50 MG tablet; Take 1 tablet (50 mg) by mouth daily  Dispense: 90 tablet; Refill: 3    4. Hypothyroidism due to acquired atrophy of thyroid  Chronic, " stable. Will recheck TFT and adjust dose pending results.  - TSH with free T4 reflex    FUTURE LABS:       - Schedule fasting labs in 6 months  FUTURE APPOINTMENTS:       - Follow-up visit in 6 months; 3 months if adjustment of levothyroxine is needed.  Work on weight loss  Regular exercise    Beck Martinez MD  [facility]

## 2018-12-14 RX ORDER — LEVOTHYROXINE SODIUM 75 UG/1
75 TABLET ORAL DAILY
Qty: 30 TABLET | Refills: 3 | Status: SHIPPED | OUTPATIENT
Start: 2018-12-14 | End: 2019-05-21

## 2018-12-14 NOTE — RESULT ENCOUNTER NOTE
Called pt - informed of result, new Rx and transferred to  to set up follow up appt.  ................Mendez Reeder LPN,   December 14, 2018,      9:38 AM,   [facility]

## 2019-01-18 DIAGNOSIS — Z93.2 STATUS POST ILEOSTOMY (H): ICD-10-CM

## 2019-01-22 RX ORDER — SUCRALFATE 1 G/10ML
SUSPENSION ORAL
Qty: 100 ML | Refills: 2 | Status: SHIPPED | OUTPATIENT
Start: 2019-01-22 | End: 2019-10-09

## 2019-01-22 NOTE — TELEPHONE ENCOUNTER
"Prescription approved per RN refill protocol.  Florence Mendes RN, BSN    Carafate  Last Written Prescription Date:  9/24/2018  Last Fill Quantity: 100,  # refills: 0   Last office visit: 12/13/2018 with prescribing provider:  12/13/2018   Future Office Visit:   Next 5 appointments (look out 90 days)    Mar 14, 2019  1:10 PM CDT  [facility] Beck Martinez [facility] [address] [facility] [address] [phone]           Requested Prescriptions   Pending Prescriptions Disp Refills     CARAFATE 1 GM/10ML suspension [Pharmacy Med Name: CARAFATE 1G/10ML SUSP] 100 mL 0     Sig: APPLY A SMALL AMOUNT TO PARA STOMA 1-2 TIMES DAILY **NEEDS OFFICE VISIT FOR ANY FURTHER REFILLS**    Miscellaneous Gastrointestinal Agents Passed - 1/18/2019 12:50 PM       Passed - Recent (12 mo) or future (30 days) visit within the authorizing provider's specialty    Patient had office visit in the last 12 months or has a visit in the next 30 days with authorizing provider or within the authorizing provider's specialty.  See \"Patient Info\" tab in inbasket, or \"Choose Columns\" in Meds & Orders section of the refill encounter.             Passed - Medication is active on med list       Passed - Patient is 18 years of age or older        Florence Mendes RN on 1/22/2019 at 11:35 AM    "

## 2019-04-04 ENCOUNTER — OFFICE VISIT (OUTPATIENT)
Dept: FAMILY MEDICINE | Facility: CLINIC | Age: 54
End: 2019-04-04
Payer: MEDICARE

## 2019-04-04 ENCOUNTER — HOSPITAL ENCOUNTER (OUTPATIENT)
Dept: GENERAL RADIOLOGY | Facility: CLINIC | Age: 54
Discharge: HOME | End: 2019-04-04
Attending: FAMILY MEDICINE | Admitting: FAMILY MEDICINE
Payer: MEDICARE

## 2019-04-04 VITALS
OXYGEN SATURATION: 96 % | DIASTOLIC BLOOD PRESSURE: 70 MMHG | HEIGHT: 63 IN | BODY MASS INDEX: 45.89 KG/M2 | TEMPERATURE: 97.2 F | WEIGHT: 259 LBS | SYSTOLIC BLOOD PRESSURE: 126 MMHG | HEART RATE: 76 BPM | RESPIRATION RATE: 18 BRPM

## 2019-04-04 DIAGNOSIS — L97.511 SKIN ULCER OF RIGHT FOOT, LIMITED TO BREAKDOWN OF SKIN (H): Primary | ICD-10-CM

## 2019-04-04 DIAGNOSIS — L97.511 SKIN ULCER OF RIGHT FOOT, LIMITED TO BREAKDOWN OF SKIN (H): ICD-10-CM

## 2019-04-04 PROCEDURE — 99213 OFFICE O/P EST LOW 20 MIN: CPT | Performed by: FAMILY MEDICINE

## 2019-04-04 PROCEDURE — 73630 X-RAY EXAM OF FOOT: CPT | Mod: TC,RT

## 2019-04-04 RX ORDER — CLINDAMYCIN HCL 300 MG
300 CAPSULE ORAL 3 TIMES DAILY
Qty: 30 CAPSULE | Refills: 0 | Status: SHIPPED | OUTPATIENT
Start: 2019-04-04 | End: 2019-06-05

## 2019-04-04 RX ORDER — CIPROFLOXACIN 500 MG/1
500 TABLET, FILM COATED ORAL 2 TIMES DAILY
Qty: 20 TABLET | Refills: 0 | Status: SHIPPED | OUTPATIENT
Start: 2019-04-04 | End: 2019-06-05

## 2019-04-04 RX ORDER — RISPERIDONE 1 MG/1
1 TABLET ORAL EVERY MORNING
Refills: 5 | COMMUNITY
Start: 2019-03-25

## 2019-04-04 ASSESSMENT — PAIN SCALES - GENERAL: PAINLEVEL: MODERATE PAIN (5)

## 2019-04-04 ASSESSMENT — MIFFLIN-ST. JEOR: SCORE: 1743.95

## 2019-04-04 NOTE — PROGRESS NOTES
SUBJECTIVE:   Sana Ward is a 54 year old female who presents to clinic today for the following health issues:        Foot Pain    Onset: couple weeks    Description:   Location: right foot  Character: Stabbing    Intensity: mild    Progression of Symptoms: same    Accompanying Signs & Symptoms:  Other symptoms: none    History:   Previous similar pain: no       Precipitating factors:   Trauma or overuse: no     Alleviating factors:  Improved by: nothing    Therapies Tried and outcome: Tylenol, ice, soaking foot and no relief.           Problem list and histories reviewed & adjusted, as indicated.  Additional history: Patient developmentally delayed. She doesn't recall stepping on anything, or puncture wound. She and , Sunny, use elliptical  30 minutes daily.    Patient Active Problem List   Diagnosis     Hypertension goal BP (blood pressure) < 130/80     Hyperlipidemia LDL goal <100     Mental health disorder     Advanced directives, counseling/discussion     Generalized anxiety disorder     Type 2 diabetes mellitus without complication (H)     Hypothyroidism due to acquired atrophy of thyroid     Status post ileostomy (H)     Obesity, Class III, BMI 40-49.9 (morbid obesity) (H)     Methamphetamine addiction (H)     Acute exacerbation of chronic paranoid schizophrenia (H)     Paranoid schizophrenia, chronic condition with acute exacerbation (H)     Psychosis (H)     Past Surgical History:   Procedure Laterality Date     COLECTOMY  9/11/2001    total colectomy due to Crohn's     GI SURGERY  2003    has ostomy bag      HERNIA REPAIR      over 20 years ago       Social History     Tobacco Use     Smoking status: Never Smoker     Smokeless tobacco: Never Used   Substance Use Topics     Alcohol use: No     Family History   Problem Relation Age of Onset     Diabetes Maternal Grandmother      Diabetes Maternal Grandfather          Current Outpatient Medications   Medication Sig Dispense Refill      "CARAFATE 1 GM/10ML suspension APPLY A SMALL AMOUNT TO PARA STOMA 1-2 TIMES DAILY **NEEDS OFFICE VISIT FOR ANY FURTHER REFILLS** 100 mL 2     gabapentin (NEURONTIN) 100 MG capsule Take 100 mg capsule in Morning and 200 mg at bedtime. 180 capsule 1     haloperidol (HALDOL) 1 MG tablet Take 1 mg by mouth as needed As needed for hallucinations, voices       levothyroxine (SYNTHROID/LEVOTHROID) 75 MCG tablet Take 1 tablet (75 mcg) by mouth daily 30 tablet 3     LORazepam (ATIVAN) 0.25 MG TABS Take 0.25 mg by mouth At Bedtime.  20 tablet 0     losartan (COZAAR) 50 MG tablet Take 1 tablet (50 mg) by mouth daily 90 tablet 3     metFORMIN (GLUCOPHAGE) 500 MG tablet TAKE 1 TABLET BY MOUTH TWICE A DAY WITH MEALS 180 tablet 3     order for DME Equipment being ordered: Ostomy Supplies( Premier Drainable Pouch 1.125\") 1 Box 11     order for DME Equipment being ordered: Premier Drainable Pouch Misc    Ostomy Supplies 1 Box 3     order for DME daily Premier Drain Pouch 1.125\" to be used every day as directed. 90 each 3     ORDER FOR DME Equipment being ordered: ostomy supplies  Premier Drainable Pouches MISC. 20 each 6     ORDER FOR DME Reader ostomy bags to be used as directed. 1 Box 6     ORDER FOR DME Yassine Cohesive seals to be used every day as directed. 1 Box 3     Ostomy Supplies (ADAPT BARRIER RING 1-3/16\") MISC USE EVERY DAY AS DIRECTED 10 each 11     Ostomy Supplies (ADAPT) PSTE APPLY TO AFFECTED AREA AS DIRECTED 60 each 11     Ostomy Supplies (PREMIER DRAINABLE) Pouch MISC USE EVERY DAY AS DIRECTED 20 each 11     Ostomy Supplies (SKIN GEL PROTECT DRESSING WIPE) PADS USE EVERY DAY AS DIRECTED 50 each 3     risperiDONE (RISPERDAL) 1 MG tablet Take 1 mg by mouth every morning  5     risperiDONE (RISPERDAL) 3 MG tablet Take 3.0 mg by mouth At Bedtime. 30 tablet 1     sertraline (ZOLOFT) 100 MG tablet Take 1 tablet (100 mg) by mouth daily 90 tablet 1     simvastatin (ZOCOR) 20 MG tablet Take 1 tablet (20 mg) by mouth At " "Bedtime 90 tablet 3     Skin Protectants, Misc. (NO STING BARRIER FILM) MISC USE EVERY DAY AS DIRECTED 25 each 3     traZODone (DESYREL) 100 MG tablet Take 1 tablet (100 mg) by mouth At Bedtime 90 tablet 1     Allergies   Allergen Reactions     Amoxicillin      Contrast Dye      Sulfa Drugs Hives     Recent Labs   Lab Test 12/13/18  1337 07/12/18  0745 09/25/17  1123 03/24/17  1135 03/03/16  0851   A1C 6.1*  --  5.9 5.8 6.0   LDL  --  65 37 66 48   HDL  --  44* 54 45* 44*   TRIG  --  78 71 70 104   ALT  --  18 15  --  17   CR  --  0.87 0.71 0.76 0.78   GFRESTIMATED  --  68 86 80 78   GFRESTBLACK  --  82 >90 >90   GFR Calc   >90   GFR Calc     POTASSIUM  --  4.0 4.1  --  4.3   TSH 5.27*  --  1.99 3.72 5.06*      BP Readings from Last 3 Encounters:   04/04/19 126/70   12/13/18 128/88   10/08/18 (!) 127/99    Wt Readings from Last 3 Encounters:   04/04/19 117.5 kg (259 lb)   12/13/18 112.6 kg (248 lb 4 oz)   09/25/17 107.4 kg (236 lb 12.8 oz)                  Labs reviewed in EPIC    Reviewed and updated as needed this visit by clinical staff  Tobacco  Allergies  Meds  Problems  Med Hx  Surg Hx  Fam Hx  Soc Hx        Reviewed and updated as needed this visit by Provider  Tobacco  Allergies  Meds  Problems  Med Hx  Surg Hx  Fam Hx         ROS:  Constitutional, HEENT, cardiovascular, pulmonary, gi and gu systems are negative, except as otherwise noted.    OBJECTIVE:     /70 (BP Location: Right arm, Patient Position: Chair, Cuff Size: Adult Large)   Pulse 76   Temp 97.2  F (36.2  C) (Temporal)   Resp 18   Ht 1.6 m (5' 3\")   Wt 117.5 kg (259 lb)   SpO2 96%   BMI 45.88 kg/m    Body mass index is 45.88 kg/m .  GENERAL: alert, no distress and obese  NECK: no adenopathy, no asymmetry, masses, or scars and thyroid normal to palpation  RESP: lungs clear to auscultation - no rales, rhonchi or wheezes  CV: regular rate and rhythm, normal S1 S2, no S3 or S4, no murmur, " click or rub, no peripheral edema and peripheral pulses strong  ABDOMEN: soft, nontender, no hepatosplenomegaly, no masses and bowel sounds normal  MS: no gross musculoskeletal defects noted, no edema  Diabetic foot exam: normal sensory exam, DP reduced bilaterally, PT reduced bilaterally, ulceration at right posterior heel 1 cm length linear ulceration with depth to subcutaneous fat with slight undermining, no drainage but blood on sock and in diabetic shoe,  and dry cracking heels. No erythema or heat. No obvious FB.    Diagnostic Test Results:  Xray - Right foot. No FB and no gas or edema.    RIGHT FOOT THREE OR MORE VIEWS   4/4/2019 6:19 PM      HISTORY: Skin ulcer of right foot, limited to breakdown of skin (H).     COMPARISON: None.                                                                       IMPRESSION: No acute fracture or dislocation. Hallux valgus. No bone  destruction to suggest osteomyelitis.    ASSESSMENT/PLAN:     1. Skin ulcer of right foot, limited to breakdown of skin (H)  Acute foot ulcer in diabetic. Diabetes has been in good control. Suspect intial insult was crack of thick callus on heal due to poorly fitting diabetic shoes as she exercised on elliptical. No drainage or erythema but she does have some undermining of the ulcer edge. Start Cipro and Clinda. Local daily wound care with soap and water and protective non stick pad. Recommend off loading foot by using walker or cane and will refer to Podiatry for consideration for additional off loading support to assure wound healing.  - XR Foot Right G/E 3 Views; Future  - ciprofloxacin (CIPRO) 500 MG tablet; Take 1 tablet (500 mg) by mouth 2 times daily for 10 days  Dispense: 20 tablet; Refill: 0  - clindamycin (CLEOCIN) 300 MG capsule; Take 1 capsule (300 mg) by mouth 3 times daily for 10 days  Dispense: 30 capsule; Refill: 0  - PODIATRY/FOOT & ANKLE SURGERY REFERRAL    CONSULTATION/REFERRAL to Podiatry  Return in about 2 weeks (around  4/18/2019) for recheck heel ulcer.     Beck Martinez MD  [facility]

## 2019-04-24 ENCOUNTER — OFFICE VISIT (OUTPATIENT)
Dept: PODIATRY | Facility: OTHER | Age: 54
End: 2019-04-24
Attending: FAMILY MEDICINE
Payer: MEDICARE

## 2019-04-24 VITALS
TEMPERATURE: 96.5 F | BODY MASS INDEX: 44.65 KG/M2 | DIASTOLIC BLOOD PRESSURE: 78 MMHG | WEIGHT: 252 LBS | HEIGHT: 63 IN | SYSTOLIC BLOOD PRESSURE: 130 MMHG

## 2019-04-24 DIAGNOSIS — L85.3 XEROSIS OF SKIN: ICD-10-CM

## 2019-04-24 DIAGNOSIS — L60.3 ONYCHODYSTROPHY: ICD-10-CM

## 2019-04-24 DIAGNOSIS — E08.621 DIABETES MELLITUS DUE TO UNDERLYING CONDITION WITH FOOT ULCER (CODE) (H): Primary | ICD-10-CM

## 2019-04-24 DIAGNOSIS — E11.42 TYPE 2 DIABETES, CONTROLLED, WITH PERIPHERAL NEUROPATHY (H): ICD-10-CM

## 2019-04-24 PROCEDURE — 11042 DBRDMT SUBQ TIS 1ST 20SQCM/<: CPT | Performed by: PODIATRIST

## 2019-04-24 PROCEDURE — 99203 OFFICE O/P NEW LOW 30 MIN: CPT | Mod: 25 | Performed by: PODIATRIST

## 2019-04-24 PROCEDURE — 11721 DEBRIDE NAIL 6 OR MORE: CPT | Mod: 59 | Performed by: PODIATRIST

## 2019-04-24 ASSESSMENT — MIFFLIN-ST. JEOR: SCORE: 1712.19

## 2019-04-24 ASSESSMENT — PAIN SCALES - GENERAL: PAINLEVEL: NO PAIN (0)

## 2019-04-24 NOTE — PROGRESS NOTES
HPI:  Sana Ward is a 54 year old female who is seen in consultation at the request of Beck Martinez MD    Pt presents for eval of:   (Onset, Location, L/R, Character, Treatments, Injury if yes)    XR Right foot 4/4/2019    DM Type 2     Onset late March 2019, ulcer plantar Left heel. Presents today with well worn slip on shoes.  Onset drainage,     4/4/2019 started Cipro 500mg 1 tablet 2 times daily and Clindamycin 300mg 1 capsule 3 times daily for 10 days - completed, no longer drainage    Not working. Active.    Weight management plan: Patient was referred to their PCP to discuss a diet and exercise plan.       Review of Systems:  Patient denies fever, chills, rash, wound, stiffness, limping, numbness, weakness, heart burn, blood in stool, chest pain with activity, calf pain when walking, shortness of breath with activity, chronic cough, easy bleeding/bruising, swelling of ankles, excessive thirst, fatigue, depression, anxiety.  Patient admits only to symptoms noted in history.     Patient Active Problem List   Diagnosis     Hypertension goal BP (blood pressure) < 130/80     Hyperlipidemia LDL goal <100     Mental health disorder     Advanced directives, counseling/discussion     Generalized anxiety disorder     Type 2 diabetes mellitus without complication (H)     Hypothyroidism due to acquired atrophy of thyroid     Status post ileostomy (H)     Obesity, Class III, BMI 40-49.9 (morbid obesity) (H)     Methamphetamine addiction (H)     Acute exacerbation of chronic paranoid schizophrenia (H)     Paranoid schizophrenia, chronic condition with acute exacerbation (H)     Psychosis (H)     Type 2 diabetes, controlled, with peripheral neuropathy (H)     PAST MEDICAL HISTORY:   Past Medical History:   Diagnosis Date     Delirium 11/19/11    D/C 11/30/11-secondary to UTI     Diabetes mellitus, type 2 (H) 6/15/2012     Diabetic eye exam (H) 5/12/14     Generalised anxiety disorder 6/15/2012     Hyperthyroidism   "    Obesity, Class III, BMI 40-49.9 (morbid obesity) (H) 10/28/2015     Post traumatic stress disorder      PAST SURGICAL HISTORY:   Past Surgical History:   Procedure Laterality Date     COLECTOMY  9/11/2001    total colectomy due to Crohn's     GI SURGERY  2003    has ostomy bag      HERNIA REPAIR      over 20 years ago     MEDICATIONS:   Current Outpatient Medications:      CARAFATE 1 GM/10ML suspension, APPLY A SMALL AMOUNT TO PARA STOMA 1-2 TIMES DAILY **NEEDS OFFICE VISIT FOR ANY FURTHER REFILLS**, Disp: 100 mL, Rfl: 2     gabapentin (NEURONTIN) 100 MG capsule, Take 100 mg capsule in Morning and 200 mg at bedtime., Disp: 180 capsule, Rfl: 1     haloperidol (HALDOL) 1 MG tablet, Take 1 mg by mouth as needed As needed for hallucinations, voices, Disp: , Rfl:      levothyroxine (SYNTHROID/LEVOTHROID) 75 MCG tablet, Take 1 tablet (75 mcg) by mouth daily, Disp: 30 tablet, Rfl: 3     LORazepam (ATIVAN) 0.25 MG TABS, Take 0.25 mg by mouth At Bedtime. , Disp: 20 tablet, Rfl: 0     losartan (COZAAR) 50 MG tablet, Take 1 tablet (50 mg) by mouth daily, Disp: 90 tablet, Rfl: 3     metFORMIN (GLUCOPHAGE) 500 MG tablet, TAKE 1 TABLET BY MOUTH TWICE A DAY WITH MEALS, Disp: 180 tablet, Rfl: 3     order for DME, Equipment being ordered: Ostomy Supplies( Premier Drainable Pouch 1.125\"), Disp: 1 Box, Rfl: 11     order for DME, Equipment being ordered: Premier Drainable Pouch Misc  Ostomy Supplies, Disp: 1 Box, Rfl: 3     order for DME, daily Premier Drain Pouch 1.125\" to be used every day as directed., Disp: 90 each, Rfl: 3     ORDER FOR DME, Equipment being ordered: ostomy supplies Premier Drainable Pouches MISC., Disp: 20 each, Rfl: 6     ORDER FOR DME, [employer] ostomy bags to be used as directed., Disp: 1 Box, Rfl: 6     ORDER FOR DME, [employer] Cohesive seals to be used every day as directed., Disp: 1 Box, Rfl: 3     Ostomy Supplies (ADAPT BARRIER RING 1-3/16\") MISC, USE EVERY DAY AS DIRECTED, Disp: 10 each, Rfl: 11     Ostomy " Supplies (ADAPT) PSTE, APPLY TO AFFECTED AREA AS DIRECTED, Disp: 60 each, Rfl: 11     Ostomy Supplies (PREMIER DRAINABLE) Pouch MISC, USE EVERY DAY AS DIRECTED, Disp: 20 each, Rfl: 11     Ostomy Supplies (SKIN GEL PROTECT DRESSING WIPE) PADS, USE EVERY DAY AS DIRECTED, Disp: 50 each, Rfl: 3     risperiDONE (RISPERDAL) 1 MG tablet, Take 1 mg by mouth every morning, Disp: , Rfl: 5     risperiDONE (RISPERDAL) 3 MG tablet, Take 3.0 mg by mouth At Bedtime., Disp: 30 tablet, Rfl: 1     sertraline (ZOLOFT) 100 MG tablet, Take 1 tablet (100 mg) by mouth daily, Disp: 90 tablet, Rfl: 1     simvastatin (ZOCOR) 20 MG tablet, Take 1 tablet (20 mg) by mouth At Bedtime, Disp: 90 tablet, Rfl: 3     Skin Protectants, Misc. (NO STING BARRIER FILM) MISC, USE EVERY DAY AS DIRECTED, Disp: 25 each, Rfl: 3     traZODone (DESYREL) 100 MG tablet, Take 1 tablet (100 mg) by mouth At Bedtime, Disp: 90 tablet, Rfl: 1  ALLERGIES:    Allergies   Allergen Reactions     Amoxicillin      Contrast Dye      Sulfa Drugs Hives     SOCIAL HISTORY:   Social History     Socioeconomic History     Marital status:      Spouse name: Not on file     Number of children: Not on file     Years of education: Not on file     Highest education level: Not on file   Occupational History     Not on file   Social Needs     Financial resource strain: Not on file     Food insecurity:     Worry: Not on file     Inability: Not on file     Transportation needs:     Medical: Not on file     Non-medical: Not on file   Tobacco Use     Smoking status: Never Smoker     Smokeless tobacco: Never Used   Substance and Sexual Activity     Alcohol use: No     Drug use: No     Sexual activity: Yes     Partners: Male   Lifestyle     Physical activity:     Days per week: Not on file     Minutes per session: Not on file     Stress: Not on file   Relationships     Social connections:     Talks on phone: Not on file     Gets together: Not on file     Attends Rastafari service: Not on  "file     Active member of club or organization: Not on file     Attends meetings of clubs or organizations: Not on file     Relationship status: Not on file     Intimate partner violence:     Fear of current or ex partner: Not on file     Emotionally abused: Not on file     Physically abused: Not on file     Forced sexual activity: Not on file   Other Topics Concern     Parent/sibling w/ CABG, MI or angioplasty before 65F 55M? Yes   Social History Narrative     Not on file     FAMILY HISTORY:   Family History   Problem Relation Age of Onset     Diabetes Maternal Grandmother      Diabetes Maternal Grandfather      EXAM:Vitals: /78   Temp 96.5  F (35.8  C) (Temporal)   Ht 1.6 m (5' 3\")   Wt 114.3 kg (252 lb)   BMI 44.64 kg/m    BMI= Body mass index is 44.64 kg/m .    General appearance: Patient is alert and fully cooperative with history & exam.  No sign of distress is noted during the visit.     Psychiatric: Affect is pleasant & appropriate.  Patient appears motivated to improve health.     Respiratory: Breathing is regular & unlabored while sitting.     HEENT: Hearing is intact to spoken word.  Speech is clear.  No gross evidence of visual impairment that would impact ambulation.     Vascular: DP 1/4 & PT 1/4 left & right.  CFT about 3 seconds with mild dependent rubor about the digits.  Diminished hair growth distal to mid tibia and no hair about the foot and toes.  Temperature changes noted, warm to cool proximal to distal.  Mild or minimal hemosiderin pigmentation noted with mild minimal varicosities legs and feet bilateral. Generalized edema bilateral legs and feet.  Pt denies claudication history.     Neurologic: Normal plantar response bilateral.  Diminished protective threshold plus 6/10 applications of a 5.07 monofilament.  Pt admits no burning and paraesthesias about the feet and toes with palpation.  She also admits no pain or discomfort with the ulcer in her heel right now.     Dermatologic: All " 10 nails are thickened, elongated, discolored and painful with palpation and debridement.  Diminished texture turgor and tone about the integument.  Skin is thin & shiny in places.  Full-thickness ulcer noted about the plantar lateral right calcaneus.  Some hyperkeratosis and sloughing skin noted from previous edema however this is not probing deeply to bone at this time.  No undermining after debridement.  No deep abscess noted.     Musculoskeletal: Patient is ambulatory without assistive device or brace.  Patient is obese.  Current shoe gear is completely crushed and inverting her foot by nearly 30 degrees likely causing the ulceration.  There is semi reducible contracture of the lesser digits.    Hemoglobin A1C (%)   Date Value   12/13/2018 6.1 (H)   09/25/2017 5.9   03/24/2017 5.8   03/03/2016 6.0   11/25/2014 5.9   05/01/2014 6.0   04/19/2013 5.9   09/04/2012 6.0     Creatinine (mg/dL)   Date Value   07/12/2018 0.87   09/25/2017 0.71   03/24/2017 0.76   03/03/2016 0.78   02/23/2015 0.65   11/25/2014 0.75          ASSESSMENT:       ICD-10-CM    1. Diabetes mellitus due to underlying condition with foot ulcer (CODE) (H) E08.621    2. Type 2 diabetes, controlled, with peripheral neuropathy (H) E11.42    3. Onychodystrophy L60.3    4. Xerosis of skin L85.3         PLAN:    4/24/2019  All 10 nails were debrided with a nail nipper.   I debrided the ulceration full thickness to a bleeding base and a sterile dressing applied.  May return to a Band-Aid or return to socks as soon as is dry.  Order for diabetic shoes  We discussed risk factors and preventive measures.    We discussed appropriate hygiene, shoe gear, daily foot exam, and reinforced management of weight, diet, activity goals and HA1C goal for diabetic patients.    Dispensed written foot care instructions.    All questions were answered to their satisfaction.    RTC once yearly for diabetic foot exam  RTC in about 5 or 6 weeks to confirm this ulceration  closes.  Recommend normal bathing but avoid soaking with the wound  Also recommend petroleum-based cream applied to both feet daily to reduce dry scaly fissured skin.      Zhang Kennedy DPM

## 2019-04-24 NOTE — PATIENT INSTRUCTIONS
"DIABETES AND YOUR FEET    What effect does diabetes have on the feet?  Diabetes can result in several problems in the feet including contractures of the tendons leading to deformities and reduced function of the bones, skin ulcers or open sores on pressure points or prominent deformities, reduced sensation, reduced blood flow and thus reduced oxygen and immune cells to the tiny vessels in our feet. This all leads to higher risk of hospitalization, infections, and amputations.     What is neuropathy?  Neuropathy is a term used to describe a loss of nerve function.  Patients with diabetes are at risk of developing neuropathy if their sugars continue to run high and are above the normal value of 140.  The elevated blood sugar in the body enters the nerves causing it to swell and impair nerve function.  The higher the blood sugar and the longer it is elevated, the more damage is done to nerves.  This damage is permanent and irreversible.  These damaged sensory nerves can then cause reduced feeling or cause pain.  Damaged motor nerves can reduce blood flow and white blood cells into into your foot, skin and bones reducing your ability to heal a small problem. And neuropathy can cause tendons to become unbalanced and contribute to the formation of deformity and contractures in our feet. Often times, neuropathy can be prevented by controlling your blood sugar.  Your risk of developing neuropathy goes up dramatically as your hemoglobin A1C raises above 7.5.      How do I know if I have neuropathy?  When a person develops neuropathy, they usually begin to feel numbness or tingling in their feet and sometimes in their legs.  Other symptoms may include painful burning or hot feet, tingling, electrical sensations or feeling like insects or ants are crawling on your feet or legs.  If blood sugar remains above 140  for long periods of time, neuropathy can also occur in the hands.  When a person loses their \"protective threshold\" " or ability to detect a 5.07 [address] Alejandro monofilament is when they have elevated risk for developing foot deformity, contractures, foot infections, amputations, Charcot arthropathy, or other complications. Keep your hemoglobin A1C below 7.5 to reduce this risk.    What is vascular disease?  Peripheral vascular disease is a term used to describe a loss or decrease in circulation (blood flow).  There is a problem in getting blood, immune cells, and oxygen to areas that need it.  Similar to neuropathy, sugars can build up in the walls of the arteries (blood vessels) and cause them to become swollen, thickened and hardened.  This decreases the amount of blood that can go to an area that needs it.  Though this is common in the legs of diabetic patients, it can also affect other arteries (blood vessels) in the body such as in the heart, kidney, eyes, and the blood flow into bones.  It is often seen first in the small vessels of her body notably our feet and toes.    How do I know if I have vascular disease?  In the legs, vascular disease usually results in cramping.  Patients who develop leg cramps after walking the same distance every time (i.e. One block, half a mile, ect.) need to let their doctors know so that their circulation may be checked.  Cramps causing severe pain in the feet and/or legs while sleeping and the cramps go away when you stand or hang your legs off the side of the bed, may also be a sign of poor blood circulation.  Occasional cramping in cold weather or on rare occasions with activity may not be due to poor circulation, but you should inform your doctor.    How can these problems be prevented?  The key to prevention is good blood sugar control all day every day.  Inadequate blood sugar control is the most common way patients experience these problems. Reducing, controlling and measuring your daily consumption of sugar or carbohydrates is essential to understanding and managing diabetes.   Physical activity (exercise) is a very good way to help decrease your blood sugars.  Exercise can lower your blood sugar, blood pressure, and cholesterol.  It also reduces your risk for heart disease and stroke, relieves stress, and strengthens your heart, muscles and bones. Physical activity also increases your balance and reduces development of contractures and foot deformities over time. In addition, regular activity helps insulin work better, improves your blood circulation, and keeps your joints flexible.  If you're trying to lose weight, a combination of exercise and wise food choices can help you reach your target weight and maintain it.  Activity and exercise alone can not make up for poor diet choices, eating too much, or eating too many sugars or carbohydrates.  Ask your doctor for help when you are not meeting your blood sugar goals. Changes or increases in medication are powerful tools in reducing your blood sugar.    Know your blood sugar and hemoglobin A1C trend.  Upon first diagnosis or during acute illness, checking your blood sugar 4 times a day can help you understand how your diet, activity, and lifestyle affect your blood sugar.  Monitoring your hemoglobin A1C can help you understand how well you are managing blood sugar over the long run.  Your hemoglobin A1C tells you what your blood sugar averages all day, every day, over the past 90 days.       To experience the lowest risk of complications associated with diabetes such as neuropathy, loss of blood flow, bone or joint infection, charcot arthropathy, or amputation, the American Diabetes Association recommends a target hemoglobin A1C of less than 7.0%, while the American Association of Clinical Endocrinologists' recommendation is 6.5% or less.  Both organizations advise that the goals be individualized based on patient factors such as other health conditions, history of hypoglycemia, education, and life expectancy.  A patients risk of  experiencing complications associated with diabetes is only slightly elevated with a hemoglobin A1C above 6.0.  However, this risk goes up exponentially when the hemoglobin A1C is above 7.5.  The longer the hemoglobin A1C is elevated, the more risk that patient will experience in their lifetime. The damage that occurs to nerves, blood vessels, tendons, bones and body organs, while their hemoglobin A1C is elevated is mostly irreversible and worsens with each additional time period of elevated hemoglobin A1C.     You must understand and manage your disease.  Your health insurance or medical team cannot manage this disease for you.  When you take responsibility for understanding and managing your disease, you can expect to experience fewer problems associated with diabetes in your lifetime.  You will  Also experience a higher quality of life and health and reduced cost of health care.    Diabetic Foot Care Recommendations  The following are recommendations for avoiding serious foot problems or injury    DO'S  1. Be aware of your hemoglobin A1C and continue to follow up with your medical team for adjustments in your lifestyle and medication until your reach your A1C goal.  Keep this below 7.5 to reduce your risk of developing complications associated with diabetes.    2.  Wash your feet with lukewarm water and a mild soap and then dry them thoroughly, especially between the toes.  Gently floss your towel or washcloth between each toe at every bath.  Soaking your feet in water cannot clean dead skin, debris, and bacteria from your feet and is not necessary.   3. Examine your feet daily looking for cuts, corns, blisters, cracks, ect..., especially after wearing new shoes or increased or changed activities.  Make sure to look between your toes.  If you cannot see the bottom of your feet, set a mirror on the floor and hold your foot over it, or ask a family member to examine your feet for you daily.  Contact your doctor  immediately if new problems are noted or if sores are not healing.  4.  Immediately apply moisturizer cream such as Cetaphil to the tops and bottoms of your feet, avoiding areas between the toes.  Apply sunscreen or cover your feet if they will be exposed to extended sunlight.  5.Use clean comfortable shoes.  Socks should not have thick seams or cut off the circulation around the leg.  Break in new shoes slowly and rotate with older shoes until broken in.  Check the inside of your shoes with your hand to look for areas of irritation or objects that may have fallen into your shoes.    6. Keep slippers by the side of your bed for use during the night.  7. Shoes should be fitted by a professional and should not cause areas of irritation.  Check your feet regularly when wearing a new pair of shoes and replace them as needed.  8.  Talk to your doctor about proper exercise.  Exercise and stretching stimulate blood flow to your feet and maintain proper glucose levels.  Use it or lose it!  9.  Monitor your blood glucose level and your hemoglobin A1C.  Notify your doctor immediately if your blood sugar is abnormally high or low.  10.  Cut your nails straight across, but then gently round any sharp edges with a nail file.  If you have neuropathy, peripheral vascular disease or cannot see that well to trim your own toenails, see a medical professional for care.    DONT'S  1.  Do not soak your feet if you have an open sore or your provider has informed you that you have neuropathy or loss of protective threshold.  Use only lukewarm water and always check the temperature with your hand as hot water can easily burn your feet.    2.  Never use a hot water bottle or heating pad on your feet.  Also do not apply hot or cold compresses to your feet.  With decreased sensation, you could burn or freeze your feet.  Do not rest your feet near a heat source such as a heater or heat register.    3.  Do not apply any of these to your  "feet:    - over the counter medicine for corns or warts    -  Harsh chemicals like boric acid    -  Do not self-treat corns, cuts, blisters or infections.  Always consult your doctor.   4.  Do not wear sandals, slippers or walk barefoot, especially on harsh surfaces.  5.  If you smoke, stop!!!    Nail Debridement    A high quality instrument makes trimming toenails MUCH easier.  Search [employer]bay for any 5\" nail nipper manufactured by reliable brands such as Miltex, Integra or Jarit as these quality instruments will help manage difficult nails more effectively and comfortably. We use Miltex -SS.  A physician is not necessary to trim nails even if you are taking blood thinners or are diabetic.  Your family or care givers may help manage your toenails.      Trim or sand the nails once weekly.  Do not wait until they are long and painful or trimming will become too difficult and painful and will increase your risk of complications or infection.  A course file or 120 grit sandpaper on a sanding block can be helpful.  For very thick nails many people prefer battery operated parra such as an Amope', Personal Pedi and Emjoi for regular use or heavy painful callouses or thick toenails.    Trim or skive any portion of nail that is thick, loose, crumbling, or not well attached. Do not tear the nail away, but rather cut them with a nail nipperor sand or sand them down.  You may follow up with your Podiatric Physician if you have pain, bleeding, infection, questions or other concerns.      You may also contact the following Registered Nurses for further help with nail debridement and minor hygiene concnerns.  They may come to your home or meet them at their clinic to trim your toenails and soak your feet, as well as monitor for any complications that would require evaluation by a Physician.      [facility]  Sherry Meyer, RN  Office [phone]    [facility]  Alix Parson, " RN  [phone]   Call or text for appointment  Some home visits and has a clinic at:  [address] [id number]  [place], [facility]  [address]  [phone]    [facility]  [phone]  [place], [place], [place]    [facility]  [phone]  [email or web address]    For up to date list and to find foot care nurses in other communities visit [employer] Association website:  afcna.org.     Calluses, Corns, IPKs, Porokeratosis    When there is excessive friction or pressure on the skin, the body responds by making the skin thicker.  While this may protect the deeper structures, the thickened skin can take up more space and thus increase pressure over a bony prominence or become an open sore or skin ulcer as this skin becomes less flexible.    Flat, diffuse thickening are simple calluses and they are usually caused by friction.  Often these are the result of rubbing on a shoe or going barefoot.    Calluses with a central core between the toes are called corns.  These often result from prominent joints on adjacent toes rubbing together.  Theses are often a symptom of bone malalignment and will usually recur unless the underlying bones are addressed.    Many of these lesions can be kept comfortable with routine maintenance. This consists of filing them with a Ped Egg, callus file, or 120 grit sandpaper on a block, every day during your bath or shower.  Most people prefer battery operated parra such as an Amope', Personal Pedi and Emjoi for regular use or heavy painful callouses.  Heavy creams or ointments can be applied 1-2 times every day to keep them soft. Toe spacers can be used for corns, gel pads can be used for other lesions on the bottom of the foot. If there is a deformity noted, such as a prominent bone, often this can be addressed to minimize recurrence. However, sometimes the pressure and  lesion simply migrates to another spot after surgery, so it is not a guaranteed cure.     If you have severe callouses and cracking, you may apply heavy ointments that you scoop up such as Cetaphil cream, Eucerin, Aquaphor or Vaseline.  Be sure to obtain cream or ointment in these brands and not lotion (lotion is water based and not durable enough for feet). For more aggressive help apply heavy creams or ointment under occlusive dressings such as Saran Wrap or Jelly Feet while sleeping.   Jelly Feet can be obtained at [email or web address].     To be successful with managing hyperkeratotic skin, you must manage hygiene daily.  Apply the cream once or twice EVERY day.  At your bath or shower time is the easiest time to work on this when skin is most soft.  There is no medical or surgical treatment that will absolutely eliminate many of these symptoms.      [employer] is a reliable source for all sorts of foot pads, cushions, or interdigital spacers and foot appliances. Go to www.[employer]or[employer] or request a catalog at 4-200-2houses.        Please call with any additional questions.

## 2019-04-24 NOTE — LETTER
4/24/2019         RE: Sana Ward  [address]  [facility] [address]        Dear Colleague,    Thank you for referring your patient, Sana Ward, to the [facility]. Please see a copy of my visit note below.    HPI:  Sana Ward is a 54 year old female who is seen in consultation at the request of Beck Martinez MD    Pt presents for eval of:   (Onset, Location, L/R, Character, Treatments, Injury if yes)    XR Right foot 4/4/2019    DM Type 2     Onset late March 2019, ulcer plantar Left heel. Presents today with well worn slip on shoes.  Onset drainage,     4/4/2019 started Cipro 500mg 1 tablet 2 times daily and Clindamycin 300mg 1 capsule 3 times daily for 10 days - completed, no longer drainage    Not working. Active.    Weight management plan: Patient was referred to their PCP to discuss a diet and exercise plan.       Review of Systems:  Patient denies fever, chills, rash, wound, stiffness, limping, numbness, weakness, heart burn, blood in stool, chest pain with activity, calf pain when walking, shortness of breath with activity, chronic cough, easy bleeding/bruising, swelling of ankles, excessive thirst, fatigue, depression, anxiety.  Patient admits only to symptoms noted in history.     Patient Active Problem List   Diagnosis     Hypertension goal BP (blood pressure) < 130/80     Hyperlipidemia LDL goal <100     Mental health disorder     Advanced directives, counseling/discussion     Generalized anxiety disorder     Type 2 diabetes mellitus without complication (H)     Hypothyroidism due to acquired atrophy of thyroid     Status post ileostomy (H)     Obesity, Class III, BMI 40-49.9 (morbid obesity) (H)     Methamphetamine addiction (H)     Acute exacerbation of chronic paranoid schizophrenia (H)     Paranoid schizophrenia, chronic condition with acute exacerbation (H)     Psychosis (H)     Type 2 diabetes, controlled, with peripheral neuropathy (H)     PAST MEDICAL  "HISTORY:   Past Medical History:   Diagnosis Date     Delirium 11/19/11    D/C 11/30/11-secondary to UTI     Diabetes mellitus, type 2 (H) 6/15/2012     Diabetic eye exam (H) 5/12/14     Generalised anxiety disorder 6/15/2012     Hyperthyroidism      Obesity, Class III, BMI 40-49.9 (morbid obesity) (H) 10/28/2015     Post traumatic stress disorder      PAST SURGICAL HISTORY:   Past Surgical History:   Procedure Laterality Date     COLECTOMY  9/11/2001    total colectomy due to Crohn's     GI SURGERY  2003    has ostomy bag      HERNIA REPAIR      over 20 years ago     MEDICATIONS:   Current Outpatient Medications:      CARAFATE 1 GM/10ML suspension, APPLY A SMALL AMOUNT TO PARA STOMA 1-2 TIMES DAILY **NEEDS OFFICE VISIT FOR ANY FURTHER REFILLS**, Disp: 100 mL, Rfl: 2     gabapentin (NEURONTIN) 100 MG capsule, Take 100 mg capsule in Morning and 200 mg at bedtime., Disp: 180 capsule, Rfl: 1     haloperidol (HALDOL) 1 MG tablet, Take 1 mg by mouth as needed As needed for hallucinations, voices, Disp: , Rfl:      levothyroxine (SYNTHROID/LEVOTHROID) 75 MCG tablet, Take 1 tablet (75 mcg) by mouth daily, Disp: 30 tablet, Rfl: 3     LORazepam (ATIVAN) 0.25 MG TABS, Take 0.25 mg by mouth At Bedtime. , Disp: 20 tablet, Rfl: 0     losartan (COZAAR) 50 MG tablet, Take 1 tablet (50 mg) by mouth daily, Disp: 90 tablet, Rfl: 3     metFORMIN (GLUCOPHAGE) 500 MG tablet, TAKE 1 TABLET BY MOUTH TWICE A DAY WITH MEALS, Disp: 180 tablet, Rfl: 3     order for DME, Equipment being ordered: Ostomy Supplies( Premier Drainable Pouch 1.125\"), Disp: 1 Box, Rfl: 11     order for DME, Equipment being ordered: Premier Drainable Pouch Misc  Ostomy Supplies, Disp: 1 Box, Rfl: 3     order for DME, daily Premier Drain Pouch 1.125\" to be used every day as directed., Disp: 90 each, Rfl: 3     ORDER FOR DME, Equipment being ordered: ostomy supplies Premier Drainable Pouches MISC., Disp: 20 each, Rfl: 6     ORDER FOR DME, [place] ostomy bags to be " "used as directed., Disp: 1 Box, Rfl: 6     ORDER FOR DME, [place] Cohesive seals to be used every day as directed., Disp: 1 Box, Rfl: 3     Ostomy Supplies (ADAPT BARRIER RING 1-3/16\") MISC, USE EVERY DAY AS DIRECTED, Disp: 10 each, Rfl: 11     Ostomy Supplies (ADAPT) PSTE, APPLY TO AFFECTED AREA AS DIRECTED, Disp: 60 each, Rfl: 11     Ostomy Supplies (PREMIER DRAINABLE) Pouch MISC, USE EVERY DAY AS DIRECTED, Disp: 20 each, Rfl: 11     Ostomy Supplies (SKIN GEL PROTECT DRESSING WIPE) PADS, USE EVERY DAY AS DIRECTED, Disp: 50 each, Rfl: 3     risperiDONE (RISPERDAL) 1 MG tablet, Take 1 mg by mouth every morning, Disp: , Rfl: 5     risperiDONE (RISPERDAL) 3 MG tablet, Take 3.0 mg by mouth At Bedtime., Disp: 30 tablet, Rfl: 1     sertraline (ZOLOFT) 100 MG tablet, Take 1 tablet (100 mg) by mouth daily, Disp: 90 tablet, Rfl: 1     simvastatin (ZOCOR) 20 MG tablet, Take 1 tablet (20 mg) by mouth At Bedtime, Disp: 90 tablet, Rfl: 3     Skin Protectants, Misc. (NO STING BARRIER FILM) MISC, USE EVERY DAY AS DIRECTED, Disp: 25 each, Rfl: 3     traZODone (DESYREL) 100 MG tablet, Take 1 tablet (100 mg) by mouth At Bedtime, Disp: 90 tablet, Rfl: 1  ALLERGIES:    Allergies   Allergen Reactions     Amoxicillin      Contrast Dye      Sulfa Drugs Hives     SOCIAL HISTORY:   Social History     Socioeconomic History     Marital status:      Spouse name: Not on file     Number of children: Not on file     Years of education: Not on file     Highest education level: Not on file   Occupational History     Not on file   Social Needs     Financial resource strain: Not on file     Food insecurity:     Worry: Not on file     Inability: Not on file     Transportation needs:     Medical: Not on file     Non-medical: Not on file   Tobacco Use     Smoking status: Never Smoker     Smokeless tobacco: Never Used   Substance and Sexual Activity     Alcohol use: No     Drug use: No     Sexual activity: Yes     Partners: Male   Lifestyle     " "Physical activity:     Days per week: Not on file     Minutes per session: Not on file     Stress: Not on file   Relationships     Social connections:     Talks on phone: Not on file     Gets together: Not on file     Attends Confucianism service: Not on file     Active member of club or organization: Not on file     Attends meetings of clubs or organizations: Not on file     Relationship status: Not on file     Intimate partner violence:     Fear of current or ex partner: Not on file     Emotionally abused: Not on file     Physically abused: Not on file     Forced sexual activity: Not on file   Other Topics Concern     Parent/sibling w/ CABG, MI or angioplasty before 65F 55M? Yes   Social History Narrative     Not on file     FAMILY HISTORY:   Family History   Problem Relation Age of Onset     Diabetes Maternal Grandmother      Diabetes Maternal Grandfather      EXAM:Vitals: /78   Temp 96.5  F (35.8  C) (Temporal)   Ht 1.6 m (5' 3\")   Wt 114.3 kg (252 lb)   BMI 44.64 kg/m     BMI= Body mass index is 44.64 kg/m .    General appearance: Patient is alert and fully cooperative with history & exam.  No sign of distress is noted during the visit.     Psychiatric: Affect is pleasant & appropriate.  Patient appears motivated to improve health.     Respiratory: Breathing is regular & unlabored while sitting.     HEENT: Hearing is intact to spoken word.  Speech is clear.  No gross evidence of visual impairment that would impact ambulation.     Vascular: DP 1/4 & PT 1/4 left & right.  CFT about 3 seconds with mild dependent rubor about the digits.  Diminished hair growth distal to mid tibia and no hair about the foot and toes.  Temperature changes noted, warm to cool proximal to distal.  Mild or minimal hemosiderin pigmentation noted with mild minimal varicosities legs and feet bilateral. Generalized edema bilateral legs and feet.  Pt denies claudication history.     Neurologic: Normal plantar response bilateral.  " Diminished protective threshold plus 6/10 applications of a 5.07 monofilament.  Pt admits no burning and paraesthesias about the feet and toes with palpation.  She also admits no pain or discomfort with the ulcer in her heel right now.     Dermatologic: All 10 nails are thickened, elongated, discolored and painful with palpation and debridement.  Diminished texture turgor and tone about the integument.  Skin is thin & shiny in places.  Full-thickness ulcer noted about the plantar lateral right calcaneus.  Some hyperkeratosis and sloughing skin noted from previous edema however this is not probing deeply to bone at this time.  No undermining after debridement.  No deep abscess noted.     Musculoskeletal: Patient is ambulatory without assistive device or brace.  Patient is obese.  Current shoe gear is completely crushed and inverting her foot by nearly 30 degrees likely causing the ulceration.  There is semi reducible contracture of the lesser digits.    Hemoglobin A1C (%)   Date Value   12/13/2018 6.1 (H)   09/25/2017 5.9   03/24/2017 5.8   03/03/2016 6.0   11/25/2014 5.9   05/01/2014 6.0   04/19/2013 5.9   09/04/2012 6.0     Creatinine (mg/dL)   Date Value   07/12/2018 0.87   09/25/2017 0.71   03/24/2017 0.76   03/03/2016 0.78   02/23/2015 0.65   11/25/2014 0.75          ASSESSMENT:       ICD-10-CM    1. Diabetes mellitus due to underlying condition with foot ulcer (CODE) (H) E08.621    2. Type 2 diabetes, controlled, with peripheral neuropathy (H) E11.42    3. Onychodystrophy L60.3    4. Xerosis of skin L85.3         PLAN:    4/24/2019  All 10 nails were debrided with a nail nipper.   I debrided the ulceration full thickness to a bleeding base and a sterile dressing applied.  May return to a Band-Aid or return to socks as soon as is dry.  Order for diabetic shoes  We discussed risk factors and preventive measures.    We discussed appropriate hygiene, shoe gear, daily foot exam, and reinforced management of weight,  diet, activity goals and HA1C goal for diabetic patients.    Dispensed written foot care instructions.    All questions were answered to their satisfaction.    RTC once yearly for diabetic foot exam  RTC in about 5 or 6 weeks to confirm this ulceration closes.  Recommend normal bathing but avoid soaking with the wound  Also recommend petroleum-based cream applied to both feet daily to reduce dry scaly fissured skin.      Zhang Kennedy DPM      Again, thank you for allowing me to participate in the care of your patient.        Sincerely,        Zhang Kennedy DPM

## 2019-05-21 DIAGNOSIS — E03.4 HYPOTHYROIDISM DUE TO ACQUIRED ATROPHY OF THYROID: ICD-10-CM

## 2019-05-22 RX ORDER — LEVOTHYROXINE SODIUM 75 UG/1
75 TABLET ORAL DAILY
Qty: 30 TABLET | Refills: 3 | Status: SHIPPED | OUTPATIENT
Start: 2019-05-22 | End: 2019-09-06

## 2019-05-25 DIAGNOSIS — Z93.2 STATUS POST ILEOSTOMY (H): ICD-10-CM

## 2019-05-28 RX ORDER — OSTOMY SUPPLY
PASTE (GRAM) MISCELLANEOUS
Qty: 1 EACH | Refills: 3 | Status: SHIPPED | OUTPATIENT
Start: 2019-05-28 | End: 2020-03-12

## 2019-05-28 NOTE — TELEPHONE ENCOUNTER
Requested Prescriptions   Pending Prescriptions Disp Refills     Ostomy Supplies (ADAPT) PSTE [Pharmacy Med Name: ADAPT PASTE]       Sig: APPLY TO AFFECTED AREA AS DIRECTED       There is no refill protocol information for this order        Last Written Prescription Date:  5/16/2018  Last Fill Quantity: 60,  # refills: 11   Last office visit: 4/4/2019 with prescribing provider:     Future Office Visit:   Next 5 appointments (look out 90 days)    Jun 05, 2019  8:30 AM CDT  Return Visit with Zhang Kennedy DPM  [facility]) [address] [facility] [address]  [phone]   Jun 14, 2019 10:40 AM CDT  Office Visit with Beck Martinez MD  [facility]) [address] [place] [id number] [phone]         Routing refill request to provider for review/approval because:  Drug not on the [facility] refill protocol     Dolores Sheriff RN

## 2019-06-05 ENCOUNTER — OFFICE VISIT (OUTPATIENT)
Dept: PODIATRY | Facility: OTHER | Age: 54
End: 2019-06-05
Payer: MEDICARE

## 2019-06-05 VITALS
WEIGHT: 247 LBS | DIASTOLIC BLOOD PRESSURE: 72 MMHG | BODY MASS INDEX: 43.77 KG/M2 | SYSTOLIC BLOOD PRESSURE: 120 MMHG | HEIGHT: 63 IN | TEMPERATURE: 96.9 F

## 2019-06-05 DIAGNOSIS — L85.3 XEROSIS OF SKIN: ICD-10-CM

## 2019-06-05 DIAGNOSIS — E11.42 TYPE 2 DIABETES, CONTROLLED, WITH PERIPHERAL NEUROPATHY (H): Primary | ICD-10-CM

## 2019-06-05 DIAGNOSIS — E08.621 DIABETES MELLITUS DUE TO UNDERLYING CONDITION WITH FOOT ULCER (CODE) (H): ICD-10-CM

## 2019-06-05 PROCEDURE — 97597 DBRDMT OPN WND 1ST 20 CM/<: CPT | Performed by: PODIATRIST

## 2019-06-05 ASSESSMENT — PAIN SCALES - GENERAL: PAINLEVEL: NO PAIN (0)

## 2019-06-05 ASSESSMENT — MIFFLIN-ST. JEOR: SCORE: 1689.51

## 2019-06-05 NOTE — PATIENT INSTRUCTIONS
"DIABETES AND YOUR FEET    What effect does diabetes have on the feet?  Diabetes can result in several problems in the feet including contractures of the tendons leading to deformities and reduced function of the bones, skin ulcers or open sores on pressure points or prominent deformities, reduced sensation, reduced blood flow and thus reduced oxygen and immune cells to the tiny vessels in our feet. This all leads to higher risk of hospitalization, infections, and amputations.     What is neuropathy?  Neuropathy is a term used to describe a loss of nerve function.  Patients with diabetes are at risk of developing neuropathy if their sugars continue to run high and are above the normal value of 140.  The elevated blood sugar in the body enters the nerves causing it to swell and impair nerve function.  The higher the blood sugar and the longer it is elevated, the more damage is done to nerves.  This damage is permanent and irreversible.  These damaged sensory nerves can then cause reduced feeling or cause pain.  Damaged motor nerves can reduce blood flow and white blood cells into into your foot, skin and bones reducing your ability to heal a small problem. And neuropathy can cause tendons to become unbalanced and contribute to the formation of deformity and contractures in our feet. Often times, neuropathy can be prevented by controlling your blood sugar.  Your risk of developing neuropathy goes up dramatically as your hemoglobin A1C raises above 7.5.      How do I know if I have neuropathy?  When a person develops neuropathy, they usually begin to feel numbness or tingling in their feet and sometimes in their legs.  Other symptoms may include painful burning or hot feet, tingling, electrical sensations or feeling like insects or ants are crawling on your feet or legs.  If blood sugar remains above 140  for long periods of time, neuropathy can also occur in the hands.  When a person loses their \"protective threshold\" " or ability to detect a 5.07 [place] monofilament is when they have elevated risk for developing foot deformity, contractures, foot infections, amputations, Charcot arthropathy, or other complications. Keep your hemoglobin A1C below 7.5 to reduce this risk.    What is vascular disease?  Peripheral vascular disease is a term used to describe a loss or decrease in circulation (blood flow).  There is a problem in getting blood, immune cells, and oxygen to areas that need it.  Similar to neuropathy, sugars can build up in the walls of the arteries (blood vessels) and cause them to become swollen, thickened and hardened.  This decreases the amount of blood that can go to an area that needs it.  Though this is common in the legs of diabetic patients, it can also affect other arteries (blood vessels) in the body such as in the heart, kidney, eyes, and the blood flow into bones.  It is often seen first in the small vessels of her body notably our feet and toes.    How do I know if I have vascular disease?  In the legs, vascular disease usually results in cramping.  Patients who develop leg cramps after walking the same distance every time (i.e. One block, half a mile, ect.) need to let their doctors know so that their circulation may be checked.  Cramps causing severe pain in the feet and/or legs while sleeping and the cramps go away when you stand or hang your legs off the side of the bed, may also be a sign of poor blood circulation.  Occasional cramping in cold weather or on rare occasions with activity may not be due to poor circulation, but you should inform your doctor.    How can these problems be prevented?  The key to prevention is good blood sugar control all day every day.  Inadequate blood sugar control is the most common way patients experience these problems. Reducing, controlling and measuring your daily consumption of sugar or carbohydrates is essential to understanding and managing diabetes.   Physical activity (exercise) is a very good way to help decrease your blood sugars.  Exercise can lower your blood sugar, blood pressure, and cholesterol.  It also reduces your risk for heart disease and stroke, relieves stress, and strengthens your heart, muscles and bones. Physical activity also increases your balance and reduces development of contractures and foot deformities over time. In addition, regular activity helps insulin work better, improves your blood circulation, and keeps your joints flexible.  If you're trying to lose weight, a combination of exercise and wise food choices can help you reach your target weight and maintain it.  Activity and exercise alone can not make up for poor diet choices, eating too much, or eating too many sugars or carbohydrates.  Ask your doctor for help when you are not meeting your blood sugar goals. Changes or increases in medication are powerful tools in reducing your blood sugar.    Know your blood sugar and hemoglobin A1C trend.  Upon first diagnosis or during acute illness, checking your blood sugar 4 times a day can help you understand how your diet, activity, and lifestyle affect your blood sugar.  Monitoring your hemoglobin A1C can help you understand how well you are managing blood sugar over the long run.  Your hemoglobin A1C tells you what your blood sugar averages all day, every day, over the past 90 days.       To experience the lowest risk of complications associated with diabetes such as neuropathy, loss of blood flow, bone or joint infection, charcot arthropathy, or amputation, the American Diabetes Association recommends a target hemoglobin A1C of less than 7.0%, while the American Association of Clinical Endocrinologists' recommendation is 6.5% or less.  Both organizations advise that the goals be individualized based on patient factors such as other health conditions, history of hypoglycemia, education, and life expectancy.  A patients risk of  experiencing complications associated with diabetes is only slightly elevated with a hemoglobin A1C above 6.0.  However, this risk goes up exponentially when the hemoglobin A1C is above 7.5.  The longer the hemoglobin A1C is elevated, the more risk that patient will experience in their lifetime. The damage that occurs to nerves, blood vessels, tendons, bones and body organs, while their hemoglobin A1C is elevated is mostly irreversible and worsens with each additional time period of elevated hemoglobin A1C.     You must understand and manage your disease.  Your health insurance or medical team cannot manage this disease for you.  When you take responsibility for understanding and managing your disease, you can expect to experience fewer problems associated with diabetes in your lifetime.  You will  Also experience a higher quality of life and health and reduced cost of health care.              Diabetic Foot Care Recommendations  The following are recommendations for avoiding serious foot problems or injury    DO'S  1. Be aware of your hemoglobin A1C and continue to follow up with your medical team for adjustments in your lifestyle and medication until your reach your A1C goal.  Keep this below 7.5 to reduce your risk of developing complications associated with diabetes.    2.  Wash your feet with lukewarm water and a mild soap and then dry them thoroughly, especially between the toes.  Gently floss your towel or washcloth between each toe at every bath.  Soaking your feet in water cannot clean dead skin, debris, and bacteria from your feet and is not necessary.   3. Examine your feet daily looking for cuts, corns, blisters, cracks, ect..., especially after wearing new shoes or increased or changed activities.  Make sure to look between your toes.  If you cannot see the bottom of your feet, set a mirror on the floor and hold your foot over it, or ask a family member to examine your feet for you daily.  Contact your  doctor immediately if new problems are noted or if sores are not healing.  4.  Immediately apply moisturizer cream such as Cetaphil to the tops and bottoms of your feet, avoiding areas between the toes.  Apply sunscreen or cover your feet if they will be exposed to extended sunlight.  5.Use clean comfortable shoes.  Socks should not have thick seams or cut off the circulation around the leg.  Break in new shoes slowly and rotate with older shoes until broken in.  Check the inside of your shoes with your hand to look for areas of irritation or objects that may have fallen into your shoes.    6. Keep slippers by the side of your bed for use during the night.  7. Shoes should be fitted by a professional and should not cause areas of irritation.  Check your feet regularly when wearing a new pair of shoes and replace them as needed.  8.  Talk to your doctor about proper exercise.  Exercise and stretching stimulate blood flow to your feet and maintain proper glucose levels.  Use it or lose it!  9.  Monitor your blood glucose level and your hemoglobin A1C.  Notify your doctor immediately if your blood sugar is abnormally high or low.  10.  Cut your nails straight across, but then gently round any sharp edges with a nail file.  If you have neuropathy, peripheral vascular disease or cannot see that well to trim your own toenails, see a medical professional for care.    DONT'S  1.  Do not soak your feet if you have an open sore or your provider has informed you that you have neuropathy or loss of protective threshold.  Use only lukewarm water and always check the temperature with your hand as hot water can easily burn your feet.    2.  Never use a hot water bottle or heating pad on your feet.  Also do not apply hot or cold compresses to your feet.  With decreased sensation, you could burn or freeze your feet.  Do not rest your feet near a heat source such as a heater or heat register.    3.  Do not apply any of these to your  feet:    - over the counter medicine for corns or warts    -  Harsh chemicals like boric acid    -  Do not self-treat corns, cuts, blisters or infections.  Always consult your doctor.   4.  Do not wear sandals, slippers or walk barefoot, especially on harsh surfaces.  5.  If you smoke, stop!!!

## 2019-06-05 NOTE — PROGRESS NOTES
"Chief Complaint   Patient presents with     RECHECK     WOUND CARE     DM ulcer plantar lateral Right heel, onset late March 2019; LOV 4/24/2019     Diabetes     Type 2       Weight management plan: Patient was referred to their PCP to discuss a diet and exercise plan.     HPI:  DM Type 2  Onset late March 2019, ulcer plantar Left heel. Presents today with well worn slip on shoes.  Onset drainage,   4/4/2019 started Cipro 500mg 1 tablet 2 times daily and Clindamycin 300mg 1 capsule 3 times daily for 10 days - completed, no longer drainage    Since last visit the ulceration has become dry with eschar in the apply hydrating cream daily.    Not working. Active.    EXAM:Vitals: /72 (BP Location: Right arm, Cuff Size: Adult Large)   Temp 96.9  F (36.1  C) (Temporal)   Ht 1.6 m (5' 3\")   Wt 112 kg (247 lb)   BMI 43.75 kg/m    BMI= Body mass index is 43.75 kg/m .    General appearance: Patient is alert and fully cooperative with history & exam.  No sign of distress is noted during the visit.     Psychiatric: Affect is pleasant & appropriate.  Patient appears motivated to improve health.     Respiratory: Breathing is regular & unlabored while sitting.     HEENT: Hearing is intact to spoken word.  Speech is clear.  No gross evidence of visual impairment that would impact ambulation.     Vascular: DP 1/4 & PT 1/4 left & right.  CFT about 3 seconds with mild dependent rubor about the digits.  Diminished hair growth distal to mid tibia and no hair about the foot and toes.  Temperature changes noted, warm to cool proximal to distal.  Mild or minimal hemosiderin pigmentation noted with mild minimal varicosities legs and feet bilateral. Generalized edema bilateral legs and feet.  Pt denies claudication history.     Neurologic: Normal plantar response bilateral.  Diminished protective threshold plus 6/10 applications of a 5.07 monofilament.  Pt admits no burning and paraesthesias about the feet and toes with palpation.  " She also admits no pain or discomfort with the ulcer in her heel right now.     Dermatologic: All 10 nails are dystrophic but in reasonable repair diminished texture turgor and tone about the integument.  Skin is thin & shiny in places.  Ulceration plantar right heel has closed with some eschar throughout.  Overall both heels are fairly dry but this is improved with application of petroleum-based cream once daily.  Upon debridement of the eschar no full-thickness ulceration remains.  Has closed.     Musculoskeletal: Patient is ambulatory without assistive device or brace.  Patient is obese.  She has obtained new diabetic shoe gear and inserts and this will likely protect the area.    Hemoglobin A1C (%)   Date Value   12/13/2018 6.1 (H)   09/25/2017 5.9   03/24/2017 5.8   03/03/2016 6.0   11/25/2014 5.9   05/01/2014 6.0   04/19/2013 5.9   09/04/2012 6.0     Creatinine (mg/dL)   Date Value   07/12/2018 0.87   09/25/2017 0.71   03/24/2017 0.76   03/03/2016 0.78   02/23/2015 0.65   11/25/2014 0.75     ASSESSMENT:       ICD-10-CM    1. Type 2 diabetes, controlled, with peripheral neuropathy (H) E11.42    2. Diabetes mellitus due to underlying condition with foot ulcer (CODE) (H) E08.621    3. Xerosis of skin L85.3         PLAN:    4/24/2019  All 10 nails were debrided with a nail nipper.   I debrided the ulceration full thickness to a bleeding base and a sterile dressing applied.  May return to a Band-Aid or return to socks as soon as is dry.  Order for diabetic shoes  We discussed risk factors and preventive measures.    We discussed appropriate hygiene, shoe gear, daily foot exam, and reinforced management of weight, diet, activity goals and HA1C goal for diabetic patients.    Dispensed written foot care instructions.    All questions were answered to their satisfaction.    RTC once yearly for diabetic foot exam  RTC in about 5 or 6 weeks to confirm this ulceration closes.  Recommend normal bathing but avoid soaking  with the wound  Also recommend petroleum-based cream applied to both feet daily to reduce dry scaly fissured skin.    6/5/2019  I debrided the ulceration today with a 15 blade scalpel to the level of dermis.  Recommend petroleum-based hydration once daily and abrasive debridement at bath time.  Continue diabetic shoe gear which fits quite well today.  Follow-up once yearly for diabetic foot exam.  All questions answered      Zhang Kennedy DPM

## 2019-06-14 ENCOUNTER — OFFICE VISIT (OUTPATIENT)
Dept: FAMILY MEDICINE | Facility: OTHER | Age: 54
End: 2019-06-14
Payer: MEDICARE

## 2019-06-14 ENCOUNTER — TELEPHONE (OUTPATIENT)
Dept: FAMILY MEDICINE | Facility: OTHER | Age: 54
End: 2019-06-14

## 2019-06-14 VITALS
RESPIRATION RATE: 16 BRPM | DIASTOLIC BLOOD PRESSURE: 70 MMHG | TEMPERATURE: 96.9 F | OXYGEN SATURATION: 96 % | BODY MASS INDEX: 43.62 KG/M2 | WEIGHT: 246.25 LBS | HEART RATE: 70 BPM | SYSTOLIC BLOOD PRESSURE: 114 MMHG

## 2019-06-14 DIAGNOSIS — F20.9 SCHIZOPHRENIA (H): Primary | ICD-10-CM

## 2019-06-14 DIAGNOSIS — E11.42 TYPE 2 DIABETES, CONTROLLED, WITH PERIPHERAL NEUROPATHY (H): Primary | ICD-10-CM

## 2019-06-14 DIAGNOSIS — E78.5 HYPERLIPIDEMIA LDL GOAL <100: ICD-10-CM

## 2019-06-14 LAB
ALBUMIN SERPL-MCNC: 3.3 G/DL (ref 3.4–5)
ALP SERPL-CCNC: 103 U/L (ref 40–150)
ALT SERPL W P-5'-P-CCNC: 14 U/L (ref 0–50)
ANION GAP SERPL CALCULATED.3IONS-SCNC: 10 MMOL/L (ref 3–14)
AST SERPL W P-5'-P-CCNC: 14 U/L (ref 0–45)
BASOPHILS # BLD AUTO: 0 10E9/L (ref 0–0.2)
BASOPHILS NFR BLD AUTO: 0.3 %
BILIRUB SERPL-MCNC: 0.4 MG/DL (ref 0.2–1.3)
BUN SERPL-MCNC: 15 MG/DL (ref 7–30)
CALCIUM SERPL-MCNC: 8.5 MG/DL (ref 8.5–10.1)
CHLORIDE SERPL-SCNC: 108 MMOL/L (ref 94–109)
CHOLEST SERPL-MCNC: 123 MG/DL
CO2 SERPL-SCNC: 24 MMOL/L (ref 20–32)
CREAT SERPL-MCNC: 0.74 MG/DL (ref 0.52–1.04)
DIFFERENTIAL METHOD BLD: ABNORMAL
EOSINOPHIL # BLD AUTO: 0.1 10E9/L (ref 0–0.7)
EOSINOPHIL NFR BLD AUTO: 2.8 %
ERYTHROCYTE [DISTWIDTH] IN BLOOD BY AUTOMATED COUNT: 16.1 % (ref 10–15)
GFR SERPL CREATININE-BSD FRML MDRD: >90 ML/MIN/{1.73_M2}
GLUCOSE SERPL-MCNC: 97 MG/DL (ref 70–99)
HBA1C MFR BLD: 5.9 % (ref 0–5.6)
HCT VFR BLD AUTO: 53.8 % (ref 35–47)
HDLC SERPL-MCNC: 41 MG/DL
HGB BLD-MCNC: 17.8 G/DL (ref 11.7–15.7)
LDLC SERPL CALC-MCNC: 56 MG/DL
LYMPHOCYTES # BLD AUTO: 0.4 10E9/L (ref 0.8–5.3)
LYMPHOCYTES NFR BLD AUTO: 11.3 %
MCH RBC QN AUTO: 28.7 PG (ref 26.5–33)
MCHC RBC AUTO-ENTMCNC: 33.1 G/DL (ref 31.5–36.5)
MCV RBC AUTO: 87 FL (ref 78–100)
MONOCYTES # BLD AUTO: 0.2 10E9/L (ref 0–1.3)
MONOCYTES NFR BLD AUTO: 7 %
NEUTROPHILS # BLD AUTO: 2.6 10E9/L (ref 1.6–8.3)
NEUTROPHILS NFR BLD AUTO: 78.6 %
NONHDLC SERPL-MCNC: 82 MG/DL
PLATELET # BLD AUTO: 146 10E9/L (ref 150–450)
POTASSIUM SERPL-SCNC: 3.9 MMOL/L (ref 3.4–5.3)
PROT SERPL-MCNC: 7.4 G/DL (ref 6.8–8.8)
RBC # BLD AUTO: 6.21 10E12/L (ref 3.8–5.2)
SODIUM SERPL-SCNC: 142 MMOL/L (ref 133–144)
TRIGL SERPL-MCNC: 131 MG/DL
WBC # BLD AUTO: 3.3 10E9/L (ref 4–11)

## 2019-06-14 PROCEDURE — 99213 OFFICE O/P EST LOW 20 MIN: CPT | Performed by: FAMILY MEDICINE

## 2019-06-14 PROCEDURE — 80061 LIPID PANEL: CPT | Performed by: REGISTERED NURSE

## 2019-06-14 PROCEDURE — 83036 HEMOGLOBIN GLYCOSYLATED A1C: CPT | Performed by: FAMILY MEDICINE

## 2019-06-14 PROCEDURE — 80053 COMPREHEN METABOLIC PANEL: CPT | Performed by: REGISTERED NURSE

## 2019-06-14 PROCEDURE — 36415 COLL VENOUS BLD VENIPUNCTURE: CPT | Performed by: FAMILY MEDICINE

## 2019-06-14 PROCEDURE — 85025 COMPLETE CBC W/AUTO DIFF WBC: CPT | Performed by: REGISTERED NURSE

## 2019-06-14 ASSESSMENT — PATIENT HEALTH QUESTIONNAIRE - PHQ9: SUM OF ALL RESPONSES TO PHQ QUESTIONS 1-9: 0

## 2019-06-14 ASSESSMENT — PAIN SCALES - GENERAL: PAINLEVEL: NO PAIN (0)

## 2019-06-14 NOTE — PROGRESS NOTES
Subjective     Sana Ward is a 54 year old female who presents to clinic today for the following health issues:    HPI   Diabetes Follow-up      How often are you checking your blood sugar? Not at all    What time of day are you checking your blood sugars (select all that apply)?  Not checking blood sugar    Have you had any blood sugars above 200?  Not checking blood sugar    Have you had any blood sugars below 70?  Not checking blood sugar    What symptoms do you notice when your blood sugar is low?  None    What concerns do you have today about your diabetes? None     Do you have any of these symptoms? (Select all that apply)  No numbness or tingling in feet.  No redness, sores or blisters on feet.  No complaints of excessive thirst.  No reports of blurry vision.  No significant changes to weight.     Have you had a diabetic eye exam in the last 12 months? No    BP Readings from Last 2 Encounters:   06/14/19 114/70   06/05/19 120/72     Hemoglobin A1C (%)   Date Value   06/14/2019 5.9 (H)   12/13/2018 6.1 (H)     LDL Cholesterol Calculated (mg/dL)   Date Value   07/12/2018 65   09/25/2017 37       Diabetes Management Resources    Amount of exercise or physical activity: 2-3 days/week for an average of 15-30 minutes    Problems taking medications regularly: No    Medication side effects: none    Diet: diabetic      No diabetic eye exam in many years.      Patient Active Problem List   Diagnosis     Hypertension goal BP (blood pressure) < 130/80     Hyperlipidemia LDL goal <100     Mental health disorder     Advanced directives, counseling/discussion     Generalized anxiety disorder     Type 2 diabetes mellitus without complication (H)     Hypothyroidism due to acquired atrophy of thyroid     Status post ileostomy (H)     Obesity, Class III, BMI 40-49.9 (morbid obesity) (H)     Methamphetamine addiction (H)     Acute exacerbation of chronic paranoid schizophrenia (H)     Paranoid schizophrenia, chronic  "condition with acute exacerbation (H)     Psychosis (H)     Type 2 diabetes, controlled, with peripheral neuropathy (H)     Past Surgical History:   Procedure Laterality Date     COLECTOMY  9/11/2001    total colectomy due to Crohn's     GI SURGERY  2003    has ostomy bag      HERNIA REPAIR      over 20 years ago       Social History     Tobacco Use     Smoking status: Never Smoker     Smokeless tobacco: Never Used   Substance Use Topics     Alcohol use: No     Family History   Problem Relation Age of Onset     Diabetes Maternal Grandmother      Diabetes Maternal Grandfather          Current Outpatient Medications   Medication Sig Dispense Refill     CARAFATE 1 GM/10ML suspension APPLY A SMALL AMOUNT TO PARA STOMA 1-2 TIMES DAILY **NEEDS OFFICE VISIT FOR ANY FURTHER REFILLS** 100 mL 2     gabapentin (NEURONTIN) 100 MG capsule Take 100 mg capsule in Morning and 200 mg at bedtime. 180 capsule 1     haloperidol (HALDOL) 1 MG tablet Take 1 mg by mouth as needed As needed for hallucinations, voices       levothyroxine (SYNTHROID/LEVOTHROID) 75 MCG tablet TAKE 1 TABLET (75 MCG) BY MOUTH DAILY 30 tablet 3     LORazepam (ATIVAN) 0.25 MG TABS Take 0.25 mg by mouth At Bedtime.  20 tablet 0     losartan (COZAAR) 50 MG tablet Take 1 tablet (50 mg) by mouth daily 90 tablet 3     metFORMIN (GLUCOPHAGE) 500 MG tablet TAKE 1 TABLET BY MOUTH TWICE A DAY WITH MEALS 180 tablet 3     order for DME Equipment being ordered: Ostomy Supplies( Premier Drainable Pouch 1.125\") 1 Box 11     order for DME Equipment being ordered: Premier Drainable Pouch Misc    Ostomy Supplies 1 Box 3     order for DME daily Premier Drain Pouch 1.125\" to be used every day as directed. 90 each 3     ORDER FOR DME Equipment being ordered: ostomy supplies  Premier Drainable Pouches MISC. 20 each 6     ORDER FOR DME Sekou ostomy bags to be used as directed. 1 Box 6     ORDER FOR DME Yassine Cohesive seals to be used every day as directed. 1 Box 3     Ostomy " "Supplies (ADAPT BARRIER RING 1-3/16\") MISC USE EVERY DAY AS DIRECTED 10 each 11     Ostomy Supplies (ADAPT) PSTE APPLY TO AFFECTED AREA AS DIRECTED 1 each 3     Ostomy Supplies (PREMIER DRAINABLE) Pouch MISC USE EVERY DAY AS DIRECTED 20 each 11     Ostomy Supplies (SKIN GEL PROTECT DRESSING WIPE) PADS USE EVERY DAY AS DIRECTED 50 each 3     risperiDONE (RISPERDAL) 1 MG tablet Take 1 mg by mouth every morning  5     risperiDONE (RISPERDAL) 3 MG tablet Take 3.0 mg by mouth At Bedtime. 30 tablet 1     sertraline (ZOLOFT) 100 MG tablet Take 1 tablet (100 mg) by mouth daily 90 tablet 1     simvastatin (ZOCOR) 20 MG tablet Take 1 tablet (20 mg) by mouth At Bedtime 90 tablet 3     Skin Protectants, Misc. (NO STING BARRIER FILM) MISC USE EVERY DAY AS DIRECTED 25 each 3     traZODone (DESYREL) 100 MG tablet Take 1 tablet (100 mg) by mouth At Bedtime 90 tablet 1     Allergies   Allergen Reactions     Amoxicillin      Contrast Dye      Sulfa Drugs Hives     Recent Labs   Lab Test 06/14/19  1016 12/13/18  1337 07/12/18  0745 09/25/17  1123 03/24/17  1135 03/03/16  0851   A1C 5.9* 6.1*  --  5.9 5.8 6.0   LDL  --   --  65 37 66 48   HDL  --   --  44* 54 45* 44*   TRIG  --   --  78 71 70 104   ALT  --   --  18 15  --  17   CR  --   --  0.87 0.71 0.76 0.78   GFRESTIMATED  --   --  68 86 80 78   GFRESTBLACK  --   --  82 >90 >90   GFR Calc   >90   GFR Calc     POTASSIUM  --   --  4.0 4.1  --  4.3   TSH  --  5.27*  --  1.99 3.72 5.06*      BP Readings from Last 3 Encounters:   06/14/19 114/70   06/05/19 120/72   04/24/19 130/78    Wt Readings from Last 3 Encounters:   06/14/19 111.7 kg (246 lb 4 oz)   06/05/19 112 kg (247 lb)   04/24/19 114.3 kg (252 lb)                      Reviewed and updated as needed this visit by Provider  Tobacco  Allergies  Meds  Problems  Med Hx  Surg Hx  Fam Hx         Review of Systems   ROS COMP: CONSTITUTIONAL: NEGATIVE for fever, chills, change in weight  ENT/MOUTH: " "NEGATIVE for ear, mouth and throat problems  RESP: NEGATIVE for significant cough or SOB  CV: NEGATIVE for chest pain, palpitations or peripheral edema  ROS otherwise negative      Objective    /70 (BP Location: Left arm, Patient Position: Chair, Cuff Size: Adult Large)   Pulse 70   Temp 96.9  F (36.1  C) (Temporal)   Resp 16   Wt 111.7 kg (246 lb 4 oz)   SpO2 96%   BMI 43.62 kg/m    Body mass index is 43.62 kg/m .  Physical Exam   GENERAL: healthy, alert and no distress  NECK: no adenopathy, no asymmetry, masses, or scars and thyroid normal to palpation  RESP: lungs clear to auscultation - no rales, rhonchi or wheezes  CV: regular rate and rhythm, normal S1 S2, no S3 or S4, no murmur, click or rub, no peripheral edema and peripheral pulses strong  ABDOMEN: soft, nontender, no hepatosplenomegaly, no masses and bowel sounds normal  MS: no gross musculoskeletal defects noted, no edema  Diabetic foot exam: seen by Dr. Kennedy, podiatry last week.    Diagnostic Test Results:  Results for orders placed or performed in visit on 06/14/19 (from the past 24 hour(s))   Hemoglobin A1c   Result Value Ref Range    Hemoglobin A1C 5.9 (H) 0 - 5.6 %           Assessment & Plan     1. Type 2 diabetes, controlled, with peripheral neuropathy (H)  Chronic, controlled. The current medical regimen is effective;  continue present plan and medications.   - Hemoglobin A1c    2. Hyperlipidemia LDL goal <100  Chronic controlled. The current medical regimen is effective;  continue present plan and medications.        BMI:   Estimated body mass index is 43.62 kg/m  as calculated from the following:    Height as of 6/5/19: 1.6 m (5' 3\").    Weight as of this encounter: 111.7 kg (246 lb 4 oz).   Weight management plan: Discussed healthy diet and exercise guidelines        Patient Instructions   Make follow up eye exam with Dr. New      Return in about 6 months (around 12/14/2019) for Routine Visit, Lab Work.    Beck Myrick, " MD  [facility]

## 2019-06-14 NOTE — TELEPHONE ENCOUNTER
----- Message from Beck Martinez MD sent at 6/14/2019 12:03 PM CDT -----  Please notify patient, call or letter.  Lipid profile, renal function and hemoglobin remain stable. The current medical regimen is effective;  continue present plan and medications.   Electronically signed by Beck Martinez MD

## 2019-06-17 DIAGNOSIS — Z93.2 STATUS POST ILEOSTOMY (H): ICD-10-CM

## 2019-06-18 NOTE — TELEPHONE ENCOUNTER
"Requested Prescriptions   Pending Prescriptions Disp Refills     Ostomy Supplies (PREMIER DRAINABLE) Pouch MISC [Pharmacy Med Name: PREMIER DRAIN POUCH 1.125\"] 20 each 11     Sig: USE EVERY DAY AS DIRECTED       There is no refill protocol information for this order        "

## 2019-06-18 NOTE — TELEPHONE ENCOUNTER
Ostomy Supplies (PREMIER DRAINABLE) Pouch MISC       Last Written Prescription Date:  3/20/17  Last Fill Quantity: 1 box,   # refills: 11  Last Office Visit: 6/14/19  Future Office visit:       Routing refill request to provider for review/approval because:  Drug not on the [facility], P or [facility] refill protocol or controlled substance

## 2019-06-19 RX ORDER — COLOSTOMY BAGS 3/4"
EACH MISCELLANEOUS
Qty: 20 EACH | Refills: 11 | Status: SHIPPED | OUTPATIENT
Start: 2019-06-19 | End: 2020-09-08

## 2019-07-02 ENCOUNTER — TRANSFERRED RECORDS (OUTPATIENT)
Dept: HEALTH INFORMATION MANAGEMENT | Facility: CLINIC | Age: 54
End: 2019-07-02

## 2019-09-06 DIAGNOSIS — E03.4 HYPOTHYROIDISM DUE TO ACQUIRED ATROPHY OF THYROID: ICD-10-CM

## 2019-09-06 RX ORDER — LEVOTHYROXINE SODIUM 75 UG/1
75 TABLET ORAL DAILY
Qty: 30 TABLET | Refills: 3 | Status: SHIPPED | OUTPATIENT
Start: 2019-09-06 | End: 2019-12-13

## 2019-09-06 NOTE — TELEPHONE ENCOUNTER
"Requested Prescriptions   Pending Prescriptions Disp Refills     levothyroxine (SYNTHROID/LEVOTHROID) 75 MCG tablet [Pharmacy Med Name: LEVOTHYROXINE 75MCG TABLET] 30 tablet 3     Sig: TAKE 1 TABLET (75 MCG) BY MOUTH DAILY   Last Written Prescription Date:  5/22/19  Last Fill Quantity: 30,  # refills: 3   Last office visit: 6/14/2019 with prescribing provider:  6/14/19   Future Office Visit:        Thyroid Protocol Failed - 9/6/2019 10:53 AM        Failed - Normal TSH on file in past 12 months     Recent Labs   Lab Test 12/13/18  1337   TSH 5.27*              Passed - Patient is 12 years or older        Passed - Recent (12 mo) or future (30 days) visit within the authorizing provider's specialty     Patient had office visit in the last 12 months or has a visit in the next 30 days with authorizing provider or within the authorizing provider's specialty.  See \"Patient Info\" tab in inbasket, or \"Choose Columns\" in Meds & Orders section of the refill encounter.              Passed - Medication is active on med list        Passed - No active pregnancy on record     If patient is pregnant or has had a positive pregnancy test, please check TSH.          Passed - No positive pregnancy test in past 12 months     If patient is pregnant or has had a positive pregnancy test, please check TSH.          "

## 2019-09-06 NOTE — TELEPHONE ENCOUNTER
Routing refill request to provider for review/approval because:  Labs out of range:  TSH    CINTHIA BeaulieuN, RN  [facility]

## 2019-09-11 ENCOUNTER — TELEPHONE (OUTPATIENT)
Dept: FAMILY MEDICINE | Facility: OTHER | Age: 54
End: 2019-09-11

## 2019-09-11 DIAGNOSIS — E03.4 HYPOTHYROIDISM DUE TO ACQUIRED ATROPHY OF THYROID: Primary | ICD-10-CM

## 2019-09-11 NOTE — TELEPHONE ENCOUNTER
Reason for Call: Request for an order or referral:    Order or referral being requested: TSH lab draw    Date needed: as soon as possible    Has the patient been seen by the PCP for this problem? YES    Additional comments: patient and spouse present to clinic stating they received a phone call, recent refill notes TSH is out of range.  Notes from last December state was to return in 3 months for repeat TSH as medication dose was changed.   Notes recorded by Beck Martinez MD on 12/14/2018 at 8:18 AM CST  Please notify patient, call or letter.  Thyroid function is off slightly. I increased levothyroxine for 50 mcg to 75 mcg at [facility]. Plan to recheck thyroid function again in 3 months.  Electronically signed by Beck Martinez MD  Is provider still wanting this done?  Please place order or advise.  Patient is planning on coming tomorrow for lab.     Phone number Patient can be reached at:  Home number on file [phone] (home)    Best Time:  Any time    Can we leave a detailed message on this number?  YES    Call taken on 9/11/2019 at 10:58 AM by Kamilla Barron

## 2019-10-09 DIAGNOSIS — Z93.2 STATUS POST ILEOSTOMY (H): ICD-10-CM

## 2019-10-09 RX ORDER — SUCRALFATE ORAL 1 G/10ML
SUSPENSION ORAL
Qty: 100 ML | Refills: 2 | Status: SHIPPED | OUTPATIENT
Start: 2019-10-09 | End: 2020-09-30

## 2019-10-09 NOTE — TELEPHONE ENCOUNTER
"Requested Prescriptions   Pending Prescriptions Disp Refills     sucralfate (CARAFATE) 1 GM/10ML suspension [Pharmacy Med Name: SUCRALFATE 1G/10ML SUSP] 100 mL 2     Sig: APPLY A SMALL AMOUNT TO PARA STOMA 1-2 TIMES DAILY **NEEDS OFFICE VISIT FOR ANY FURTHER REFILLS**   Last Written Prescription Date:  1/22/19  Last Fill Quantity: 100 mL,  # refills: 2   Last office visit: 6/14/2019 with prescribing provider:  6/14/19   Future Office Visit:   Next 5 appointments (look out 90 days)    Dec 13, 2019 10:40 AM CST  [facility] Visit with Beck [facility] MD  [facility] [address] [facility] [address] [phone]           Miscellaneous Gastrointestinal Agents Passed - 10/9/2019  9:50 AM        Passed - Recent (12 mo) or future (30 days) visit within the authorizing provider's specialty     Patient has had an office visit with the authorizing provider or a provider within the authorizing providers department within the previous 12 mos or has a future within next 30 days. See \"Patient Info\" tab in inbasket, or \"Choose Columns\" in Meds & Orders section of the refill encounter.              Passed - Medication is active on med list        Passed - Patient is 18 years of age or older        "

## 2019-10-09 NOTE — TELEPHONE ENCOUNTER
Prescription approved per [facility] Refill Protocol.    CINTHIA BeaulieuN, RN  [facility]

## 2019-10-29 DIAGNOSIS — Z93.2 STATUS POST ILEOSTOMY (H): ICD-10-CM

## 2019-10-30 RX ORDER — KARAYA GUM
POWDER (GRAM) TOPICAL
Qty: 50 EACH | Refills: 3 | Status: SHIPPED | OUTPATIENT
Start: 2019-10-30 | End: 2020-12-17

## 2019-10-30 NOTE — TELEPHONE ENCOUNTER
Routing refill request to Covering provider for review    Ostomy supplies  Last Written Prescription Date:  08/03/2018  Last Fill Quantity: 50,  # refills: 3   Last office visit: 6/14/2019 with prescribing provider:    Future Office Visit:   Next 5 appointments (look out 90 days)    Dec 13, 2019 10:40 AM CST  Office Visit with Beck Martinez MD  [facility] [address] [facility] [id number] [phone]       Routing refill request to provider for review/approval because:  Drug not on the [facility] refill protocol     Requested Prescriptions   Pending Prescriptions Disp Refills     Ostomy Supplies (SKIN GEL PROTECT DRESSING WIPE) PADS 50 each 3     Sig: USE EVERY DAY AS DIRECTED       There is no refill protocol information for this order           Pt calling to see when he will be having his pacemaker surg. He can be reached at [phone].  [employer]

## 2019-11-01 DIAGNOSIS — F20.9 SCHIZOPHRENIA (H): Primary | ICD-10-CM

## 2019-11-01 LAB
BASOPHILS # BLD AUTO: 0 10E9/L (ref 0–0.2)
BASOPHILS NFR BLD AUTO: 0.3 %
DIFFERENTIAL METHOD BLD: ABNORMAL
EOSINOPHIL # BLD AUTO: 0.3 10E9/L (ref 0–0.7)
EOSINOPHIL NFR BLD AUTO: 4.6 %
ERYTHROCYTE [DISTWIDTH] IN BLOOD BY AUTOMATED COUNT: 13.5 % (ref 10–15)
HCT VFR BLD AUTO: 38.7 % (ref 35–47)
HGB BLD-MCNC: 12.7 G/DL (ref 11.7–15.7)
LYMPHOCYTES # BLD AUTO: 0.7 10E9/L (ref 0.8–5.3)
LYMPHOCYTES NFR BLD AUTO: 11 %
MCH RBC QN AUTO: 29 PG (ref 26.5–33)
MCHC RBC AUTO-ENTMCNC: 32.8 G/DL (ref 31.5–36.5)
MCV RBC AUTO: 88 FL (ref 78–100)
MONOCYTES # BLD AUTO: 0.5 10E9/L (ref 0–1.3)
MONOCYTES NFR BLD AUTO: 7.8 %
NEUTROPHILS # BLD AUTO: 4.8 10E9/L (ref 1.6–8.3)
NEUTROPHILS NFR BLD AUTO: 76.3 %
PLATELET # BLD AUTO: 252 10E9/L (ref 150–450)
RBC # BLD AUTO: 4.38 10E12/L (ref 3.8–5.2)
WBC # BLD AUTO: 6.3 10E9/L (ref 4–11)

## 2019-11-01 PROCEDURE — 85025 COMPLETE CBC W/AUTO DIFF WBC: CPT | Performed by: REGISTERED NURSE

## 2019-11-01 PROCEDURE — 36415 COLL VENOUS BLD VENIPUNCTURE: CPT | Performed by: REGISTERED NURSE

## 2019-11-16 DIAGNOSIS — Z93.2 ILEOSTOMY STATUS (H): ICD-10-CM

## 2019-11-16 DIAGNOSIS — E78.5 HYPERLIPIDEMIA LDL GOAL <100: ICD-10-CM

## 2019-11-16 DIAGNOSIS — Z93.2 STATUS POST ILEOSTOMY (H): ICD-10-CM

## 2019-11-18 RX ORDER — SIMVASTATIN 20 MG
TABLET ORAL
Qty: 90 TABLET | Refills: 0 | Status: SHIPPED | OUTPATIENT
Start: 2019-11-18 | End: 2020-02-11

## 2019-11-18 NOTE — TELEPHONE ENCOUNTER
"  Requested Prescriptions   Pending Prescriptions Disp Refills     simvastatin (ZOCOR) 20 MG tablet [Pharmacy Med Name: SIMVASTATIN 20MG TABLET] 90 tablet 3     Sig: TAKE 1 TABLET BY MOUTH DAILY AT BEDTIME   Last Written Prescription Date:  10/29/2018  Last Fill Quantity: 90,  # refills: 3   Last office visit: 6/14/2019 with prescribing provider:     Future Office Visit:   Next 5 appointments (look out 90 days)    Dec 13, 2019 10:40 AM CST  Office Visit with Beck Martinez MD  [facility] [address] [facility] [address]  [phone]             Statins Protocol Passed - 11/16/2019  9:01 AM        Passed - LDL on file in past 12 months     Recent Labs   Lab Test 06/14/19  1016   LDL 56           Passed - No abnormal creatine kinase in past 12 months     No lab results found.           Passed - Recent (12 mo) or future (30 days) visit within the authorizing provider's specialty     Patient has had an office visit with the authorizing provider or a provider within the authorizing providers department within the previous 12 mos or has a future within next 30 days. See \"Patient Info\" tab in inbasket, or \"Choose Columns\" in Meds & Orders section of the refill encounter.            Passed - Medication is active on med list        Passed - Patient is age 18 or older        Passed - No active pregnancy on record        Passed - No positive pregnancy test in past 12 months      Prescription approved per [facility] Refill Protocol.  Dolores Sheriff RN      "

## 2019-11-22 ENCOUNTER — TELEPHONE (OUTPATIENT)
Dept: FAMILY MEDICINE | Facility: OTHER | Age: 54
End: 2019-11-22

## 2019-11-22 DIAGNOSIS — Z12.31 VISIT FOR SCREENING MAMMOGRAM: Primary | ICD-10-CM

## 2019-11-27 ENCOUNTER — TELEPHONE (OUTPATIENT)
Dept: FAMILY MEDICINE | Facility: OTHER | Age: 54
End: 2019-11-27

## 2019-11-27 NOTE — TELEPHONE ENCOUNTER
Sana is asking if she needs to fast for her appointment on 12-13-19, please let her know, [phone].

## 2019-12-13 ENCOUNTER — OFFICE VISIT (OUTPATIENT)
Dept: FAMILY MEDICINE | Facility: OTHER | Age: 54
End: 2019-12-13
Payer: MEDICARE

## 2019-12-13 VITALS
DIASTOLIC BLOOD PRESSURE: 70 MMHG | HEART RATE: 80 BPM | BODY MASS INDEX: 42.95 KG/M2 | OXYGEN SATURATION: 95 % | WEIGHT: 242.44 LBS | RESPIRATION RATE: 18 BRPM | TEMPERATURE: 96.6 F | SYSTOLIC BLOOD PRESSURE: 112 MMHG

## 2019-12-13 DIAGNOSIS — E03.4 HYPOTHYROIDISM DUE TO ACQUIRED ATROPHY OF THYROID: ICD-10-CM

## 2019-12-13 DIAGNOSIS — E11.42 TYPE 2 DIABETES, CONTROLLED, WITH PERIPHERAL NEUROPATHY (H): Primary | ICD-10-CM

## 2019-12-13 DIAGNOSIS — Z93.2 STATUS POST ILEOSTOMY (H): ICD-10-CM

## 2019-12-13 LAB
CREAT UR-MCNC: 142 MG/DL
HBA1C MFR BLD: 5.9 % (ref 0–5.6)
MICROALBUMIN UR-MCNC: 9 MG/L
MICROALBUMIN/CREAT UR: 6.08 MG/G CR (ref 0–25)

## 2019-12-13 PROCEDURE — 99213 OFFICE O/P EST LOW 20 MIN: CPT | Performed by: FAMILY MEDICINE

## 2019-12-13 PROCEDURE — 83036 HEMOGLOBIN GLYCOSYLATED A1C: CPT | Performed by: FAMILY MEDICINE

## 2019-12-13 PROCEDURE — 36415 COLL VENOUS BLD VENIPUNCTURE: CPT | Performed by: FAMILY MEDICINE

## 2019-12-13 PROCEDURE — 82043 UR ALBUMIN QUANTITATIVE: CPT | Performed by: FAMILY MEDICINE

## 2019-12-13 RX ORDER — LEVOTHYROXINE SODIUM 75 UG/1
75 TABLET ORAL DAILY
Qty: 30 TABLET | Refills: 3 | Status: SHIPPED | OUTPATIENT
Start: 2019-12-13 | End: 2020-04-13

## 2019-12-13 ASSESSMENT — PAIN SCALES - GENERAL: PAINLEVEL: NO PAIN (0)

## 2019-12-13 NOTE — TELEPHONE ENCOUNTER
Requested Prescriptions   Pending Prescriptions Disp Refills     metFORMIN (GLUCOPHAGE) 500 MG tablet [Pharmacy Med Name: METFORMIN 500MG TABLET] 180 tablet 3     Sig: TAKE 1 TABLET BY MOUTH TWICE A DAY WITH MEALS   Last Written Prescription Date:  12/13/18  Last Fill Quantity: 180,  # refills: 3   Last office visit: 6/14/2019 with prescribing provider:  6/14/19   Future Office Visit:   Next 5 appointments (look out 90 days)    Dec 13, 2019 10:40 AM CST  Office Visit with Beck Martinez MD  [facility] ([facility] [address] [facility] [id number] [phone]           Biguanide Agents Failed - 12/13/2019  8:41 AM        Failed - Blood pressure less than 140/90 in past 6 months     BP Readings from Last 3 Encounters:   06/14/19 114/70   06/05/19 120/72   04/24/19 130/78                 Failed - Patient has documented A1c within the specified period of time.     If HgbA1C is 8 or greater, it needs to be on file within the past 3 months.  If less than 8, must be on file within the past 6 months.     Recent Labs   Lab Test 06/14/19  1016   A1C 5.9*             Passed - Patient has documented LDL within the past 12 mos.     Recent Labs   Lab Test 06/14/19  1016   LDL 56             Passed - Patient has had a Microalbumin in the past 15 mos.     Recent Labs   Lab Test 12/13/18  1330   MICROL 8   UMALCR 6.36             Passed - Patient is age 10 or older        Passed - Patient's CR is NOT>1.4 OR Patient's EGFR is NOT<45 within past 12 mos.     Recent Labs   Lab Test 06/14/19  1016   GFRESTIMATED >90   GFRESTBLACK >90       Recent Labs   Lab Test 06/14/19  1016   CR 0.74             Passed - Patient does NOT have a diagnosis of CHF.        Passed - Medication is active on med list        Passed - Patient is not pregnant        Passed - Patient has not had a positive pregnancy test within the past 12 mos.         Passed - Recent (6 mo) or future (30 days) visit within the authorizing  "provider's specialty     Patient had office visit in the last 6 months or has a visit in the next 30 days with authorizing provider or within the authorizing provider's specialty.  See \"Patient Info\" tab in inbasket, or \"Choose Columns\" in Meds & Orders section of the refill encounter.            "

## 2019-12-13 NOTE — PROGRESS NOTES
Subjective     Sana Ward is a 54 year old female who presents to clinic today for the following health issues:    HPI   Diabetes Follow-up      How often are you checking your blood sugar? Not at all    What concerns do you have today about your diabetes? None     Do you have any of these symptoms? (Select all that apply)  No numbness or tingling in feet.  No redness, sores or blisters on feet.  No complaints of excessive thirst.  No reports of blurry vision.  No significant changes to weight.     Have you had a diabetic eye exam in the last 12 months? Yes- Date of last eye exam: 07/02/2019    BP Readings from Last 2 Encounters:   12/13/19 112/70   06/14/19 114/70     Hemoglobin A1C (%)   Date Value   06/14/2019 5.9 (H)   12/13/2018 6.1 (H)     LDL Cholesterol Calculated (mg/dL)   Date Value   06/14/2019 56   07/12/2018 65       Diabetes Management Resources      How many servings of fruits and vegetables do you eat daily?  0-1    On average, how many sweetened beverages do you drink each day (Examples: soda, juice, sweet tea, etc.  Do NOT count diet or artificially sweetened beverages)?   0    How many days per week do you miss taking your medication? 0    Hypothyroidism Follow-up      Since last visit, patient describes the following symptoms: Weight stable, no hair loss, no skin changes, no constipation, no loose stools      Patient Active Problem List   Diagnosis     Hypertension goal BP (blood pressure) < 130/80     Hyperlipidemia LDL goal <100     Mental health disorder     Advanced directives, counseling/discussion     Generalized anxiety disorder     Type 2 diabetes mellitus without complication (H)     Hypothyroidism due to acquired atrophy of thyroid     Status post ileostomy (H)     Obesity, Class III, BMI 40-49.9 (morbid obesity) (H)     Methamphetamine addiction (H)     Acute exacerbation of chronic paranoid schizophrenia (H)     Paranoid schizophrenia, chronic condition with acute exacerbation  "(H)     Psychosis (H)     Type 2 diabetes, controlled, with peripheral neuropathy (H)     Past Surgical History:   Procedure Laterality Date     COLECTOMY  9/11/2001    total colectomy due to Crohn's     GI SURGERY  2003    has ostomy bag      HERNIA REPAIR      over 20 years ago       Social History     Tobacco Use     Smoking status: Never Smoker     Smokeless tobacco: Never Used   Substance Use Topics     Alcohol use: No     Family History   Problem Relation Age of Onset     Diabetes Maternal Grandmother      Diabetes Maternal Grandfather          Current Outpatient Medications   Medication Sig Dispense Refill     gabapentin (NEURONTIN) 100 MG capsule Take 100 mg capsule in Morning and 200 mg at bedtime. 180 capsule 1     haloperidol (HALDOL) 1 MG tablet Take 1 mg by mouth as needed As needed for hallucinations, voices       levothyroxine (SYNTHROID/LEVOTHROID) 75 MCG tablet TAKE 1 TABLET (75 MCG) BY MOUTH DAILY 30 tablet 3     LORazepam (ATIVAN) 0.25 MG TABS Take 0.25 mg by mouth At Bedtime.  20 tablet 0     losartan (COZAAR) 50 MG tablet Take 1 tablet (50 mg) by mouth daily 90 tablet 3     metFORMIN (GLUCOPHAGE) 500 MG tablet TAKE 1 TABLET BY MOUTH TWICE A DAY WITH MEALS 180 tablet 3     order for DME Equipment being ordered: Ostomy Supplies( Premier Drainable Pouch 1.125\") 1 Box 11     order for DME Equipment being ordered: Premier Drainable Pouch Misc    Ostomy Supplies 1 Box 3     order for DME daily Premier Drain Pouch 1.125\" to be used every day as directed. 90 each 3     ORDER FOR DME Equipment being ordered: ostomy supplies  Premier Drainable Pouches MISC. 20 each 6     ORDER FOR DME [place] ostomy bags to be used as directed. 1 Box 6     ORDER FOR DME [place] Cohesive seals to be used every day as directed. 1 Box 3     Ostomy Supplies (ADAPT BARRIER RING 1-3/16\") MISC USE EVERY DAY AS DIRECTED 10 each 11     Ostomy Supplies (ADAPT) PSTE APPLY TO AFFECTED AREA AS DIRECTED 1 each 3     Ostomy Supplies " (PREMIER DRAINABLE) Pouch MISC USE EVERY DAY AS DIRECTED 20 each 11     Ostomy Supplies (SKIN GEL PROTECT DRESSING WIPE) PADS USE EVERY DAY AS DIRECTED 50 each 3     risperiDONE (RISPERDAL) 1 MG tablet Take 1 mg by mouth every morning  5     risperiDONE (RISPERDAL) 3 MG tablet Take 3.0 mg by mouth At Bedtime. 30 tablet 1     sertraline (ZOLOFT) 100 MG tablet Take 1 tablet (100 mg) by mouth daily 90 tablet 1     simvastatin (ZOCOR) 20 MG tablet TAKE 1 TABLET BY MOUTH DAILY AT BEDTIME 90 tablet 0     Skin Protectants, Misc. (NO STING BARRIER FILM) MISC USE EVERY DAY AS DIRECTED 25 each 3     sucralfate (CARAFATE) 1 GM/10ML suspension APPLY A SMALL AMOUNT TO PARA STOMA 1-2 TIMES DAILY **NEEDS OFFICE VISIT FOR ANY FURTHER REFILLS** 100 mL 2     traZODone (DESYREL) 100 MG tablet Take 1 tablet (100 mg) by mouth At Bedtime 90 tablet 1     Allergies   Allergen Reactions     Amoxicillin      Contrast Dye      Sulfa Drugs Hives     Recent Labs   Lab Test 06/14/19  1016 12/13/18  1337 07/12/18  0745 09/25/17  1123   A1C 5.9* 6.1*  --  5.9   LDL 56  --  65 37   HDL 41*  --  44* 54   TRIG 131  --  78 71   ALT 14  --  18 15   CR 0.74  --  0.87 0.71   GFRESTIMATED >90  --  68 86   GFRESTBLACK >90  --  82 >90   POTASSIUM 3.9  --  4.0 4.1   TSH  --  5.27*  --  1.99      BP Readings from Last 3 Encounters:   12/13/19 112/70   06/14/19 114/70   06/05/19 120/72    Wt Readings from Last 3 Encounters:   12/13/19 110 kg (242 lb 7 oz)   06/14/19 111.7 kg (246 lb 4 oz)   06/05/19 112 kg (247 lb)                      Reviewed and updated as needed this visit by Provider  Tobacco  Allergies  Meds  Problems  Med Hx  Surg Hx  Fam Hx         Review of Systems   ROS COMP: CONSTITUTIONAL: NEGATIVE for fever, chills, change in weight  ENT/MOUTH: NEGATIVE for ear, mouth and throat problems  RESP: NEGATIVE for significant cough or SOB  CV: NEGATIVE for chest pain, palpitations or peripheral edema  ROS otherwise negative      Objective    BP  "112/70 (Patient Position: Chair, Cuff Size: Adult Regular)   Pulse 80   Temp 96.6  F (35.9  C) (Temporal)   Resp 18   Wt 110 kg (242 lb 7 oz)   SpO2 95%   BMI 42.95 kg/m    Body mass index is 42.95 kg/m .  Physical Exam   GENERAL: healthy, alert, no distress and obese  NECK: no adenopathy, no asymmetry, masses, or scars and thyroid normal to palpation  RESP: lungs clear to auscultation - no rales, rhonchi or wheezes  CV: regular rate and rhythm, normal S1 S2, no S3 or S4, no murmur, click or rub, no peripheral edema and peripheral pulses strong  ABDOMEN: soft, nontender, no hepatosplenomegaly, no masses and bowel sounds normal  MS: no gross musculoskeletal defects noted, no edema  Diabetic foot exam: normal DP and PT pulses, no trophic changes or ulcerative lesions and normal sensory exam    Diagnostic Test Results:  Labs reviewed in Epic  Results for orders placed or performed in visit on 12/13/19 (from the past 24 hour(s))   Hemoglobin A1c   Result Value Ref Range    Hemoglobin A1C 5.9 (H) 0 - 5.6 %           Assessment & Plan       ICD-10-CM    1. Type 2 diabetes, controlled, with peripheral neuropathy (H) E11.42 Hemoglobin A1c     Albumin Random Urine Quantitative with Creat Ratio     JUST IN CASE   2. Hypothyroidism due to acquired atrophy of thyroid E03.4 levothyroxine (SYNTHROID/LEVOTHROID) 75 MCG tablet   3. Status post ileostomy (H) Z93.2 metFORMIN (GLUCOPHAGE) 500 MG tablet        BMI:   Estimated body mass index is 42.95 kg/m  as calculated from the following:    Height as of 6/5/19: 1.6 m (5' 3\").    Weight as of this encounter: 110 kg (242 lb 7 oz).   Weight management plan: Discussed healthy diet and exercise guidelines        SELF MONITORING:       - Please check blood glucose readings daily  Work on weight loss  Regular exercise    Return in about 6 months (around 6/13/2020) for Lab Work fasting and recheck DM.    Beck Martinez MD  [facility]      "

## 2019-12-13 NOTE — TELEPHONE ENCOUNTER
**patient has an appointment today with PCP**    Routing refill request to provider for review/approval because:  Labs not current:  BP, A1C    CAMELIA Beaulieu, RN  [facility]

## 2019-12-17 ENCOUNTER — ANCILLARY PROCEDURE (OUTPATIENT)
Dept: MAMMOGRAPHY | Facility: OTHER | Age: 54
End: 2019-12-17
Payer: MEDICARE

## 2019-12-17 DIAGNOSIS — Z12.31 VISIT FOR SCREENING MAMMOGRAM: ICD-10-CM

## 2019-12-17 PROCEDURE — 77067 SCR MAMMO BI INCL CAD: CPT | Mod: TC

## 2020-01-02 DIAGNOSIS — I10 HYPERTENSION GOAL BP (BLOOD PRESSURE) < 130/80: ICD-10-CM

## 2020-01-03 RX ORDER — LOSARTAN POTASSIUM 50 MG/1
TABLET ORAL
Qty: 90 TABLET | Refills: 1 | Status: SHIPPED | OUTPATIENT
Start: 2020-01-03

## 2020-01-03 NOTE — TELEPHONE ENCOUNTER
Prescription approved per [facility] Refill Protocol.    CINTHIA BeaulieuN, RN  [facility]

## 2020-01-03 NOTE — TELEPHONE ENCOUNTER
"Requested Prescriptions   Pending Prescriptions Disp Refills     losartan (COZAAR) 50 MG tablet [Pharmacy Med Name: LOSARTAN 50MG TABLET] 90 tablet 3     Sig: TAKE 1 TABLET BY MOUTH EVERY DAY   Last Written Prescription Date:  12/13/18  Last Fill Quantity: 90,  # refills: 3   Last office visit: 12/13/2019 with prescribing provider:  12/13/19   Future Office Visit:        Angiotensin-II Receptors Passed - 1/2/2020 11:47 AM        Passed - Last blood pressure under 140/90 in past 12 months     BP Readings from Last 3 Encounters:   12/13/19 112/70   06/14/19 114/70   06/05/19 120/72                 Passed - Recent (12 mo) or future (30 days) visit within the authorizing provider's specialty     Patient has had an office visit with the authorizing provider or a provider within the authorizing providers department within the previous 12 mos or has a future within next 30 days. See \"Patient Info\" tab in inbasket, or \"Choose Columns\" in Meds & Orders section of the refill encounter.              Passed - Medication is active on med list        Passed - Patient is age 18 or older        Passed - No active pregnancy on record        Passed - Normal serum creatinine on file in past 12 months     Recent Labs   Lab Test 06/14/19  1016   CR 0.74             Passed - Normal serum potassium on file in past 12 months     Recent Labs   Lab Test 06/14/19  1016   POTASSIUM 3.9                    Passed - No positive pregnancy test in past 12 months        "

## 2020-01-07 ENCOUNTER — TELEPHONE (OUTPATIENT)
Dept: FAMILY MEDICINE | Facility: OTHER | Age: 55
End: 2020-01-07

## 2020-01-07 DIAGNOSIS — R30.0 DYSURIA: ICD-10-CM

## 2020-01-07 LAB
ALBUMIN UR-MCNC: NEGATIVE MG/DL
APPEARANCE UR: CLEAR
BILIRUB UR QL STRIP: NEGATIVE
COLOR UR AUTO: YELLOW
GLUCOSE UR STRIP-MCNC: NEGATIVE MG/DL
HGB UR QL STRIP: NEGATIVE
KETONES UR STRIP-MCNC: NEGATIVE MG/DL
LEUKOCYTE ESTERASE UR QL STRIP: NEGATIVE
NITRATE UR QL: NEGATIVE
PH UR STRIP: 6 PH (ref 5–7)
SOURCE: NORMAL
SP GR UR STRIP: 1.01 (ref 1–1.03)
UROBILINOGEN UR STRIP-ACNC: 0.2 EU/DL (ref 0.2–1)

## 2020-01-07 PROCEDURE — 81003 URINALYSIS AUTO W/O SCOPE: CPT | Performed by: PHYSICIAN ASSISTANT

## 2020-01-08 ENCOUNTER — TELEPHONE (OUTPATIENT)
Dept: FAMILY MEDICINE | Facility: OTHER | Age: 55
End: 2020-01-08

## 2020-01-08 NOTE — TELEPHONE ENCOUNTER
I called this patient  [facility] ( c2c on file ) with the following per Melvin Reyes PA-C: Please call with results. Please reassure patient that the urine returned without evidence of infection. If symptoms persist she should schedule for a clinic visit.

## 2020-01-08 NOTE — TELEPHONE ENCOUNTER
----- Message from Melvin Reyes PA-C sent at 1/7/2020  6:06 PM CST -----  Please call with results. Please reassure patient that the urine returned without evidence of infection. If symptoms persist she should schedule for a clinic visit.      Melvin Reyes PA-C on 1/7/2020 at 6:05 PM

## 2020-01-08 NOTE — TELEPHONE ENCOUNTER
Patient presents to the clinic today with her  and her mom. The patient's  said the last 3 days the patient has not been herself. He said she has been more confused, more incontinent with urine, disruptive, and uncontrolled movements with her arms and legs. Patient's  said the patient is normally quite calm. Patient's mom said she has seen patient like this a couple times but never as bad as it is today. Patient is moving a lot in the room. She is switching between 3 different chairs. She is throwing her arms in the air, kicking her legs, tapping chairs/counter/legs with hands, and very fidgety. She did not sit still once the entire time RN was with patient. Patient does not seem to have any urgent symptoms going on.     RN huddled with Dr. Martinez. 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 He recommended patient take 50 mg of Benadryl to help calm her movements and contact the psychiatrist who manages her antipsychotic medications.     RN informed patient, her , and patient's mom of Dr. Martinez's recommendation. They all 3 understand and agree with the plan of care. Patient's  said the patient has an appointment with the psychiatrist in early February. RN advised he needs to call the office sooner than her appointment. He said he will call them today and see if they can get patient in sooner or if they had any recommendations.  Patient's  agrees to call the clinic with any questions/concerns or worsening symptoms.     CAMELIA Beaulieu, RN  [facility]

## 2020-01-14 ENCOUNTER — TRANSFERRED RECORDS (OUTPATIENT)
Dept: HEALTH INFORMATION MANAGEMENT | Facility: CLINIC | Age: 55
End: 2020-01-14

## 2020-01-14 ENCOUNTER — TELEPHONE (OUTPATIENT)
Dept: FAMILY MEDICINE | Facility: OTHER | Age: 55
End: 2020-01-14

## 2020-01-14 LAB
ALT SERPL-CCNC: 30 U/L (ref 8–45)
AST SERPL-CCNC: 81 U/L (ref 5–41)
CREAT SERPL-MCNC: 0.83 MG/DL (ref 0.57–1.11)
GFR SERPL CREATININE-BSD FRML MDRD: >60 ML/MIN/1.73M(2)
GLUCOSE SERPL-MCNC: 123 MG/DL (ref 70–100)
INR PPP: 1.4 (ref 0.9–1.1)
POTASSIUM SERPL-SCNC: 3.1 MMOL/L (ref 3.5–5.1)

## 2020-01-14 NOTE — TELEPHONE ENCOUNTER
" calls today with concerns about patient.  He brought patient into clinic a week ago, see phone encounter.     [facility] reports that patient is supposed to be evaluated on Thursday of this week, however he is worried for her safety and his.  He states that patient has been sitting in the same spot for over 24 hours, she has not moved or eaten in that time.  She is just staring.  He reports that when he comes close to her she becomes upset.  He is nervous to sleep because he is not sure what she would do.  He doesn't feel she is hallucinating, but has not said more then about 10 words.  Unknown if she had taken any medications recently.       wants patient evaluated in the clinic and then admitted to \"[facility].\"   was educated that this is not able to be completed in the clinic and the patient would need to be seen in the ED or by her mental health provider.      Was able to speak with patient's mother, she was in agreement to have patient evaluated.  She will bring patient to either [facility] or the [facility] as she has been to both facilities in the past.      Maricel Elias RN   "

## 2020-01-22 ENCOUNTER — TELEPHONE (OUTPATIENT)
Dept: FAMILY MEDICINE | Facility: OTHER | Age: 55
End: 2020-01-22

## 2020-01-22 NOTE — TELEPHONE ENCOUNTER
Gilberto came in to notify us that Sana is home from the hospital and Sana was informed about physical therapy at the time of discharge and Gilberto states that Sana wont do physical therapy. He states she is using a walker at home.

## 2020-01-29 ENCOUNTER — TELEPHONE (OUTPATIENT)
Dept: FAMILY MEDICINE | Facility: OTHER | Age: 55
End: 2020-01-29

## 2020-01-29 DIAGNOSIS — A41.9 SEPSIS, DUE TO UNSPECIFIED ORGANISM, UNSPECIFIED WHETHER ACUTE ORGAN DYSFUNCTION PRESENT (H): Primary | ICD-10-CM

## 2020-01-29 NOTE — TELEPHONE ENCOUNTER
Reason for Call: Request for an order or referral:    Order or referral being requested: physical therapy    Date needed: as soon as possible    Has the patient been seen by the PCP for this problem? YES    Additional comments: The doctor at [facility] advised that she set up physical therapy when she was discharged.    Phone number Patient can be reached at:  Home number on file [phone] (home) - He will not have service until next month.  There is no way to get a hold of him until February.  Please send letter.    Best Time:      Can we leave a detailed message on this number?      Call taken on 1/29/2020 at 8:26 AM by Gopal Mott

## 2020-02-04 ENCOUNTER — HOSPITAL ENCOUNTER (OUTPATIENT)
Dept: PHYSICAL THERAPY | Facility: OTHER | Age: 55
Setting detail: THERAPIES SERIES
End: 2020-02-04
Attending: FAMILY MEDICINE
Payer: MEDICARE

## 2020-02-04 DIAGNOSIS — A41.9 SEPSIS, DUE TO UNSPECIFIED ORGANISM, UNSPECIFIED WHETHER ACUTE ORGAN DYSFUNCTION PRESENT (H): ICD-10-CM

## 2020-02-04 PROCEDURE — 97110 THERAPEUTIC EXERCISES: CPT | Mod: GP | Performed by: PHYSICAL THERAPIST

## 2020-02-04 PROCEDURE — 97161 PT EVAL LOW COMPLEX 20 MIN: CPT | Mod: GP | Performed by: PHYSICAL THERAPIST

## 2020-02-04 NOTE — PROGRESS NOTES
[facility]        OUTPATIENT PHYSICAL THERAPY FUNCTIONAL EVALUATION  PLAN OF TREATMENT FOR OUTPATIENT REHABILITATION  (COMPLETE FOR INITIAL CLAIMS ONLY)  Patient's Last Name, First Name, M.I.  YOB: 1965  Sana Ward     Provider's Name   [facility]   Medical Record No.  [id number]     Start of Care Date:  02/04/20   Onset Date:  01/19/20   Type:     _X__PT   ____OT  ____SLP Medical Diagnosis:  sepsis due to unspecified organism A41.9     PT Diagnosis:  weakness and decrease gait strength and safety  Visits from SOC:  1                              __________________________________________________________________________________  Plan of Treatment/Functional Goals:  ADL retraining, balance training, gait training, ROM, strengthening, stretching           GOALS  1  instruction in HEP and compliant with it 5 of 7 days to improve quality of life   05/04/20    2  patient to have 0 falls , in last year has had 4 falls   05/04/20    3  patient to be able to ambulate for tug without AD and in 15 seconds currently w/walker and 28 seconds   05/04/20                                                           Therapy Frequency:      Predicted Duration of Therapy Intervention:  patient and  agreeed to every other week x 2-3 months     Nalini Shabazz, PT                                    I CERTIFY THE NEED FOR THESE SERVICES FURNISHED UNDER        THIS PLAN OF TREATMENT AND WHILE UNDER MY CARE     (Physician co-signature of this document indicates review and certification of the therapy plan).                Certification Date From:  02/04/20   Certification Date To:  05/04/20    Referring Provider:  samuel barr MD     Initial Assessment  See Epic Evaluation- Start of Care Date: 02/04/20
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
## 2020-02-11 DIAGNOSIS — Z93.2 STATUS POST ILEOSTOMY (H): ICD-10-CM

## 2020-02-11 DIAGNOSIS — Z93.2 ILEOSTOMY STATUS (H): ICD-10-CM

## 2020-02-11 DIAGNOSIS — E78.5 HYPERLIPIDEMIA LDL GOAL <100: ICD-10-CM

## 2020-02-11 RX ORDER — SIMVASTATIN 20 MG
TABLET ORAL
Qty: 90 TABLET | Refills: 1 | Status: SHIPPED | OUTPATIENT
Start: 2020-02-11 | End: 2020-08-28

## 2020-02-11 NOTE — TELEPHONE ENCOUNTER
Prescription approved per [facility] Refill Protocol.    CINTHIA BeaulieuN, RN  [facility]

## 2020-02-11 NOTE — TELEPHONE ENCOUNTER
"Requested Prescriptions   Pending Prescriptions Disp Refills     simvastatin (ZOCOR) 20 MG tablet [Pharmacy Med Name: SIMVASTATIN 20MG TABLET] 90 tablet 0     Sig: TAKE 1 TABLET BY MOUTH DAILY AT BEDTIME   Last Written Prescription Date:  11/18/19  Last Fill Quantity: 90,  # refills: 0   Last office visit: 12/13/2019 with prescribing provider:  2/4/2020   Future Office Visit:        Statins Protocol Passed - 2/11/2020  2:00 PM        Passed - LDL on file in past 12 months     Recent Labs   Lab Test 06/14/19  1016   LDL 56             Passed - No abnormal creatine kinase in past 12 months     No lab results found.             Passed - Recent (12 mo) or future (30 days) visit within the authorizing provider's specialty     Patient has had an office visit with the authorizing provider or a provider within the authorizing providers department within the previous 12 mos or has a future within next 30 days. See \"Patient Info\" tab in inbasket, or \"Choose Columns\" in Meds & Orders section of the refill encounter.              Passed - Medication is active on med list        Passed - Patient is age 18 or older        Passed - No active pregnancy on record        Passed - No positive pregnancy test in past 12 months        "

## 2020-02-17 ENCOUNTER — OFFICE VISIT (OUTPATIENT)
Dept: FAMILY MEDICINE | Facility: OTHER | Age: 55
End: 2020-02-17
Payer: MEDICARE

## 2020-02-17 VITALS
RESPIRATION RATE: 20 BRPM | HEART RATE: 128 BPM | DIASTOLIC BLOOD PRESSURE: 72 MMHG | WEIGHT: 222.7 LBS | SYSTOLIC BLOOD PRESSURE: 110 MMHG | OXYGEN SATURATION: 98 % | TEMPERATURE: 97.7 F | BODY MASS INDEX: 39.46 KG/M2 | HEIGHT: 63 IN

## 2020-02-17 DIAGNOSIS — F20.0 PARANOID SCHIZOPHRENIA, CHRONIC CONDITION WITH ACUTE EXACERBATION (H): Primary | ICD-10-CM

## 2020-02-17 DIAGNOSIS — S00.12XA: ICD-10-CM

## 2020-02-17 PROCEDURE — 99213 OFFICE O/P EST LOW 20 MIN: CPT | Performed by: INTERNAL MEDICINE

## 2020-02-17 ASSESSMENT — MIFFLIN-ST. JEOR: SCORE: 1574.29

## 2020-02-17 ASSESSMENT — PAIN SCALES - GENERAL: PAINLEVEL: NO PAIN (0)

## 2020-02-18 ENCOUNTER — HOSPITAL ENCOUNTER (OUTPATIENT)
Dept: PHYSICAL THERAPY | Facility: OTHER | Age: 55
Setting detail: THERAPIES SERIES
End: 2020-02-18
Attending: FAMILY MEDICINE
Payer: MEDICARE

## 2020-02-18 PROCEDURE — 97110 THERAPEUTIC EXERCISES: CPT | Mod: GP | Performed by: PHYSICAL THERAPIST

## 2020-03-02 ENCOUNTER — TELEPHONE (OUTPATIENT)
Dept: FAMILY MEDICINE | Facility: OTHER | Age: 55
End: 2020-03-02

## 2020-03-02 DIAGNOSIS — N30.01 ACUTE CYSTITIS WITH HEMATURIA: ICD-10-CM

## 2020-03-02 DIAGNOSIS — R35.0 URINARY FREQUENCY: ICD-10-CM

## 2020-03-02 DIAGNOSIS — R35.0 URINARY FREQUENCY: Primary | ICD-10-CM

## 2020-03-02 LAB
ALBUMIN UR-MCNC: NEGATIVE MG/DL
APPEARANCE UR: ABNORMAL
BACTERIA #/AREA URNS HPF: ABNORMAL /HPF
BILIRUB UR QL STRIP: NEGATIVE
COLOR UR AUTO: YELLOW
GLUCOSE UR STRIP-MCNC: NEGATIVE MG/DL
HGB UR QL STRIP: ABNORMAL
KETONES UR STRIP-MCNC: NEGATIVE MG/DL
LEUKOCYTE ESTERASE UR QL STRIP: ABNORMAL
MUCOUS THREADS #/AREA URNS LPF: PRESENT /LPF
NITRATE UR QL: NEGATIVE
NON-SQ EPI CELLS #/AREA URNS LPF: ABNORMAL /LPF
PH UR STRIP: 6 PH (ref 5–7)
RBC #/AREA URNS AUTO: ABNORMAL /HPF
SOURCE: ABNORMAL
SP GR UR STRIP: >1.03 (ref 1–1.03)
UROBILINOGEN UR STRIP-ACNC: 0.2 EU/DL (ref 0.2–1)
WBC #/AREA URNS AUTO: ABNORMAL /HPF

## 2020-03-02 PROCEDURE — 81001 URINALYSIS AUTO W/SCOPE: CPT | Performed by: FAMILY MEDICINE

## 2020-03-02 RX ORDER — CIPROFLOXACIN 500 MG/1
500 TABLET, FILM COATED ORAL 2 TIMES DAILY
Qty: 14 TABLET | Refills: 0 | Status: SHIPPED | OUTPATIENT
Start: 2020-03-02 | End: 2020-03-09

## 2020-03-02 NOTE — TELEPHONE ENCOUNTER
I called the patient with the following per Dr. Martinez:  Please notify patient. Urine positive. Will send Rx for Cipro to pharmacy and culture urine.

## 2020-03-02 NOTE — TELEPHONE ENCOUNTER
Patient and her  walked into clinic today reporting urinary frequency. UA order pending per provider.     Nikole Baer MA     3/2/2020

## 2020-03-02 NOTE — TELEPHONE ENCOUNTER
----- Message from Beck Martinez MD sent at 3/2/2020  4:27 PM CST -----  Please notify patient. Urine positive. Will send Rx for Cipro to pharmacy and culture urine.  Electronically signed by Beck Martinez MD

## 2020-03-11 DIAGNOSIS — Z93.2 STATUS POST ILEOSTOMY (H): ICD-10-CM

## 2020-03-12 RX ORDER — OSTOMY SUPPLY
PASTE (GRAM) MISCELLANEOUS
Qty: 60 EACH | Refills: 1 | Status: SHIPPED | OUTPATIENT
Start: 2020-03-12 | End: 2020-06-24

## 2020-03-12 NOTE — TELEPHONE ENCOUNTER
Ostomy supplies      Last Written Prescription Date:  5/28/19  Last Fill Quantity: 1,   # refills: 3  Last Office Visit: 12/13/19  Future Office visit:    Next 5 appointments (look out 90 days)    Denny 10, 2020  9:40 AM CDT  Office Visit with Beck Martinez MD  [facility] [address] [facility] [address]  [phone]           Routing refill request to provider for review/approval because:  Drug not on the [facility], [facility] or [facility] refill protocol or controlled substance

## 2020-03-13 ENCOUNTER — HOSPITAL ENCOUNTER (EMERGENCY)
Facility: CLINIC | Age: 55
Discharge: HOME | End: 2020-03-13
Attending: EMERGENCY MEDICINE | Admitting: EMERGENCY MEDICINE
Payer: MEDICARE

## 2020-03-13 ENCOUNTER — APPOINTMENT (OUTPATIENT)
Dept: LAB | Facility: OTHER | Age: 55
End: 2020-03-13
Payer: MEDICARE

## 2020-03-13 VITALS
DIASTOLIC BLOOD PRESSURE: 88 MMHG | HEART RATE: 100 BPM | SYSTOLIC BLOOD PRESSURE: 121 MMHG | OXYGEN SATURATION: 98 % | RESPIRATION RATE: 18 BRPM | TEMPERATURE: 98.7 F

## 2020-03-13 DIAGNOSIS — D64.9 ANEMIA, UNSPECIFIED TYPE: ICD-10-CM

## 2020-03-13 DIAGNOSIS — N30.01 ACUTE CYSTITIS WITH HEMATURIA: ICD-10-CM

## 2020-03-13 LAB
ALBUMIN SERPL-MCNC: 3.4 G/DL (ref 3.4–5)
ALBUMIN UR-MCNC: NEGATIVE MG/DL
ALP SERPL-CCNC: 79 U/L (ref 40–150)
ALT SERPL W P-5'-P-CCNC: 15 U/L (ref 0–50)
ANION GAP SERPL CALCULATED.3IONS-SCNC: 9 MMOL/L (ref 3–14)
APPEARANCE UR: ABNORMAL
AST SERPL W P-5'-P-CCNC: 21 U/L (ref 0–45)
BASOPHILS # BLD AUTO: 0 10E9/L (ref 0–0.2)
BASOPHILS NFR BLD AUTO: 0.3 %
BILIRUB SERPL-MCNC: 0.4 MG/DL (ref 0.2–1.3)
BILIRUB UR QL STRIP: NEGATIVE
BUN SERPL-MCNC: 13 MG/DL (ref 7–30)
CALCIUM SERPL-MCNC: 8.6 MG/DL (ref 8.5–10.1)
CHLORIDE SERPL-SCNC: 106 MMOL/L (ref 94–109)
CO2 SERPL-SCNC: 25 MMOL/L (ref 20–32)
COLOR UR AUTO: YELLOW
CREAT SERPL-MCNC: 0.65 MG/DL (ref 0.52–1.04)
DIFFERENTIAL METHOD BLD: ABNORMAL
EOSINOPHIL NFR BLD AUTO: 0.8 %
ERYTHROCYTE [DISTWIDTH] IN BLOOD BY AUTOMATED COUNT: 15.4 % (ref 10–15)
GFR SERPL CREATININE-BSD FRML MDRD: >90 ML/MIN/{1.73_M2}
GLUCOSE SERPL-MCNC: 99 MG/DL (ref 70–99)
GLUCOSE UR STRIP-MCNC: NEGATIVE MG/DL
HCT VFR BLD AUTO: 34 % (ref 35–47)
HGB BLD-MCNC: 10.7 G/DL (ref 11.7–15.7)
HGB UR QL STRIP: ABNORMAL
IMM GRANULOCYTES # BLD: 0 10E9/L (ref 0–0.4)
IMM GRANULOCYTES NFR BLD: 0.4 %
KETONES UR STRIP-MCNC: 20 MG/DL
LACTATE BLD-SCNC: 0.9 MMOL/L (ref 0.7–2)
LEUKOCYTE ESTERASE UR QL STRIP: ABNORMAL
LYMPHOCYTES # BLD AUTO: 0.7 10E9/L (ref 0.8–5.3)
LYMPHOCYTES NFR BLD AUTO: 9 %
MCH RBC QN AUTO: 26.9 PG (ref 26.5–33)
MCHC RBC AUTO-ENTMCNC: 31.5 G/DL (ref 31.5–36.5)
MCV RBC AUTO: 85 FL (ref 78–100)
MONOCYTES # BLD AUTO: 0.5 10E9/L (ref 0–1.3)
MONOCYTES NFR BLD AUTO: 6.8 %
MUCOUS THREADS #/AREA URNS LPF: PRESENT /LPF
NEUTROPHILS # BLD AUTO: 6 10E9/L (ref 1.6–8.3)
NEUTROPHILS NFR BLD AUTO: 82.7 %
NITRATE UR QL: NEGATIVE
NRBC # BLD AUTO: 0 10*3/UL
NRBC BLD AUTO-RTO: 0 /100
PH UR STRIP: 5 PH (ref 5–7)
PLATELET # BLD AUTO: 292 10E9/L (ref 150–450)
POTASSIUM SERPL-SCNC: 4 MMOL/L (ref 3.4–5.3)
PROT SERPL-MCNC: 7.5 G/DL (ref 6.8–8.8)
RBC # BLD AUTO: 3.98 10E12/L (ref 3.8–5.2)
RBC #/AREA URNS AUTO: 3 /HPF (ref 0–2)
SODIUM SERPL-SCNC: 140 MMOL/L (ref 133–144)
SOURCE: ABNORMAL
SP GR UR STRIP: 1.02 (ref 1–1.03)
SQUAMOUS #/AREA URNS AUTO: 59 /HPF (ref 0–1)
UROBILINOGEN UR STRIP-MCNC: 0 MG/DL (ref 0–2)
WBC # BLD AUTO: 7.2 10E9/L (ref 4–11)
WBC #/AREA URNS AUTO: 8 /HPF (ref 0–5)

## 2020-03-13 PROCEDURE — 96361 HYDRATE IV INFUSION ADD-ON: CPT | Performed by: EMERGENCY MEDICINE

## 2020-03-13 PROCEDURE — 87088 URINE BACTERIA CULTURE: CPT | Performed by: EMERGENCY MEDICINE

## 2020-03-13 PROCEDURE — 99284 EMERGENCY DEPT VISIT MOD MDM: CPT | Mod: 25 | Performed by: EMERGENCY MEDICINE

## 2020-03-13 PROCEDURE — 87086 URINE CULTURE/COLONY COUNT: CPT | Performed by: EMERGENCY MEDICINE

## 2020-03-13 PROCEDURE — 99285 EMERGENCY DEPT VISIT HI MDM: CPT | Mod: Z6 | Performed by: EMERGENCY MEDICINE

## 2020-03-13 PROCEDURE — 80053 COMPREHEN METABOLIC PANEL: CPT | Performed by: EMERGENCY MEDICINE

## 2020-03-13 PROCEDURE — 25800030 ZZH RX IP 258 OP 636: Performed by: EMERGENCY MEDICINE

## 2020-03-13 PROCEDURE — 96365 THER/PROPH/DIAG IV INF INIT: CPT | Performed by: EMERGENCY MEDICINE

## 2020-03-13 PROCEDURE — 83605 ASSAY OF LACTIC ACID: CPT | Performed by: EMERGENCY MEDICINE

## 2020-03-13 PROCEDURE — 81001 URINALYSIS AUTO W/SCOPE: CPT | Performed by: FAMILY MEDICINE

## 2020-03-13 PROCEDURE — 85025 COMPLETE CBC W/AUTO DIFF WBC: CPT | Performed by: EMERGENCY MEDICINE

## 2020-03-13 PROCEDURE — 25000128 H RX IP 250 OP 636: Performed by: EMERGENCY MEDICINE

## 2020-03-13 PROCEDURE — 87186 SC STD MICRODIL/AGAR DIL: CPT | Performed by: EMERGENCY MEDICINE

## 2020-03-13 RX ORDER — CEPHALEXIN 500 MG/1
500 CAPSULE ORAL 2 TIMES DAILY
Qty: 14 CAPSULE | Refills: 0 | Status: SHIPPED | OUTPATIENT
Start: 2020-03-13 | End: 2020-03-20

## 2020-03-13 RX ORDER — CEFTRIAXONE 1 G/1
1 INJECTION, POWDER, FOR SOLUTION INTRAMUSCULAR; INTRAVENOUS ONCE
Status: COMPLETED | OUTPATIENT
Start: 2020-03-13 | End: 2020-03-13

## 2020-03-13 RX ORDER — SODIUM CHLORIDE 9 MG/ML
1000 INJECTION, SOLUTION INTRAVENOUS CONTINUOUS
Status: DISCONTINUED | OUTPATIENT
Start: 2020-03-13 | End: 2020-03-13 | Stop reason: HOSPADM

## 2020-03-13 RX ADMIN — CEFTRIAXONE SODIUM 1 G: 1 INJECTION, POWDER, FOR SOLUTION INTRAMUSCULAR; INTRAVENOUS at 17:16

## 2020-03-13 RX ADMIN — SODIUM CHLORIDE 1000 ML: 9 INJECTION, SOLUTION INTRAVENOUS at 15:27

## 2020-03-13 ASSESSMENT — ENCOUNTER SYMPTOMS
DIFFICULTY URINATING: 0
BACK PAIN: 0
HEMATURIA: 0
FEVER: 0
VOMITING: 0
FREQUENCY: 0

## 2020-03-13 NOTE — DISCHARGE INSTRUCTIONS
A follow-up appointment has been scheduled for you to see Dr. Martinez    Take antibiotics as prescribed    Continue remainder of your medications without any changes    Keep follow-up appointment with psychiatry to discuss possible changes or additions to psychiatric medications    Return to the ER if you have any new or concerning symptoms

## 2020-03-13 NOTE — ED TRIAGE NOTES
Presents to ED with concerns for UTI symptoms. Patient was hospitalized in January for UTI. Patient's  states that she's urinating less frequently.

## 2020-03-13 NOTE — ED AVS SNAPSHOT
[facility] Emergency Department  [address] [place] [address]  Phone:  [phone]  Fax:  [phone]                                    Sana Ward   MRN: [id number]    Department:  [facility] Emergency Department   Date of Visit:  3/13/2020           After Visit Summary Signature Page    I have received my discharge instructions, and my questions have been answered. I have discussed any challenges I see with this plan with the nurse or doctor.    ..........................................................................................................................................  Patient/Patient Representative Signature      ..........................................................................................................................................  Patient Representative Print Name and Relationship to Patient    ..................................................               ................................................  Date                                   Time    ..........................................................................................................................................  Reviewed by Signature/Title    ...................................................              ..............................................  Date                                               Time          22EPIC Rev 08/18

## 2020-03-13 NOTE — ED PROVIDER NOTES
History     Chief Complaint   Patient presents with     Rule out Urinary Tract Infection     HPI  Sana Ward is a 55 year old female who sense with her parents for concern of urinary tract infection.  She presents with her  and her mother who gives most of the history.  Patient has a known history of schizophrenia and is delayed, she is also being somewhat uncooperative.  Family members are concerned that she has a UTI.  She was hospitalized for a UTI in January and was septic at that time.  She has since followed up with her primary provider, and has been tested for a UTI at a later date, and treated as an outpatient.  Family is concerned today because she seems to be not acting like herself.  They said when she was hospitalized last time she was very aggressive and combative.  Now she is less so, but she hangs her head down and does not want to look at them or interact with them.  She says that she does not care about anything.  Seems very down and depressed.  Is refusing many of her medications.    Patient is answering questions appropriately when asked by provider.  She denies any concerns right now.  Has not been febrile, does not have any back pain, has not been vomiting, and she denies any dysuria, urinary frequency, or hematuria.    Allergies:  Allergies   Allergen Reactions     Amoxicillin      Contrast Dye      Sulfa Drugs Hives       Problem List:    Patient Active Problem List    Diagnosis Date Noted     Bruise of left periocular tissue 02/17/2020     Priority: Medium     Type 2 diabetes, controlled, with peripheral neuropathy (H) 04/24/2019     Priority: Medium     Obesity, Class III, BMI 40-49.9 (morbid obesity) (H) 10/28/2015     Priority: Medium     Status post ileostomy (H) 01/31/2014     Priority: Medium     Hypothyroidism due to acquired atrophy of thyroid 04/19/2013     Priority: Medium     Methamphetamine addiction (H) 08/01/2012     Priority: Medium     Acute exacerbation of  chronic paranoid schizophrenia (H) 08/01/2012     Priority: Medium     Paranoid schizophrenia, chronic condition with acute exacerbation (H) 08/01/2012     Priority: Medium     Psychosis (H) 08/01/2012     Priority: Medium     Generalized anxiety disorder 06/15/2012     Priority: Medium     Diagnosis updated by automated process. Provider to review and confirm.       Type 2 diabetes mellitus without complication (H) 06/15/2012     Priority: Medium     Advanced directives, counseling/discussion 11/03/2011     Priority: Medium     Advance Care Planning 6/24/2015: Receipt of ACP document:  Received: Health Care Directive which was witnessed or notarized on 6/19/15.  Document not previously scanned.  Validation form completed and sent with document to be scanned.  Code Status reflects choices in most recent ACP document.  Confirmed/documented designated decision maker(s).  Added by Jimbo Fernández            Advance Care Planning:   Receipt of ACP document:  Received: Health Care Directive which was witnessed or notarized on 8-.  Document not previously scanned.  Validation form completed and sent with document to be scanned.  Confirmed/documented designated decision maker(s). See permanent comments section of demographics in clinical tab. View document(s) and details by clicking on code status.   Added by Yesica Betts on 9/8/2014.       Mental health disorder 10/17/2011     Priority: Medium     Hypertension goal BP (blood pressure) < 130/80 05/02/2011     Priority: Medium     Hyperlipidemia LDL goal <100 05/02/2011     Priority: Medium        Past Medical History:    Past Medical History:   Diagnosis Date     Delirium 11/19/11     Diabetes mellitus, type 2 (H) 6/15/2012     Diabetic eye exam (H) 5/12/14     Generalised anxiety disorder 6/15/2012     Hyperthyroidism      Obesity, Class III, BMI 40-49.9 (morbid obesity) (H) 10/28/2015     Post traumatic stress disorder        Past Surgical History:    Past Surgical  "History:   Procedure Laterality Date     COLECTOMY  9/11/2001    total colectomy due to Crohn's     GI SURGERY  2003    has ostomy bag      HERNIA REPAIR      over 20 years ago       Family History:    Family History   Problem Relation Age of Onset     Diabetes Maternal Grandmother      Diabetes Maternal Grandfather      Breast Cancer Mother        Social History:  Marital Status:   [2]  Social History     Tobacco Use     Smoking status: Never Smoker     Smokeless tobacco: Never Used   Substance Use Topics     Alcohol use: No     Drug use: No        Medications:    gabapentin (NEURONTIN) 100 MG capsule  haloperidol (HALDOL) 1 MG tablet  levothyroxine (SYNTHROID/LEVOTHROID) 75 MCG tablet  LORazepam (ATIVAN) 0.25 MG TABS  losartan (COZAAR) 50 MG tablet  metFORMIN (GLUCOPHAGE) 500 MG tablet  order for DME  order for DME  order for DME  ORDER FOR DME  ORDER FOR DME  ORDER FOR DME  Ostomy Supplies (ADAPT BARRIER RING 1-3/16\") MISC  Ostomy Supplies (ADAPT) PSTE  Ostomy Supplies (PREMIER DRAINABLE) Pouch MISC  Ostomy Supplies (SKIN GEL PROTECT DRESSING WIPE) PADS  risperiDONE (RISPERDAL) 1 MG tablet  risperiDONE (RISPERDAL) 3 MG tablet  sertraline (ZOLOFT) 100 MG tablet  simvastatin (ZOCOR) 20 MG tablet  Skin Protectants, Misc. (NO STING BARRIER FILM) MISC  sucralfate (CARAFATE) 1 GM/10ML suspension          Review of Systems   Unable to perform ROS: Psychiatric disorder   Constitutional: Negative for fever.   Gastrointestinal: Negative for vomiting.   Genitourinary: Negative for difficulty urinating, frequency and hematuria.   Musculoskeletal: Negative for back pain.       Physical Exam   BP: 121/88  Pulse: 100  Temp: 98.7  F (37.1  C)  Resp: 18  SpO2: 98 %      Physical Exam  Vitals signs and nursing note reviewed.   Constitutional:       General: She is not in acute distress.     Appearance: She is obese.   HENT:      Mouth/Throat:      Mouth: Mucous membranes are moist.      Pharynx: Oropharynx is clear.   Neck: "      Musculoskeletal: Normal range of motion and neck supple.   Cardiovascular:      Rate and Rhythm: Normal rate and regular rhythm.   Pulmonary:      Effort: Pulmonary effort is normal.      Breath sounds: Normal breath sounds.   Abdominal:      General: Bowel sounds are normal.      Palpations: Abdomen is soft.      Tenderness: There is no abdominal tenderness. There is no guarding.   Skin:     General: Skin is warm and dry.   Neurological:      Mental Status: She is alert.   Psychiatric:         Speech: Speech normal.         ED Course        Procedures               Critical Care time:  none               Results for orders placed or performed during the hospital encounter of 03/13/20 (from the past 24 hour(s))   CBC with platelets differential   Result Value Ref Range    WBC 7.2 4.0 - 11.0 10e9/L    RBC Count 3.98 3.8 - 5.2 10e12/L    Hemoglobin 10.7 (L) 11.7 - 15.7 g/dL    Hematocrit 34.0 (L) 35.0 - 47.0 %    MCV 85 78 - 100 fl    MCH 26.9 26.5 - 33.0 pg    MCHC 31.5 31.5 - 36.5 g/dL    RDW 15.4 (H) 10.0 - 15.0 %    Platelet Count 292 150 - 450 10e9/L    Diff Method Automated Method     % Neutrophils 82.7 %    % Lymphocytes 9.0 %    % Monocytes 6.8 %    % Eosinophils 0.8 %    % Basophils 0.3 %    % Immature Granulocytes 0.4 %    Nucleated RBCs 0 0 /100    Absolute Neutrophil 6.0 1.6 - 8.3 10e9/L    Absolute Lymphocytes 0.7 (L) 0.8 - 5.3 10e9/L    Absolute Monocytes 0.5 0.0 - 1.3 10e9/L    Absolute Basophils 0.0 0.0 - 0.2 10e9/L    Abs Immature Granulocytes 0.0 0 - 0.4 10e9/L    Absolute Nucleated RBC 0.0    Comprehensive metabolic panel   Result Value Ref Range    Sodium 140 133 - 144 mmol/L    Potassium 4.0 3.4 - 5.3 mmol/L    Chloride 106 94 - 109 mmol/L    Carbon Dioxide 25 20 - 32 mmol/L    Anion Gap 9 3 - 14 mmol/L    Glucose 99 70 - 99 mg/dL    Urea Nitrogen 13 7 - 30 mg/dL    Creatinine 0.65 0.52 - 1.04 mg/dL    GFR Estimate >90 >60 mL/min/[1.73_m2]    GFR Estimate If Black >90 >60 mL/min/[1.73_m2]     Calcium 8.6 8.5 - 10.1 mg/dL    Bilirubin Total 0.4 0.2 - 1.3 mg/dL    Albumin 3.4 3.4 - 5.0 g/dL    Protein Total 7.5 6.8 - 8.8 g/dL    Alkaline Phosphatase 79 40 - 150 U/L    ALT 15 0 - 50 U/L    AST 21 0 - 45 U/L   Lactic acid whole blood   Result Value Ref Range    Lactic Acid 0.9 0.7 - 2.0 mmol/L   UA reflex to Microscopic and Culture    Specimen: Catheterized Urine   Result Value Ref Range    Color Urine Yellow     Appearance Urine Cloudy     Glucose Urine Negative NEG^Negative mg/dL    Bilirubin Urine Negative NEG^Negative    Ketones Urine 20 (A) NEG^Negative mg/dL    Specific Gravity Urine 1.018 1.003 - 1.035    Blood Urine Small (A) NEG^Negative    pH Urine 5.0 5.0 - 7.0 pH    Protein Albumin Urine Negative NEG^Negative mg/dL    Urobilinogen mg/dL 0.0 0.0 - 2.0 mg/dL    Nitrite Urine Negative NEG^Negative    Leukocyte Esterase Urine Trace (A) NEG^Negative    Source Catheterized Urine     RBC Urine 3 (H) 0 - 2 /HPF    WBC Urine 8 (H) 0 - 5 /HPF    Squamous Epithelial /HPF Urine 59 (H) 0 - 1 /HPF    Mucous Urine Present (A) NEG^Negative /LPF       Medications   0.9% sodium chloride BOLUS (0 mLs Intravenous Stopped 3/13/20 1630)   cefTRIAXone (ROCEPHIN) 1 g vial to attach to  mL bag for ADULTS or NS 50 mL bag for PEDS (0 g Intravenous Stopped 3/13/20 1747)       Assessments & Plan (with Medical Decision Making)  Sana is a 55-year-old female with past medical history of schizophrenia, PTSD, diabetes, morbid obesity, and recurrent UTIs, who presents with family over concern over possible UTI.  They state that she is not acting like herself, seems more down and depressed and is uncooperative with taking many of her normal medications.  They attempted to go to her primary provider today, and when she was unable to give a urine sample in the office, her primary provider encouraged family to bring her to the ER.  History and physical exam as above  Preferral IV was obtained and patient was given a 1 L normal  saline bolus.  She said that she could not give us a urine sample, so straight cath to obtain urine sample completed by RN.  Also clara labs.  Results as above.  Lactic acid level is within normal limits, white blood cell count is within normal limits, CMP all within normal limits.  Cath UA shows trace leukocyte esterase, negative nitrites, small amount of blood, positive for white blood cells.  This was sent for culture.  When compared with most recent urinalysis, tested in office 3/2/20, appears to be improved, but that was clean-catch urine.  That sample does not appear to have been sent for culture, do not see any results in patient history.  She was treated with Cipro at that time, and family member state that she completed that course.  Due to his noted difficulty of getting patient to comply with her medication regimen, given IV Rocephin here in the ER.  Will give p.o. Keflex on twice daily schedule to hopefully increase medication compliance.  Suggested that they follow-up with Dr. Martinez if symptoms persist.  They also note that patient has a follow-up appointment with her psychiatrist Tuesday, 3/17/2020, and I suggest that they discuss these recent depressive symptoms and mood change since her medications may need to be adjusted.  He understands and agrees.  Also discussed reasons to return to the ER.  They are comfortable with this plan and are agreeable to discharge at this time.     I have reviewed the nursing notes.    I have reviewed the findings, diagnosis, plan and need for follow up with the patient.       Discharge Medication List as of 3/13/2020  6:00 PM      START taking these medications    Details   cephALEXin (KEFLEX) 500 MG capsule Take 1 capsule (500 mg) by mouth 2 times daily for 7 days, Disp-14 capsule,R-0, E-Prescribe             Final diagnoses:   Acute cystitis with hematuria   Anemia, unspecified type       3/13/2020   [facility] EMERGENCY DEPARTMENT     Tamara Jackson  DO Ni  03/13/20 2053

## 2020-03-16 ENCOUNTER — TRANSFERRED RECORDS (OUTPATIENT)
Dept: HEALTH INFORMATION MANAGEMENT | Facility: CLINIC | Age: 55
End: 2020-03-16

## 2020-03-16 LAB
BACTERIA SPEC CULT: ABNORMAL
SPECIMEN SOURCE: ABNORMAL

## 2020-03-16 NOTE — RESULT ENCOUNTER NOTE
Final Urine Culture Report on 3/16/20  Emergency Dept/Urgent Care discharge antibiotic prescribed: Cephalexin (Keflex) 500 mg capsule, 1 capsule (500 mg) by mouth 2 times daily for 7 days.   #1. Bacteria, 10,000 - 50,000 colonies/mL Coagulase Negative Staphylococcus, is SUSCEPTIBLE to Antibiotic.    As per [facility] ED Lab Result protocol, no change in antibiotic therapy.

## 2020-03-17 NOTE — PROGRESS NOTES
Outpatient Physical Therapy Discharge Note     Patient: Sana Ward  : 1965    Beginning/End Dates of Reporting Period:  20 to 3/17/2020    Referring Provider: samuel barr MD     Therapy Diagnosis: weakness     Client Self Report: seen with  they report she is back to normal has been able to go to [facility] and her group and is very happy , did get up to quick and hit her eye on container     Objective Measurements:  Objective Measure: tug 28 seconds , 4 falls in last year at [facility]   Details: had 1 fall had blanket on and didnt take it off and tripped on blanket , tug 14 seconds       patient seen for 2 Rx sessions for exercises in clnic and instruction in HEP at last Rx she was completing the following   SLR 10x and sitting B UE horizontal abduction 10x yellow theraband , walking at home 1-2 x day , standing at counter marching , side step , step backwards 10x   Her  cancelled her last Rx as she was doing very well and didn't feel she needed further therapy            Goals:  Goal Identifier 1   Goal Description instruction in HEP and compliant with it 5 of 7 days to improve quality of life    Target Date 20   Date Met      Progress:     Goal Identifier 2   Goal Description patient to have 0 falls , in last year has had 4 falls    Target Date 20   Date Met      Progress:     Goal Identifier 3   Goal Description patient to be able to ambulate for tug without AD and in 15 seconds currently w/walker and 28 seconds    Target Date 20   Date Met      Progress:       Progress Toward Goals:   Progress this reporting period: patient is very compliant with HEP and is meeting all therapy goals     Plan:  Discharge from therapy.    Discharge:    Reason for Discharge: Patient has met all goals.    Equipment Issued: none    Discharge Plan: Patient to continue home program.

## 2020-03-23 ENCOUNTER — TELEPHONE (OUTPATIENT)
Dept: FAMILY MEDICINE | Facility: OTHER | Age: 55
End: 2020-03-23

## 2020-03-23 NOTE — TELEPHONE ENCOUNTER
Reason for Call:  Same Day Appointment, Requested Provider:  Beck Martinez M.D.    PCP: Beck Martinez    Reason for visit: Post Hospital check for suicidal ideation     Duration of symptoms:      Have you been treated for this in the past? Yes    Additional comments: [facility] recommendation to be seen in 7-10 days.     Can we leave a detailed message on this number? NO    Phone number patient can be reached at: Other phone number:   [phone]    Best Time: any     Call taken on 3/23/2020 at 12:55 PM by Melinda Swann

## 2020-03-23 NOTE — TELEPHONE ENCOUNTER
OK for workin telephone encounter in 1 week. Please call patient  to schedule time.  Electronically signed by Beck Martinez MD

## 2020-03-23 NOTE — TELEPHONE ENCOUNTER
"RN called JOHN Rodrígeuz from  [facility]  Calling : [phone].     \" Sana is being discharged tomorrow.  We need to know the date and time that there will be a follow-up call made to her so we can document this in her discharge papers.\"   RN  Here will forward this message to that Dr. Martinez, so he  can look at this schedule and decide when a follow-up will be made.  Please call [facility]  Back with a date and time  ASAP. Thank you........JOHN Mike    "

## 2020-03-25 ENCOUNTER — TELEPHONE (OUTPATIENT)
Dept: FAMILY MEDICINE | Facility: OTHER | Age: 55
End: 2020-03-25

## 2020-03-25 NOTE — TELEPHONE ENCOUNTER
Number went straight to voicemail. Will need to be rescheduled for [place] for a telephone visit on 03/30/2020.     Melanie Shook MA 3/25/2020  2:42 PM

## 2020-03-25 NOTE — TELEPHONE ENCOUNTER
Reason for Call:  Other phone appointment    Detailed comments: Sunny, Sana's spouse, came to the clinic to let us know that he is out of minutes on his phone and has shut his phone off until 4-8-20. Sana is scheduled for a hospital follow up phone visit on 3-30-20, can this visit be rescheduled to a date after 4-8-20? Sunny also asked if Dr Martinez could address Sana's metoprolol dosage, currently she is taking 12.5 mg in the AM and 12.5 mg in the PM. Sunny will be stopping back at the clinic on 3/26/20 at around 8:00 am to find out if her appointment has been postponed and if her medication dosage has been adjusted. A copy of her after visit summary from [facility] is in Dr Garcia's box.    Phone Number Patient can be reached at: Other phone number:  No phone available*    Best Time:     Can we leave a detailed message on this number? NO    Call taken on 3/25/2020 at 8:41 AM by Aide Borjas

## 2020-03-25 NOTE — TELEPHONE ENCOUNTER
Per Dr. Martinez: patient's telephone hospital follow up can be switched to a different day. Dr. Martinez is not in clinic 4/8/2020. Patient can be scheduled the week of 4/13/2020. As far as the Metoprolol dosage, Dr. Martinez would like her to stay on the 12.5 mg BID. Sunny should monitor BP and pulse.     CINTHIA BeaulieuN, RN  [facility]

## 2020-04-13 ENCOUNTER — VIRTUAL VISIT (OUTPATIENT)
Dept: FAMILY MEDICINE | Facility: CLINIC | Age: 55
End: 2020-04-13
Payer: MEDICARE

## 2020-04-13 DIAGNOSIS — E03.4 HYPOTHYROIDISM DUE TO ACQUIRED ATROPHY OF THYROID: ICD-10-CM

## 2020-04-13 DIAGNOSIS — I10 HYPERTENSION GOAL BP (BLOOD PRESSURE) < 130/80: Primary | ICD-10-CM

## 2020-04-13 PROCEDURE — G2012 BRIEF CHECK IN BY MD/QHP: HCPCS | Performed by: FAMILY MEDICINE

## 2020-04-13 RX ORDER — METOPROLOL TARTRATE 25 MG/1
12.5 TABLET, FILM COATED ORAL 2 TIMES DAILY
Qty: 45 TABLET | Refills: 1 | Status: SHIPPED | OUTPATIENT
Start: 2020-04-13 | End: 2020-06-24

## 2020-04-13 RX ORDER — METOPROLOL TARTRATE 25 MG/1
12.5 TABLET, FILM COATED ORAL
COMMUNITY
Start: 2020-03-24 | End: 2020-04-13

## 2020-04-13 RX ORDER — LEVOTHYROXINE SODIUM 75 UG/1
75 TABLET ORAL DAILY
Qty: 30 TABLET | Refills: 3 | Status: SHIPPED | OUTPATIENT
Start: 2020-04-13 | End: 2020-09-30

## 2020-04-13 NOTE — PROGRESS NOTES
"Sana Ward is a 55 year old female who is being evaluated via a billable telephone visit.      The patient has been notified of following:     \"This telephone visit will be conducted via a call between you and your physician/provider. We have found that certain health care needs can be provided without the need for a physical exam.  This service lets us provide the care you need with a short phone conversation.  If a prescription is necessary we can send it directly to your pharmacy.  If lab work is needed we can place an order for that and you can then stop by our lab to have the test done at a later time.    Telephone visits are billed at different rates depending on your insurance coverage. During this emergency period, for some insurers they may be billed the same as an in-person visit.  Please reach out to your insurance provider with any questions.    If during the course of the call the physician/provider feels a telephone visit is not appropriate, you will not be charged for this service.\"    Patient has given verbal consent for Telephone visit?  Yes    How would you like to obtain your AVS? Mail a copy    Subjective     Sana Ward is a 55 year old female who presents to clinic today for the following health issues:    ED/UC Followup:    Facility:  [facility]  Date of visit: 03/13/2020 and 03/15/2020  Reason for visit: Acute cystitis with Hematuria and Psychosis  Current Status: Better             Diabetes Follow-up      How often are you checking your blood sugar? Not at all    What concerns do you have today about your diabetes? None     Do you have any of these symptoms? (Select all that apply)  No numbness or tingling in feet.  No redness, sores or blisters on feet.  No complaints of excessive thirst.  No reports of blurry vision.  No significant changes to weight.      BP Readings from Last 2 Encounters:   03/13/20 121/88   02/17/20 110/72     Hemoglobin A1C (%)   Date Value   12/13/2019 5.9 " (H)   06/14/2019 5.9 (H)     LDL Cholesterol Calculated (mg/dL)   Date Value   06/14/2019 56   07/12/2018 65         Hypertension Follow-up      Do you check your blood pressure regularly outside of the clinic? No     Are you following a low salt diet? Yes    Are your blood pressures ever more than 140 on the top number (systolic) OR more   than 90 on the bottom number (diastolic), for example 140/90? No    Hypothyroidism Follow-up      Since last visit, patient describes the following symptoms: Weight stable, no hair loss, no skin changes, no constipation, no loose stools      Patient Active Problem List   Diagnosis     Hypertension goal BP (blood pressure) < 130/80     Hyperlipidemia LDL goal <100     Mental health disorder     Advanced directives, counseling/discussion     Generalized anxiety disorder     Type 2 diabetes mellitus without complication (H)     Hypothyroidism due to acquired atrophy of thyroid     Status post ileostomy (H)     Obesity, Class III, BMI 40-49.9 (morbid obesity) (H)     Methamphetamine addiction (H)     Acute exacerbation of chronic paranoid schizophrenia (H)     Paranoid schizophrenia, chronic condition with acute exacerbation (H)     Psychosis (H)     Type 2 diabetes, controlled, with peripheral neuropathy (H)     Bruise of left periocular tissue     Past Surgical History:   Procedure Laterality Date     COLECTOMY  9/11/2001    total colectomy due to Crohn's     GI SURGERY  2003    has ostomy bag      HERNIA REPAIR      over 20 years ago       Social History     Tobacco Use     Smoking status: Never Smoker     Smokeless tobacco: Never Used   Substance Use Topics     Alcohol use: No     Family History   Problem Relation Age of Onset     Diabetes Maternal Grandmother      Diabetes Maternal Grandfather      Breast Cancer Mother          Current Outpatient Medications   Medication Sig Dispense Refill     gabapentin (NEURONTIN) 100 MG capsule Take 100 mg capsule in Morning and 200 mg at  "bedtime. 180 capsule 1     levothyroxine (SYNTHROID/LEVOTHROID) 75 MCG tablet Take 1 tablet (75 mcg) by mouth daily 30 tablet 3     losartan (COZAAR) 50 MG tablet TAKE 1 TABLET BY MOUTH EVERY DAY 90 tablet 1     metFORMIN (GLUCOPHAGE) 500 MG tablet TAKE 1 TABLET BY MOUTH TWICE A DAY WITH MEALS 180 tablet 3     metoprolol tartrate (LOPRESSOR) 25 MG tablet Take 12.5 mg by mouth Half tablet in am and half tablet in pm       order for DME Equipment being ordered: Ostomy Supplies( Premier Drainable Pouch 1.125\") 1 Box 11     order for DME Equipment being ordered: Premier Drainable Pouch Misc    Ostomy Supplies 1 Box 3     order for DME daily Premier Drain Pouch 1.125\" to be used every day as directed. 90 each 3     ORDER FOR DME Equipment being ordered: ostomy supplies  Premier Drainable Pouches MISC. 20 each 6     ORDER FOR DME [place] ostomy bags to be used as directed. 1 Box 6     ORDER FOR DME [employer] Cohesive seals to be used every day as directed. 1 Box 3     Ostomy Supplies (ADAPT BARRIER RING 1-3/16\") MISC USE EVERY DAY AS DIRECTED 10 each 11     Ostomy Supplies (ADAPT) PSTE APPLY TO AFFECTED AREA AS DIRECTED 60 each 1     Ostomy Supplies (PREMIER DRAINABLE) Pouch MISC USE EVERY DAY AS DIRECTED 20 each 11     Ostomy Supplies (SKIN GEL PROTECT DRESSING WIPE) PADS USE EVERY DAY AS DIRECTED 50 each 3     risperiDONE (RISPERDAL) 1 MG tablet Take 1 mg by mouth every morning  5     risperiDONE (RISPERDAL) 3 MG tablet Take 3.0 mg by mouth At Bedtime. 30 tablet 1     sertraline (ZOLOFT) 100 MG tablet Take 1 tablet (100 mg) by mouth daily 90 tablet 1     simvastatin (ZOCOR) 20 MG tablet TAKE 1 TABLET BY MOUTH DAILY AT BEDTIME 90 tablet 1     Skin Protectants, Misc. (NO STING BARRIER FILM) MISC USE EVERY DAY AS DIRECTED 25 each 3     sucralfate (CARAFATE) 1 GM/10ML suspension APPLY A SMALL AMOUNT TO PARA STOMA 1-2 TIMES DAILY **NEEDS OFFICE VISIT FOR ANY FURTHER REFILLS** 100 mL 2     haloperidol (HALDOL) 1 MG tablet Take " 1 mg by mouth as needed As needed for hallucinations, voices       Allergies   Allergen Reactions     Amoxicillin      Contrast Dye      Sulfa Drugs Hives     Recent Labs   Lab Test 03/13/20  1525 01/14/20 12/13/19  1035 06/14/19  1016 12/13/18  1337 07/12/18  0745 09/25/17  1123   A1C  --   --  5.9* 5.9* 6.1*  --  5.9   LDL  --   --   --  56  --  65 37   HDL  --   --   --  41*  --  44* 54   TRIG  --   --   --  131  --  78 71   ALT 15 30  --  14  --  18 15   CR 0.65 0.83  --  0.74  --  0.87 0.71   GFRESTIMATED >90 >60  --  >90  --  68 86   GFRESTBLACK >90  --   --  >90  --  82 >90   POTASSIUM 4.0 3.1*  --  3.9  --  4.0 4.1   TSH  --   --   --   --  5.27*  --  1.99      BP Readings from Last 3 Encounters:   03/13/20 121/88   02/17/20 110/72   12/13/19 112/70    Wt Readings from Last 3 Encounters:   02/17/20 101 kg (222 lb 11.2 oz)   12/13/19 110 kg (242 lb 7 oz)   06/14/19 111.7 kg (246 lb 4 oz)                    Reviewed and updated as needed this visit by Provider  Tobacco  Allergies  Meds  Problems  Med Hx  Surg Hx  Fam Hx         Review of Systems   ROS COMP: Constitutional, HEENT, cardiovascular, pulmonary, gi and gu systems are negative, except as otherwise noted.       Objective   Reported vitals:  There were no vitals taken for this visit.   healthy, alert and no distress  PSYCH: Alert and oriented times 3; coherent speech, normal   rate and volume, able to articulate logical thoughts, able   to abstract reason, no tangential thoughts, no hallucinations   or delusions  Her affect is pleasant  RESP: No cough, no audible wheezing, able to talk in full sentences  Remainder of exam unable to be completed due to telephone visits    Diagnostic Test Results:  Labs reviewed in Epic        Assessment/Plan:  1. Hypothyroidism due to acquired atrophy of thyroid  Chronic stable. The current medical regimen is effective;  continue present plan and medications. Recheck fasting labs in 3 months.  - levothyroxine  (SYNTHROID/LEVOTHROID) 75 MCG tablet; Take 1 tablet (75 mcg) by mouth daily  Dispense: 30 tablet; Refill: 3    2. Hypertension goal BP (blood pressure) < 130/80  Chronic stable. The current medical regimen is effective;  continue present plan and medications.   - metoprolol tartrate (LOPRESSOR) 25 MG tablet; Take 0.5 tablets (12.5 mg) by mouth 2 times daily  Dispense: 45 tablet; Refill: 1    Return in about 3 months (around 7/13/2020) for Lab Work, Routine Visit.      Phone call duration:  5 minutes    Beck Martinez MD

## 2020-05-26 DIAGNOSIS — Z93.2 STATUS POST ILEOSTOMY (H): ICD-10-CM

## 2020-05-26 DIAGNOSIS — E78.5 HYPERLIPIDEMIA LDL GOAL <100: ICD-10-CM

## 2020-05-26 DIAGNOSIS — Z93.2 ILEOSTOMY STATUS (H): ICD-10-CM

## 2020-05-27 RX ORDER — SIMVASTATIN 20 MG
TABLET ORAL
Qty: 90 TABLET | Refills: 1 | OUTPATIENT
Start: 2020-05-27

## 2020-05-27 NOTE — TELEPHONE ENCOUNTER
Refused Prescriptions:                       Disp   Refills    simvastatin (ZOCOR) 20 MG tablet [Pharmacy*90 tab*1        Sig: TAKE 1 TABLET BY MOUTH DAILY AT BEDTIME    Sent 2/11/2020 with 6 month supply. Refill not appropriate at this time.     Nancy Raymundo RN BSN

## 2020-06-23 DIAGNOSIS — Z93.2 STATUS POST ILEOSTOMY (H): ICD-10-CM

## 2020-06-23 DIAGNOSIS — I10 HYPERTENSION GOAL BP (BLOOD PRESSURE) < 130/80: ICD-10-CM

## 2020-06-24 RX ORDER — METOPROLOL TARTRATE 25 MG/1
TABLET, FILM COATED ORAL
Qty: 90 TABLET | Refills: 1 | Status: SHIPPED | OUTPATIENT
Start: 2020-06-24 | End: 2021-03-04

## 2020-06-24 RX ORDER — OSTOMY SUPPLY
PASTE (GRAM) MISCELLANEOUS
Qty: 60 EACH | Refills: 11 | Status: SHIPPED | OUTPATIENT
Start: 2020-06-24

## 2020-06-24 NOTE — TELEPHONE ENCOUNTER
[employer]    Last Written Prescription Date:  3/12/20  Last Fill Quantity: 60,  # refills: 1   Last office visit: 4/13/20 with prescribing provider:  Juan   Future Office Visit:      Routing refill request to provider for review/approval because:  Drug not on the [facility] refill protocol     Maday Rashid RN on 6/24/2020 at 2:25 PM

## 2020-06-24 NOTE — TELEPHONE ENCOUNTER
[place] [facility] [place]    Maday Rashid RN on 6/24/2020 at 2:23 PM

## 2020-06-26 DIAGNOSIS — I10 HYPERTENSION GOAL BP (BLOOD PRESSURE) < 130/80: Primary | ICD-10-CM

## 2020-06-26 RX ORDER — LOSARTAN POTASSIUM 25 MG/1
TABLET ORAL
Qty: 90 TABLET | Refills: 0 | Status: SHIPPED | OUTPATIENT
Start: 2020-06-26 | End: 2020-08-27

## 2020-06-26 NOTE — TELEPHONE ENCOUNTER
Prescription approved per [facility] Refill Protocol.    CINTHIA BeaulieuN, RN  [facility]

## 2020-07-13 ENCOUNTER — APPOINTMENT (OUTPATIENT)
Dept: CT IMAGING | Facility: CLINIC | Age: 55
End: 2020-07-13
Attending: NURSE PRACTITIONER
Payer: MEDICARE

## 2020-07-13 ENCOUNTER — HOSPITAL ENCOUNTER (EMERGENCY)
Facility: CLINIC | Age: 55
Discharge: OTHER INSTITUTION | End: 2020-07-13
Attending: NURSE PRACTITIONER | Admitting: NURSE PRACTITIONER
Payer: MEDICARE

## 2020-07-13 ENCOUNTER — TRANSFERRED RECORDS (OUTPATIENT)
Dept: HEALTH INFORMATION MANAGEMENT | Facility: CLINIC | Age: 55
End: 2020-07-13

## 2020-07-13 ENCOUNTER — APPOINTMENT (OUTPATIENT)
Dept: GENERAL RADIOLOGY | Facility: CLINIC | Age: 55
End: 2020-07-13
Attending: EMERGENCY MEDICINE
Payer: MEDICARE

## 2020-07-13 VITALS
DIASTOLIC BLOOD PRESSURE: 59 MMHG | HEART RATE: 116 BPM | WEIGHT: 227 LBS | RESPIRATION RATE: 12 BRPM | TEMPERATURE: 97 F | SYSTOLIC BLOOD PRESSURE: 95 MMHG | BODY MASS INDEX: 40.21 KG/M2 | OXYGEN SATURATION: 100 %

## 2020-07-13 DIAGNOSIS — N17.9 ACUTE KIDNEY INJURY (H): ICD-10-CM

## 2020-07-13 DIAGNOSIS — A41.9 SEVERE SEPSIS (H): ICD-10-CM

## 2020-07-13 DIAGNOSIS — E87.1 HYPONATREMIA: ICD-10-CM

## 2020-07-13 DIAGNOSIS — R65.20 SEVERE SEPSIS (H): ICD-10-CM

## 2020-07-13 DIAGNOSIS — G93.40 ACUTE ENCEPHALOPATHY: ICD-10-CM

## 2020-07-13 DIAGNOSIS — E87.5 HYPERKALEMIA: ICD-10-CM

## 2020-07-13 LAB
ALBUMIN SERPL-MCNC: 2.4 G/DL (ref 3.4–5)
ALBUMIN UR-MCNC: NEGATIVE MG/DL
ALP SERPL-CCNC: 69 U/L (ref 40–150)
ALT SERPL W P-5'-P-CCNC: 16 U/L (ref 0–50)
AMORPH CRY #/AREA URNS HPF: ABNORMAL /HPF
AMPHETAMINES UR QL: NOT DETECTED NG/ML
ANION GAP SERPL CALCULATED.3IONS-SCNC: 9 MMOL/L (ref 3–14)
APPEARANCE UR: ABNORMAL
AST SERPL W P-5'-P-CCNC: 19 U/L (ref 0–45)
BACTERIA #/AREA URNS HPF: ABNORMAL /HPF
BARBITURATES UR QL SCN: NOT DETECTED NG/ML
BASE DEFICIT BLDV-SCNC: 12.2 MMOL/L
BASOPHILS # BLD AUTO: 0 10E9/L (ref 0–0.2)
BASOPHILS NFR BLD AUTO: 0.1 %
BENZODIAZ UR QL SCN: NOT DETECTED NG/ML
BILIRUB SERPL-MCNC: 0.3 MG/DL (ref 0.2–1.3)
BILIRUB UR QL STRIP: NEGATIVE
BUN SERPL-MCNC: 58 MG/DL (ref 7–30)
BUPRENORPHINE UR QL: NOT DETECTED NG/ML
CALCIUM SERPL-MCNC: 7.1 MG/DL (ref 8.5–10.1)
CANNABINOIDS UR QL: NOT DETECTED NG/ML
CHLORIDE SERPL-SCNC: 90 MMOL/L (ref 94–109)
CO2 SERPL-SCNC: 16 MMOL/L (ref 20–32)
COCAINE UR QL SCN: NOT DETECTED NG/ML
COLOR UR AUTO: YELLOW
CREAT SERPL-MCNC: 1.55 MG/DL (ref 0.52–1.04)
D-METHAMPHET UR QL: NOT DETECTED NG/ML
DIFFERENTIAL METHOD BLD: ABNORMAL
EOSINOPHIL NFR BLD AUTO: 0.1 %
ERYTHROCYTE [DISTWIDTH] IN BLOOD BY AUTOMATED COUNT: 14.6 % (ref 10–15)
GFR SERPL CREATININE-BSD FRML MDRD: 37 ML/MIN/{1.73_M2}
GLUCOSE SERPL-MCNC: 198 MG/DL (ref 70–99)
GLUCOSE UR STRIP-MCNC: NEGATIVE MG/DL
HCO3 BLDV-SCNC: 14 MMOL/L (ref 21–28)
HCT VFR BLD AUTO: 24.4 % (ref 35–47)
HGB BLD-MCNC: 8.7 G/DL (ref 11.7–15.7)
HGB UR QL STRIP: ABNORMAL
HYALINE CASTS #/AREA URNS LPF: 27 /LPF (ref 0–2)
IMM GRANULOCYTES # BLD: 0.7 10E9/L (ref 0–0.4)
IMM GRANULOCYTES NFR BLD: 3.5 %
KETONES UR STRIP-MCNC: NEGATIVE MG/DL
LACTATE BLD-SCNC: 3.9 MMOL/L (ref 0.7–2)
LEUKOCYTE ESTERASE UR QL STRIP: ABNORMAL
LYMPHOCYTES # BLD AUTO: 0.9 10E9/L (ref 0.8–5.3)
LYMPHOCYTES NFR BLD AUTO: 4.7 %
MAGNESIUM SERPL-MCNC: 1.9 MG/DL (ref 1.6–2.3)
MCH RBC QN AUTO: 28.7 PG (ref 26.5–33)
MCHC RBC AUTO-ENTMCNC: 35.7 G/DL (ref 31.5–36.5)
MCV RBC AUTO: 81 FL (ref 78–100)
METHADONE UR QL SCN: NOT DETECTED NG/ML
MONOCYTES # BLD AUTO: 1.7 10E9/L (ref 0–1.3)
MONOCYTES NFR BLD AUTO: 8.9 %
MUCOUS THREADS #/AREA URNS LPF: PRESENT /LPF
NEUTROPHILS # BLD AUTO: 15.8 10E9/L (ref 1.6–8.3)
NEUTROPHILS NFR BLD AUTO: 82.7 %
NITRATE UR QL: NEGATIVE
NRBC # BLD AUTO: 0 10*3/UL
NRBC BLD AUTO-RTO: 0 /100
O2/TOTAL GAS SETTING VFR VENT: ABNORMAL %
OPIATES UR QL SCN: NOT DETECTED NG/ML
OXYCODONE UR QL SCN: NOT DETECTED NG/ML
PCO2 BLDV: 31 MM HG (ref 40–50)
PCP UR QL SCN: NOT DETECTED NG/ML
PH BLDV: 7.26 PH (ref 7.32–7.43)
PH UR STRIP: 5 PH (ref 5–7)
PLATELET # BLD AUTO: 323 10E9/L (ref 150–450)
PO2 BLDV: 39 MM HG (ref 25–47)
POTASSIUM SERPL-SCNC: 5.8 MMOL/L (ref 3.4–5.3)
PROPOXYPH UR QL: NOT DETECTED NG/ML
PROT SERPL-MCNC: 5.4 G/DL (ref 6.8–8.8)
RBC # BLD AUTO: 3.03 10E12/L (ref 3.8–5.2)
RBC #/AREA URNS AUTO: <1 /HPF (ref 0–2)
SARS-COV-2 PCR COMMENT: NORMAL
SARS-COV-2 RNA SPEC QL NAA+PROBE: NEGATIVE
SARS-COV-2 RNA SPEC QL NAA+PROBE: NORMAL
SODIUM SERPL-SCNC: 115 MMOL/L (ref 133–144)
SOURCE: ABNORMAL
SP GR UR STRIP: 1.01 (ref 1–1.03)
SPECIMEN SOURCE: NORMAL
SPECIMEN SOURCE: NORMAL
SQUAMOUS #/AREA URNS AUTO: <1 /HPF (ref 0–1)
TRICYCLICS UR QL SCN: NOT DETECTED NG/ML
TROPONIN I SERPL-MCNC: <0.015 UG/L (ref 0–0.04)
TSH SERPL DL<=0.005 MIU/L-ACNC: 3.22 MU/L (ref 0.4–4)
UROBILINOGEN UR STRIP-MCNC: 0 MG/DL (ref 0–2)
WBC # BLD AUTO: 19.1 10E9/L (ref 4–11)
WBC #/AREA URNS AUTO: 12 /HPF (ref 0–5)

## 2020-07-13 PROCEDURE — 87186 SC STD MICRODIL/AGAR DIL: CPT | Performed by: NURSE PRACTITIONER

## 2020-07-13 PROCEDURE — 99291 CRITICAL CARE FIRST HOUR: CPT | Mod: 25 | Performed by: NURSE PRACTITIONER

## 2020-07-13 PROCEDURE — U0003 INFECTIOUS AGENT DETECTION BY NUCLEIC ACID (DNA OR RNA); SEVERE ACUTE RESPIRATORY SYNDROME CORONAVIRUS 2 (SARS-COV-2) (CORONAVIRUS DISEASE [COVID-19]), AMPLIFIED PROBE TECHNIQUE, MAKING USE OF HIGH THROUGHPUT TECHNOLOGIES AS DESCRIBED BY CMS-2020-01-R: HCPCS | Performed by: NURSE PRACTITIONER

## 2020-07-13 PROCEDURE — 99285 EMERGENCY DEPT VISIT HI MDM: CPT | Mod: 25 | Performed by: NURSE PRACTITIONER

## 2020-07-13 PROCEDURE — 83605 ASSAY OF LACTIC ACID: CPT | Performed by: NURSE PRACTITIONER

## 2020-07-13 PROCEDURE — 84484 ASSAY OF TROPONIN QUANT: CPT | Performed by: NURSE PRACTITIONER

## 2020-07-13 PROCEDURE — 99292 CRITICAL CARE ADDL 30 MIN: CPT | Mod: 25 | Performed by: NURSE PRACTITIONER

## 2020-07-13 PROCEDURE — 80306 DRUG TEST PRSMV INSTRMNT: CPT | Performed by: NURSE PRACTITIONER

## 2020-07-13 PROCEDURE — 93005 ELECTROCARDIOGRAM TRACING: CPT | Mod: 59 | Performed by: NURSE PRACTITIONER

## 2020-07-13 PROCEDURE — 96367 TX/PROPH/DG ADDL SEQ IV INF: CPT | Performed by: NURSE PRACTITIONER

## 2020-07-13 PROCEDURE — 84443 ASSAY THYROID STIM HORMONE: CPT | Performed by: NURSE PRACTITIONER

## 2020-07-13 PROCEDURE — 25000128 H RX IP 250 OP 636: Performed by: NURSE PRACTITIONER

## 2020-07-13 PROCEDURE — 87086 URINE CULTURE/COLONY COUNT: CPT | Performed by: NURSE PRACTITIONER

## 2020-07-13 PROCEDURE — 71045 X-RAY EXAM CHEST 1 VIEW: CPT | Mod: TC

## 2020-07-13 PROCEDURE — 70450 CT HEAD/BRAIN W/O DYE: CPT

## 2020-07-13 PROCEDURE — 82803 BLOOD GASES ANY COMBINATION: CPT | Performed by: NURSE PRACTITIONER

## 2020-07-13 PROCEDURE — 36556 INSERT NON-TUNNEL CV CATH: CPT | Mod: Z6 | Performed by: NURSE PRACTITIONER

## 2020-07-13 PROCEDURE — 96365 THER/PROPH/DIAG IV INF INIT: CPT | Mod: 59 | Performed by: NURSE PRACTITIONER

## 2020-07-13 PROCEDURE — 25800030 ZZH RX IP 258 OP 636: Performed by: NURSE PRACTITIONER

## 2020-07-13 PROCEDURE — 87088 URINE BACTERIA CULTURE: CPT | Performed by: NURSE PRACTITIONER

## 2020-07-13 PROCEDURE — 36415 COLL VENOUS BLD VENIPUNCTURE: CPT | Performed by: NURSE PRACTITIONER

## 2020-07-13 PROCEDURE — 80053 COMPREHEN METABOLIC PANEL: CPT | Performed by: NURSE PRACTITIONER

## 2020-07-13 PROCEDURE — 85025 COMPLETE CBC W/AUTO DIFF WBC: CPT | Performed by: NURSE PRACTITIONER

## 2020-07-13 PROCEDURE — 96361 HYDRATE IV INFUSION ADD-ON: CPT | Mod: 59 | Performed by: NURSE PRACTITIONER

## 2020-07-13 PROCEDURE — 36556 INSERT NON-TUNNEL CV CATH: CPT | Performed by: NURSE PRACTITIONER

## 2020-07-13 PROCEDURE — 81001 URINALYSIS AUTO W/SCOPE: CPT | Performed by: NURSE PRACTITIONER

## 2020-07-13 PROCEDURE — 96368 THER/DIAG CONCURRENT INF: CPT | Mod: 59 | Performed by: NURSE PRACTITIONER

## 2020-07-13 PROCEDURE — 83735 ASSAY OF MAGNESIUM: CPT | Performed by: NURSE PRACTITIONER

## 2020-07-13 PROCEDURE — 96366 THER/PROPH/DIAG IV INF ADDON: CPT | Performed by: NURSE PRACTITIONER

## 2020-07-13 PROCEDURE — 25000125 ZZHC RX 250: Performed by: NURSE PRACTITIONER

## 2020-07-13 PROCEDURE — C9803 HOPD COVID-19 SPEC COLLECT: HCPCS | Performed by: NURSE PRACTITIONER

## 2020-07-13 PROCEDURE — 87040 BLOOD CULTURE FOR BACTERIA: CPT | Mod: XU | Performed by: NURSE PRACTITIONER

## 2020-07-13 PROCEDURE — 93010 ELECTROCARDIOGRAM REPORT: CPT | Mod: 59 | Performed by: NURSE PRACTITIONER

## 2020-07-13 RX ORDER — SODIUM CHLORIDE 9 MG/ML
INJECTION, SOLUTION INTRAVENOUS CONTINUOUS
Status: DISCONTINUED | OUTPATIENT
Start: 2020-07-13 | End: 2020-07-13 | Stop reason: HOSPADM

## 2020-07-13 RX ADMIN — SODIUM CHLORIDE: 9 INJECTION, SOLUTION INTRAVENOUS at 13:57

## 2020-07-13 RX ADMIN — NOREPINEPHRINE BITARTRATE 0.1 MCG/KG/MIN: 1 INJECTION, SOLUTION, CONCENTRATE INTRAVENOUS at 15:04

## 2020-07-13 RX ADMIN — NOREPINEPHRINE BITARTRATE 0.13 MCG/KG/MIN: 1 INJECTION, SOLUTION, CONCENTRATE INTRAVENOUS at 16:54

## 2020-07-13 RX ADMIN — SODIUM CHLORIDE 1000 ML: 9 INJECTION, SOLUTION INTRAVENOUS at 12:56

## 2020-07-13 RX ADMIN — VANCOMYCIN HYDROCHLORIDE 2000 MG: 10 INJECTION, POWDER, LYOPHILIZED, FOR SOLUTION INTRAVENOUS at 14:58

## 2020-07-13 RX ADMIN — NOREPINEPHRINE BITARTRATE 0.03 MCG/KG/MIN: 1 INJECTION, SOLUTION, CONCENTRATE INTRAVENOUS at 14:28

## 2020-07-13 RX ADMIN — CEFEPIME HYDROCHLORIDE 2 G: 2 INJECTION, POWDER, FOR SOLUTION INTRAVENOUS at 14:22

## 2020-07-13 NOTE — ED PROVIDER NOTES
History     Chief Complaint   Patient presents with     Fall     HPI  Sana Ward is a 55 year old female who presents to the ED today via EMS after falling in the shower.  Patient's  heard her fall and found her on the ground on her knees, he was unable to help her up by himself so he called 911 and on police arrival they struggle to get patient to a sitting position on a nearby toilet where she continued to seem to be drifting in and out of consciousness.  Patient was disoriented on EMS arrival, she arrives here with bruised knees, small contusion to her right parietal region.  Patient denies any pain.  Patient is aware of where she is in the month, states it is 2000 but knows the president.  Patient denies any preceding illness but uncertain as to how accurate of a historian she is, she does arrive hypotensive and pale.  According to EMS  reports that there has been no recent illness or other concerns.    Allergies:  Allergies   Allergen Reactions     Amoxicillin      Contrast Dye      Sulfa Drugs Hives       Problem List:    Patient Active Problem List    Diagnosis Date Noted     Bruise of left periocular tissue 02/17/2020     Priority: Medium     Type 2 diabetes, controlled, with peripheral neuropathy (H) 04/24/2019     Priority: Medium     Obesity, Class III, BMI 40-49.9 (morbid obesity) (H) 10/28/2015     Priority: Medium     Status post ileostomy (H) 01/31/2014     Priority: Medium     Hypothyroidism due to acquired atrophy of thyroid 04/19/2013     Priority: Medium     Methamphetamine addiction (H) 08/01/2012     Priority: Medium     Acute exacerbation of chronic paranoid schizophrenia (H) 08/01/2012     Priority: Medium     Paranoid schizophrenia, chronic condition with acute exacerbation (H) 08/01/2012     Priority: Medium     Psychosis (H) 08/01/2012     Priority: Medium     Generalized anxiety disorder 06/15/2012     Priority: Medium     Diagnosis updated by automated process.  Provider to review and confirm.       Type 2 diabetes mellitus without complication (H) 06/15/2012     Priority: Medium     Advanced directives, counseling/discussion 11/03/2011     Priority: Medium     Advance Care Planning 6/24/2015: Receipt of ACP document:  Received: Health Care Directive which was witnessed or notarized on 6/19/15.  Document not previously scanned.  Validation form completed and sent with document to be scanned.  Code Status reflects choices in most recent ACP document.  Confirmed/documented designated decision maker(s).  Added by Jimbo Fernández            Advance Care Planning:   Receipt of ACP document:  Received: Health Care Directive which was witnessed or notarized on 8-.  Document not previously scanned.  Validation form completed and sent with document to be scanned.  Confirmed/documented designated decision maker(s). See permanent comments section of demographics in clinical tab. View document(s) and details by clicking on code status.   Added by Yesica Betts on 9/8/2014.       Mental health disorder 10/17/2011     Priority: Medium     Hypertension goal BP (blood pressure) < 130/80 05/02/2011     Priority: Medium     Hyperlipidemia LDL goal <100 05/02/2011     Priority: Medium        Past Medical History:    Past Medical History:   Diagnosis Date     Delirium 11/19/11     Diabetes mellitus, type 2 (H) 6/15/2012     Diabetic eye exam (H) 5/12/14     Generalised anxiety disorder 6/15/2012     Hyperthyroidism      Obesity, Class III, BMI 40-49.9 (morbid obesity) (H) 10/28/2015     Post traumatic stress disorder        Past Surgical History:    Past Surgical History:   Procedure Laterality Date     COLECTOMY  9/11/2001    total colectomy due to Crohn's     GI SURGERY  2003    has ostomy bag      HERNIA REPAIR      over 20 years ago       Family History:    Family History   Problem Relation Age of Onset     Diabetes Maternal Grandmother      Diabetes Maternal Grandfather      Breast  "Cancer Mother        Social History:  Marital Status:   [2]  Social History     Tobacco Use     Smoking status: Never Smoker     Smokeless tobacco: Never Used   Substance Use Topics     Alcohol use: No     Drug use: No        Medications:    gabapentin (NEURONTIN) 100 MG capsule  haloperidol (HALDOL) 1 MG tablet  levothyroxine (SYNTHROID/LEVOTHROID) 75 MCG tablet  losartan (COZAAR) 25 MG tablet  losartan (COZAAR) 50 MG tablet  metFORMIN (GLUCOPHAGE) 500 MG tablet  metoprolol tartrate (LOPRESSOR) 25 MG tablet  order for DME  order for DME  order for DME  ORDER FOR DME  ORDER FOR DME  ORDER FOR DME  Ostomy Supplies (ADAPT BARRIER RING 1-3/16\") MISC  Ostomy Supplies (ADAPT) PSTE  Ostomy Supplies (PREMIER DRAINABLE) Pouch MISC  Ostomy Supplies (SKIN GEL PROTECT DRESSING WIPE) PADS  risperiDONE (RISPERDAL) 1 MG tablet  risperiDONE (RISPERDAL) 3 MG tablet  sertraline (ZOLOFT) 100 MG tablet  simvastatin (ZOCOR) 20 MG tablet  Skin Protectants, Misc. (NO STING BARRIER FILM) MISC  sucralfate (CARAFATE) 1 GM/10ML suspension          Review of Systems   All other systems reviewed and are negative.      Physical Exam   BP: (!) 117/103(L) arm, large cuff)  Pulse: 114  Temp: 97  F (36.1  C)  Resp: 16  Weight: 103 kg (227 lb)(Bed scale)  SpO2: 100 %      Physical Exam  Vitals signs reviewed.   Constitutional:       Appearance: She is obese. She is ill-appearing.   HENT:      Head: Normocephalic.      Comments: Small contusion to right parietal region     Right Ear: Tympanic membrane normal.      Left Ear: Tympanic membrane normal.      Mouth/Throat:      Mouth: Mucous membranes are moist.   Eyes:      Extraocular Movements: Extraocular movements intact.   Neck:      Musculoskeletal: Normal range of motion and neck supple.   Cardiovascular:      Rate and Rhythm: Tachycardia present.      Pulses: Normal pulses.   Pulmonary:      Effort: Pulmonary effort is normal.      Comments: Diminished breath sounds throughout  Abdominal: "      General: Bowel sounds are normal.      Palpations: Abdomen is soft.   Musculoskeletal: Normal range of motion.   Skin:     Capillary Refill: Capillary refill takes less than 2 seconds.      Coloration: Skin is pale.      Findings: Bruising: bilateral knees.   Neurological:      General: No focal deficit present.      Mental Status: She is alert. She is confused.      Cranial Nerves: Cranial nerves are intact.      Motor: Motor function is intact.         ED Course    1325  Patient's initial blood pressure on arrival was 117, this trended down to 60 systolic even with patient in Trendelenburg so central line was placed, Dr. Gatica at bedside to assist.    Patient tolerated procedure well without complication, please refer to procedure note, will initiate Levophed.       [facility]    -Central Line    Date/Time: 7/13/2020 1:37 PM  Performed by: Mera Kruse APRN CNP  Authorized by: Mera Kruse APRN CNP        ANESTHESIA    Anesthesia: Local infiltration  Local Anesthetic:  Lidocaine 1% without epinephrine      PRE-PROCEDURE DETAILS:     Hand hygiene: Hand hygiene performed prior to insertion      Sterile barrier technique: All elements of maximal sterile technique followed      Skin preparation:  ChloraPrep    Skin preparation agent: Skin preparation agent completely dried prior to procedure      PROCEDURE DETAILS:     Location:  R internal jugular    Patient position:  Trendelenburg    Procedural supplies:  Cordis and triple lumen    Catheter size:  7 Fr    Landmarks identified: yes      Ultrasound guidance: yes      Sterile ultrasound techniques: Sterile gel and sterile probe covers were used      Number of attempts:  1    Successful placement: yes      POST PROCEDURE DETAILS:      Post-procedure:  Dressing applied and line sutured    Assessment:  Blood return through all ports, no pneumothorax on x-ray, free fluid flow and placement verified by x-ray  PROCEDURE    Patient Tolerance:  Patient tolerated the procedure well with no immediate complications             EKG Interpretation:      Interpreted by SHEELA Chen CNP  Time reviewed: 1251  Symptoms at time of EKG: Fall, confusion, ? syncope   Rhythm: Sinus tachycardia  Rate: 112  Axis: Normal  Ectopy: None  Conduction: Normal  ST Segments/ T Waves: No ST-T wave changes and No acute ischemic changes  Q Waves: Nonspecific  Comparison to prior: No old EKG available  Clinical Impression: no acute changes and non-specific EKG    The patient has signs of Severe Sepsis REMINDER: Please use septic shock SmartPhrase for Lactate > 4 or a patient requiring vasopressors after initial fluid bolus (meaning persistent hypotension)      If one the following conditions is present, a 30 mL/kg bolus is recommended as part of the 6 hour bundle (IBW can be used for BMI >30, or document refusal/contraindication):      1.   Initial hypotension  defined as 2 bps < 90 or map < 65 in the 6hrs before or 6hrs after time zero.     2.  Lactate >4.     The patient has signs of Severe Sepsis as evidenced by:    1. 2 SIRS criteria, AND  2. Suspected infection, AND   3. Organ dysfunction:  SBP <90, MAP < 65, or SBP decrease of >40 from baseline due to infection, Lactic Acid > 2.0 and Acute encephalopathy due to sepsis    Time severe sepsis diagnosis confirmed: 1333 07/13/20 as this was the time when Lactate resulted, and the level was > 2.0    3 Hour Severe Sepsis Bundle Completion:    1. Initial Lactic Acid Result:   Recent Labs   Lab Test 07/13/20  1333 03/13/20  1525   LACT 3.9* 0.9     2. Blood Cultures before Antibiotics: Yes  3. Broad Spectrum Antibiotics Administered:  yes       Anti-infectives (From admission through now)    Start     Dose/Rate Route Frequency Ordered Stop    07/13/20 1430  vancomycin (VANCOCIN) 2,000 mg in sodium chloride 0.9 % 500 mL intermittent infusion      2,000 mg  over 2 Hours Intravenous EVERY 12 HOURS  07/13/20 1355      07/13/20 1346  ceFEPIme (MAXIPIME) 2 g vial to attach to  ml bag for ADULTS or 50 ml bag for PEDS      2 g  over 30 Minutes Intravenous ONCE 07/13/20 1346            4. Fluid volume administered in ED:  Obese patient (BMI >30); 30 mL/kg bolus based on IBW given (see amount below).    BMI Readings from Last 1 Encounters:   07/13/20 40.21 kg/m      30 mL/kg fluids based on weight: 3,090 mL  30 mL/kg fluids based on IBW (must be >= 60 inches tall): 1,570 mL                 Results for orders placed or performed during the hospital encounter of 07/13/20 (from the past 24 hour(s))   CBC with platelets differential   Result Value Ref Range    WBC 19.1 (H) 4.0 - 11.0 10e9/L    RBC Count 3.03 (L) 3.8 - 5.2 10e12/L    Hemoglobin 8.7 (L) 11.7 - 15.7 g/dL    Hematocrit 24.4 (L) 35.0 - 47.0 %    MCV 81 78 - 100 fl    MCH 28.7 26.5 - 33.0 pg    MCHC 35.7 31.5 - 36.5 g/dL    RDW 14.6 10.0 - 15.0 %    Platelet Count 323 150 - 450 10e9/L    Diff Method Automated Method     % Neutrophils 82.7 %    % Lymphocytes 4.7 %    % Monocytes 8.9 %    % Eosinophils 0.1 %    % Basophils 0.1 %    % Immature Granulocytes 3.5 %    Nucleated RBCs 0 0 /100    Absolute Neutrophil 15.8 (H) 1.6 - 8.3 10e9/L    Absolute Lymphocytes 0.9 0.8 - 5.3 10e9/L    Absolute Monocytes 1.7 (H) 0.0 - 1.3 10e9/L    Absolute Basophils 0.0 0.0 - 0.2 10e9/L    Abs Immature Granulocytes 0.7 (H) 0 - 0.4 10e9/L    Absolute Nucleated RBC 0.0    Comprehensive metabolic panel   Result Value Ref Range    Sodium 115 (LL) 133 - 144 mmol/L    Potassium 5.8 (H) 3.4 - 5.3 mmol/L    Chloride 90 (L) 94 - 109 mmol/L    Carbon Dioxide 16 (L) 20 - 32 mmol/L    Anion Gap 9 3 - 14 mmol/L    Glucose 198 (H) 70 - 99 mg/dL    Urea Nitrogen 58 (H) 7 - 30 mg/dL    Creatinine 1.55 (H) 0.52 - 1.04 mg/dL    GFR Estimate 37 (L) >60 mL/min/[1.73_m2]    GFR Estimate If Black 43 (L) >60 mL/min/[1.73_m2]    Calcium 7.1 (L) 8.5 - 10.1 mg/dL    Bilirubin Total 0.3 0.2 - 1.3  mg/dL    Albumin 2.4 (L) 3.4 - 5.0 g/dL    Protein Total 5.4 (L) 6.8 - 8.8 g/dL    Alkaline Phosphatase 69 40 - 150 U/L    ALT 16 0 - 50 U/L    AST 19 0 - 45 U/L   Lactic acid whole blood   Result Value Ref Range    Lactic Acid 3.9 (H) 0.7 - 2.0 mmol/L   Troponin I   Result Value Ref Range    Troponin I ES <0.015 0.000 - 0.045 ug/L   Blood gas venous   Result Value Ref Range    Ph Venous 7.26 (L) 7.32 - 7.43 pH    PCO2 Venous 31 (L) 40 - 50 mm Hg    PO2 Venous 39 25 - 47 mm Hg    Bicarbonate Venous 14 (L) 21 - 28 mmol/L    Base Deficit Venous 12.2 mmol/L    FIO2 21%    Magnesium   Result Value Ref Range    Magnesium 1.9 1.6 - 2.3 mg/dL   Blood culture ONE site    Specimen: Blood   Result Value Ref Range    Specimen Description Blood     Culture Micro No growth after 1 hour    TSH with free T4 reflex   Result Value Ref Range    TSH 3.22 0.40 - 4.00 mU/L   XR Chest Port 1 View    Narrative    CHEST PORTABLE ONE VIEW July 13, 2020 1:49 PM     HISTORY: Hypoxia.     COMPARISON: None available    FINDINGS: Right central line tip is at the lower SVC level. No  pneumothorax.      Impression    IMPRESSION: No acute pulmonary disease.    HEATH MORAN MD   CT Head w/o Contrast    Narrative    CT SCAN OF THE HEAD WITHOUT CONTRAST   7/13/2020 2:17 PM     HISTORY: Altered level of consciousness (LOC), altered mental status,  unexplained.    TECHNIQUE:  Axial images of the head and coronal reformations without  IV contrast material. Radiation dose for this scan was reduced using  automated exposure control, adjustment of the mA and/or kV according  to patient size, or iterative reconstruction technique.    COMPARISON: None.    FINDINGS:  Mild volume loss is present. White matter hypoattenuation  likely represents mild chronic small vessel ischemic change. The  cerebral hemispheres, brainstem, and cerebellum otherwise demonstrate  normal morphology and attenuation. No evidence of acute ischemia,  hemorrhage, mass, mass effect or  hydrocephalus. The visualized  calvarium, tympanic cavities, mastoid cavities, and paranasal sinuses  are unremarkable.      Impression    IMPRESSION:   No acute intracranial abnormality.    MATHEUS AVILES MD   UA with Microscopic   Result Value Ref Range    Color Urine Yellow     Appearance Urine Cloudy     Glucose Urine Negative NEG^Negative mg/dL    Bilirubin Urine Negative NEG^Negative    Ketones Urine Negative NEG^Negative mg/dL    Specific Gravity Urine 1.011 1.003 - 1.035    Blood Urine Small (A) NEG^Negative    pH Urine 5.0 5.0 - 7.0 pH    Protein Albumin Urine Negative NEG^Negative mg/dL    Urobilinogen mg/dL 0.0 0.0 - 2.0 mg/dL    Nitrite Urine Negative NEG^Negative    Leukocyte Esterase Urine Trace (A) NEG^Negative    Source Catheterized Urine     WBC Urine 12 (H) 0 - 5 /HPF    RBC Urine <1 0 - 2 /HPF    Bacteria Urine Few (A) NEG^Negative /HPF    Squamous Epithelial /HPF Urine <1 0 - 1 /HPF    Mucous Urine Present (A) NEG^Negative /LPF    Hyaline Casts 27 (H) 0 - 2 /LPF    Amorphous Crystals Few (A) NEG^Negative /HPF   Urine Drugs of Abuse Screen Panel 13   Result Value Ref Range    Cannabinoids (54-nih-6-carboxy-9-THC) Not Detected NDET^Not Detected ng/mL    Phencyclidine (Phencyclidine) Not Detected NDET^Not Detected ng/mL    Cocaine (Benzoylecgonine) Not Detected NDET^Not Detected ng/mL    Methamphetamine (d-Methamphetamine) Not Detected NDET^Not Detected ng/mL    Opiates (Morphine) Not Detected NDET^Not Detected ng/mL    Amphetamine (d-Amphetamine) Not Detected NDET^Not Detected ng/mL    Benzodiazepines (Nordiazepam) Not Detected NDET^Not Detected ng/mL    Tricyclic Antidepressants (Desipramine) Not Detected NDET^Not Detected ng/mL    Methadone (Methadone) Not Detected NDET^Not Detected ng/mL    Barbiturates (Butalbital) Not Detected NDET^Not Detected ng/mL    Oxycodone (Oxycodone) Not Detected NDET^Not Detected ng/mL    Propoxyphene (Norpropoxyphene) Not Detected NDET^Not Detected ng/mL     Buprenorphine (Buprenorphine) Not Detected NDET^Not Detected ng/mL       Medications   norepinephrine (LEVOPHED) 16 mg in sodium chloride 0.9 % 250 mL infusion (0.1 mcg/kg/min × 103 kg Intravenous New Bag 7/13/20 1504)   0.9% sodium chloride BOLUS (0 mLs Intravenous Stopped 7/13/20 1557)   sodium chloride 0.9% infusion ( Intravenous New Bag 7/13/20 1357)   vancomycin (VANCOCIN) 2,000 mg in sodium chloride 0.9 % 500 mL intermittent infusion (2,000 mg Intravenous New Bag 7/13/20 1458)   0.9% sodium chloride BOLUS (0 mLs Intravenous Stopped 7/13/20 1357)   ceFEPIme (MAXIPIME) 2 g vial to attach to  ml bag for ADULTS or 50 ml bag for PEDS (0 g Intravenous Stopped 7/13/20 1522)       Assessments & Plan (with Medical Decision Making)  Sana is a 55-year-old female, history of paranoid schizophrenia, generalized anxiety, hypothyroidism, ileostomy, type 2 diabetes, presents to the emergency room today via EMS after falling in the shower.  Please refer to HPI and focused exam.  Patient arrives here pale, tachycardic and significantly hypotensive, concerns for sepsis, certainly this may be intracranial related but less likely given the rest of her exam findings today.  Peripheral IVs were established by nursing staff and 2 L of normal saline were initiated, patient remained hypotensive and no blood work had been obtained so decision was made to put in an emergent central line, see procedure note above.  Levophed was started.  Blood work is consistent with severe sepsis with a white count of 19,000, lactic acid of 3.9.  Patient also hyponatremic with a sodium of 115, hemoglobin is down to 8.7.  Patient does have an acute kidney injury with creatinine of 1.55, BUN of 58, GFR of 37, glucose is 198 on panel, patient does have hyperkalemia with potassium of 5.8.  Carbon dioxide is 16, pH of 7.26, bicarb of 14 consistent with metabolic acidosis.  Thyroid function is stable, troponin is undetectable, EKG is negative for any  acute ischemic findings magnesium is normal at 1.9.  Chest x-ray confirms central line placement, no lobar infiltrate to indicate pneumonia.  COVID swab is pending but I do not feel this is likely.  Urinalysis does return with trace leukocyte esterase and 12 white cells, culture is pending, I am not convinced that this is the source of patient's sepsis at this time.  Patient was started on Zosyn and vancomycin immediately after her lactic acid returned elevated.  She remains with acute encephalopathy,  is at bedside and updated on plan of care, unfortunately we are unable to keep patient here as we are out of ICU beds and  has requested that she be transferred to [facility], patient was graciously accepted by Dr. Bishop, hospitalist.  Patient will be transferred in unstable condition on a Levophed drip with central line and severe sepsis.  Patient staffed in the emergency room with Dr. Gatica.      I have reviewed the nursing notes.    I have reviewed the findings, diagnosis, plan and need for follow up with the patient.    New Prescriptions    No medications on file       Final diagnoses:   Severe sepsis (H)   Acute encephalopathy   Hyponatremia   Hyperkalemia   Acute kidney injury (H)       7/13/2020   [facility] EMERGENCY DEPARTMENT     Mera Kruse, SHEELA CNP  07/13/20 1451

## 2020-07-13 NOTE — ED TRIAGE NOTES
Patient arrived via EMS after her spouse heard her fall in the shower. Police on scene noted her to be in and out of consciousness. Her BP en route was low, slow to respond. Lydia East RN

## 2020-07-13 NOTE — LETTER
July 15, 2020        Sana Ward  [address]   [facility] [address]    This letter provides a written record that you were tested for COVID-19 on 07/13/20.       Your result was negative. This means that we didn t find the virus that causes COVID-19 in your sample. A test may show negative when you do actually have the virus. This can happen when the virus is in the early stages of infection, before you feel illness symptoms.    If you have symptoms   Stay home and away from others (self-isolate) until you meet ALL of the guidelines below:    You ve had no fever--and no medicine that reduces fever--for 3 full days (72 hours). And      Your other symptoms have gotten better. For example, your cough or breathing has improved. And     At least 10 days have passed since your symptoms started.    During this time:    Stay home. Don t go to work, school or anywhere else.     Stay in your own room, including for meals. Use your own bathroom if you can.    Stay away from others in your home. No hugging, kissing or shaking hands. No visitors.    Clean  high touch  surfaces often (doorknobs, counters, handles, etc.). Use a household cleaning spray or wipes. You can find a full list on the EPA website at www.epa.gov/pesticide-registration/list-n-disinfectants-use-against-sars-cov-2.    Cover your mouth and nose with a mask, tissue or washcloth to avoid spreading germs.    Wash your hands and face often with soap and water.    Going back to work  Check with your employer for any guidelines to follow for going back to work.    Employers: This document serves as formal notice that your employee tested negative for COVID-19, as of the testing date shown above.

## 2020-07-13 NOTE — PHARMACY-VANCOMYCIN DOSING SERVICE
July 13, 2020  Patient was started on vancomycin at 2000mg q12h with a goal trough of 15-20 mg/L for Sepsis. Used Method 2 for dosing considerations.  Gregg Lozada RPH

## 2020-07-14 ENCOUNTER — PATIENT OUTREACH (OUTPATIENT)
Dept: CARE COORDINATION | Facility: CLINIC | Age: 55
End: 2020-07-14

## 2020-07-14 DIAGNOSIS — R65.20 SEVERE SEPSIS (H): Primary | ICD-10-CM

## 2020-07-14 DIAGNOSIS — A41.9 SEVERE SEPSIS (H): Primary | ICD-10-CM

## 2020-07-14 NOTE — PROGRESS NOTES
Clinic Care Coordination Contact    Situation: Patient chart reviewed by care coordinator.    Background: Patient was on IP/AT discharge list today    Assessment: Patient was diagnosed with sepsis unfortunately there are no ICU beds available and was transferred to [facility].      Plan/Recommendations: No Reach by care coordination at this time pt is currently inpt.     Jeanette VICENTEN, RN, PHN, CCM  Primary Clinic Care Coordination    [facility]  [email or web address]   Office: [phone]  Employed by [employer]

## 2020-07-14 NOTE — RESULT ENCOUNTER NOTE
Admitted to [facility] ICU on 7/13/20  JOHN Olivera, notified of positive preliminary urine culture.  Will fax final when available  Fax Number:[phone]  Result faxed

## 2020-07-15 LAB
BACTERIA SPEC CULT: ABNORMAL
SPECIMEN SOURCE: ABNORMAL

## 2020-07-16 ENCOUNTER — TRANSFERRED RECORDS (OUTPATIENT)
Dept: HEALTH INFORMATION MANAGEMENT | Facility: CLINIC | Age: 55
End: 2020-07-16

## 2020-07-19 LAB
BACTERIA SPEC CULT: NO GROWTH
BACTERIA SPEC CULT: NO GROWTH
Lab: NORMAL
SPECIMEN SOURCE: NORMAL
SPECIMEN SOURCE: NORMAL

## 2020-07-20 ENCOUNTER — TRANSFERRED RECORDS (OUTPATIENT)
Dept: HEALTH INFORMATION MANAGEMENT | Facility: CLINIC | Age: 55
End: 2020-07-20

## 2020-07-23 ENCOUNTER — MEDICAL CORRESPONDENCE (OUTPATIENT)
Dept: HEALTH INFORMATION MANAGEMENT | Facility: CLINIC | Age: 55
End: 2020-07-23

## 2020-08-26 ENCOUNTER — MEDICAL CORRESPONDENCE (OUTPATIENT)
Dept: HEALTH INFORMATION MANAGEMENT | Facility: CLINIC | Age: 55
End: 2020-08-26

## 2020-08-27 DIAGNOSIS — I10 HYPERTENSION GOAL BP (BLOOD PRESSURE) < 130/80: ICD-10-CM

## 2020-08-27 RX ORDER — LOSARTAN POTASSIUM 25 MG/1
TABLET ORAL
Qty: 90 TABLET | Refills: 0 | Status: SHIPPED | OUTPATIENT
Start: 2020-08-27 | End: 2020-09-30

## 2020-08-27 NOTE — TELEPHONE ENCOUNTER
Routing refill request to provider for review/approval because:  Labs out of range:  Creatinine, Potassium    CINTHIA BeaulieuN, RN  [facility]

## 2020-08-28 ENCOUNTER — MEDICAL CORRESPONDENCE (OUTPATIENT)
Dept: HEALTH INFORMATION MANAGEMENT | Facility: CLINIC | Age: 55
End: 2020-08-28

## 2020-08-28 DIAGNOSIS — Z93.2 ILEOSTOMY STATUS (H): ICD-10-CM

## 2020-08-28 DIAGNOSIS — E78.5 HYPERLIPIDEMIA LDL GOAL <100: ICD-10-CM

## 2020-08-28 DIAGNOSIS — Z93.2 STATUS POST ILEOSTOMY (H): ICD-10-CM

## 2020-08-28 RX ORDER — SIMVASTATIN 20 MG
TABLET ORAL
Qty: 30 TABLET | Refills: 0 | Status: SHIPPED | OUTPATIENT
Start: 2020-08-28 | End: 2020-10-28

## 2020-08-28 NOTE — TELEPHONE ENCOUNTER
"  Requested Prescriptions   Pending Prescriptions Disp Refills     simvastatin (ZOCOR) 20 MG tablet 90 tablet 1     Sig: TAKE 1 TABLET BY MOUTH DAILY AT BEDTIME   Last Written Prescription Date:  2/11/2020  Last Fill Quantity: 90,  # refills: 1   Last office visit: 4/13/2020 with prescribing provider:     Future Office Visit:   Next 5 appointments (look out 90 days)    Sep 09, 2020 11:40 AM CDT  SHORT with Beck Martinez MD  [facility] [address] [place] [id number] [phone]             Statins Protocol Failed - 8/28/2020  8:05 AM        Failed - LDL on file in past 12 months     Recent Labs   Lab Test 06/14/19  1016   LDL 56           Passed - No abnormal creatine kinase in past 12 months     No lab results found.             Passed - Recent (12 mo) or future (30 days) visit within the authorizing provider's specialty     Patient has had an office visit with the authorizing provider or a provider within the authorizing providers department within the previous 12 mos or has a future within next 30 days. See \"Patient Info\" tab in inbasket, or \"Choose Columns\" in Meds & Orders section of the refill encounter.              Passed - Medication is active on med list        Passed - Patient is age 18 or older        Passed - No active pregnancy on record        Passed - No positive pregnancy test in past 12 months         Medication is being filled for 1 time refill only due to:  9/9/2020 office visit for further refills   Dolores Sheriff RN      "

## 2020-09-08 DIAGNOSIS — Z93.2 STATUS POST ILEOSTOMY (H): ICD-10-CM

## 2020-09-08 RX ORDER — COLOSTOMY BAGS 3/4"
EACH MISCELLANEOUS
Qty: 20 EACH | Refills: 11 | Status: SHIPPED | OUTPATIENT
Start: 2020-09-08

## 2020-09-08 NOTE — TELEPHONE ENCOUNTER
"Requested Prescriptions   Pending Prescriptions Disp Refills     Ostomy Supplies (PREMIER DRAINABLE) Pouch MISC [Pharmacy Med Name: PREMIER DRAIN POUCH 1.125\"] 20 each 11     Sig: USE EVERY DAY AS DIRECTED     Last Written Prescription Date:  09/15/2016  Last Fill Quantity: 1,   # refills: 3  Last Office Visit: 04/13/2020  Future Office visit:    Next 5 appointments (look out 90 days)    Sep 30, 2020  1:40 PM CDT  SHORT with Beck Martinez MD  [facility] ([address] [id number] [phone]           Routing refill request to provider for review/approval because:  Drug not on the [facility], [facility] or  Health refill protocol or controlled substance    Darling Clarke MA     "

## 2020-09-17 DIAGNOSIS — F99 MENTAL HEALTH DISORDER: Primary | ICD-10-CM

## 2020-09-17 NOTE — TELEPHONE ENCOUNTER
Vitamin D 50,000 units   This medication is NOT active on pt chart. Therefore will forward to PCP, Dr. Martinez to deny or approve.................................JOHN Mike

## 2020-09-18 RX ORDER — ERGOCALCIFEROL 1.25 MG/1
CAPSULE, LIQUID FILLED ORAL
Qty: 12 CAPSULE | Refills: 3 | Status: SHIPPED | OUTPATIENT
Start: 2020-09-18

## 2020-09-21 ENCOUNTER — TRANSFERRED RECORDS (OUTPATIENT)
Dept: HEALTH INFORMATION MANAGEMENT | Facility: CLINIC | Age: 55
End: 2020-09-21

## 2020-09-24 DIAGNOSIS — K29.50 CHRONIC GASTRITIS, PRESENCE OF BLEEDING UNSPECIFIED, UNSPECIFIED GASTRITIS TYPE: Primary | ICD-10-CM

## 2020-09-25 RX ORDER — PANTOPRAZOLE SODIUM 20 MG/1
20 TABLET, DELAYED RELEASE ORAL 2 TIMES DAILY
Qty: 60 TABLET | Refills: 0 | Status: SHIPPED | OUTPATIENT
Start: 2020-09-25 | End: 2020-10-22

## 2020-09-30 ENCOUNTER — OFFICE VISIT (OUTPATIENT)
Dept: FAMILY MEDICINE | Facility: CLINIC | Age: 55
End: 2020-09-30
Payer: MEDICARE

## 2020-09-30 VITALS
SYSTOLIC BLOOD PRESSURE: 104 MMHG | HEART RATE: 82 BPM | BODY MASS INDEX: 38.51 KG/M2 | RESPIRATION RATE: 14 BRPM | OXYGEN SATURATION: 98 % | DIASTOLIC BLOOD PRESSURE: 70 MMHG | WEIGHT: 217.38 LBS | TEMPERATURE: 98.1 F

## 2020-09-30 DIAGNOSIS — E78.5 HYPERLIPIDEMIA LDL GOAL <100: ICD-10-CM

## 2020-09-30 DIAGNOSIS — E03.4 HYPOTHYROIDISM DUE TO ACQUIRED ATROPHY OF THYROID: ICD-10-CM

## 2020-09-30 DIAGNOSIS — E11.9 TYPE 2 DIABETES MELLITUS WITHOUT COMPLICATION, WITHOUT LONG-TERM CURRENT USE OF INSULIN (H): Primary | ICD-10-CM

## 2020-09-30 DIAGNOSIS — Z93.2 STATUS POST ILEOSTOMY (H): ICD-10-CM

## 2020-09-30 DIAGNOSIS — I10 HYPERTENSION GOAL BP (BLOOD PRESSURE) < 130/80: ICD-10-CM

## 2020-09-30 LAB
CHOLEST SERPL-MCNC: 137 MG/DL
HBA1C MFR BLD: 5.8 % (ref 0–5.6)
HDLC SERPL-MCNC: 37 MG/DL
LDLC SERPL CALC-MCNC: 76 MG/DL
NONHDLC SERPL-MCNC: 100 MG/DL
TRIGL SERPL-MCNC: 118 MG/DL

## 2020-09-30 PROCEDURE — 80061 LIPID PANEL: CPT | Performed by: FAMILY MEDICINE

## 2020-09-30 PROCEDURE — 99214 OFFICE O/P EST MOD 30 MIN: CPT | Performed by: FAMILY MEDICINE

## 2020-09-30 PROCEDURE — 83036 HEMOGLOBIN GLYCOSYLATED A1C: CPT | Performed by: FAMILY MEDICINE

## 2020-09-30 PROCEDURE — 36415 COLL VENOUS BLD VENIPUNCTURE: CPT | Performed by: FAMILY MEDICINE

## 2020-09-30 RX ORDER — LEVOTHYROXINE SODIUM 75 UG/1
75 TABLET ORAL DAILY
Qty: 90 TABLET | Refills: 1 | Status: SHIPPED | OUTPATIENT
Start: 2020-09-30

## 2020-09-30 RX ORDER — LANOLIN ALCOHOL/MO/W.PET/CERES
3 CREAM (GRAM) TOPICAL
COMMUNITY
Start: 2020-07-23

## 2020-09-30 RX ORDER — NYSTATIN 100000 [USP'U]/G
100000 POWDER TOPICAL
COMMUNITY
Start: 2020-07-23

## 2020-09-30 RX ORDER — SUCRALFATE ORAL 1 G/10ML
SUSPENSION ORAL
Qty: 100 ML | Refills: 4 | Status: SHIPPED | OUTPATIENT
Start: 2020-09-30

## 2020-09-30 RX ORDER — LOSARTAN POTASSIUM 25 MG/1
25 TABLET ORAL DAILY
Qty: 90 TABLET | Refills: 1 | Status: SHIPPED | OUTPATIENT
Start: 2020-09-30 | End: 2021-02-10

## 2020-09-30 ASSESSMENT — PAIN SCALES - GENERAL: PAINLEVEL: NO PAIN (0)

## 2020-09-30 NOTE — LETTER
October 1, 2020      Sana Ward  [address]   [facility] [address]        Dear ,    We are writing to inform you of your test results.    Your test results fall within the expected range(s) or remain unchanged from previous results.  Please continue with your current treatment plan.   .     Resulted Orders   Hemoglobin A1c   Result Value Ref Range    Hemoglobin A1C 5.8 (H) 0 - 5.6 %      Comment:      Normal <5.7% Prediabetes 5.7-6.4%  Diabetes 6.5% or higher - adopted from ADA   consensus guidelines.     Lipid panel reflex to direct LDL Fasting   Result Value Ref Range    Cholesterol 137 <200 mg/dL    Triglycerides 118 <150 mg/dL      Comment:      Fasting specimen    HDL Cholesterol 37 (L) >49 mg/dL    LDL Cholesterol Calculated 76 <100 mg/dL      Comment:      Desirable:       <100 mg/dl    Non HDL Cholesterol 100 <130 mg/dL       If you have any questions or concerns, please call the clinic at the number listed above.       Sincerely,        Beck Martinez MD

## 2020-09-30 NOTE — PROGRESS NOTES
Subjective     Sana Ward is a 55 year old female who presents to clinic today for the following health issues:    HPI         Hospital Follow-up Visit:    Hospital/Nursing Home/IP Rehab Facility: [facility]   Date of Admission: 07/16/2020  Date of Discharge: 08/27/2020  Reason(s) for Admission: Sepsis       Was your hospitalization related to COVID-19? No   Problems taking medications regularly:  None  Medication changes since discharge: None  Problems adhering to non-medication therapy:  None    Need code 8613 for ostomy bag     Summary of hospitalization:  [facility]  Diagnostic Tests/Treatments reviewed.  Follow up needed: none  Other Healthcare Providers Involved in Patient s Care:         None  Update since discharge: improved.       Post Discharge Medication Reconciliation: discharge medications reconciled, continue medications without change.  Plan of care communicated with patient and family              Diabetes Follow-up    How often are you checking your blood sugar? One time daily  What time of day are you checking your blood sugars (select all that apply)?  Before meals  Have you had any blood sugars above 200?  No  Have you had any blood sugars below 70?  No    What symptoms do you notice when your blood sugar is low?  Confusion    What concerns do you have today about your diabetes? None     Do you have any of these symptoms? (Select all that apply)  No numbness or tingling in feet.  No redness, sores or blisters on feet.  No complaints of excessive thirst.  No reports of blurry vision.  No significant changes to weight.    Have you had a diabetic eye exam in the last 12 months?         BP Readings from Last 2 Encounters:   09/30/20 104/70   07/13/20 95/59     Hemoglobin A1C (%)   Date Value   12/13/2019 5.9 (H)   06/14/2019 5.9 (H)     LDL Cholesterol Calculated (mg/dL)   Date Value   06/14/2019 56   07/12/2018 65                   Review of  Systems   CONSTITUTIONAL: NEGATIVE for fever, chills, change in weight  ENT/MOUTH: NEGATIVE for ear, mouth and throat problems  RESP: NEGATIVE for significant cough or SOB  CV: NEGATIVE for chest pain, palpitations or peripheral edema  GI: NEGATIVE for nausea, abdominal pain, heartburn, or change in bowel habits and POSITIVE for Hx gastritis esophageal which is resolved and small hiatal hernia on EGD  MUSCULOSKELETAL: NEGATIVE for significant arthralgias or myalgia and POSITIVE  for weakness improving with home PT.  ROS otherwise negative      Objective    /70 (BP Location: Right arm, Patient Position: Chair, Cuff Size: Adult Regular)   Pulse 82   Temp 98.1  F (36.7  C) (Temporal)   Resp 14   Wt 98.6 kg (217 lb 6 oz)   SpO2 98%   BMI 38.51 kg/m    Body mass index is 38.51 kg/m .  Physical Exam   GENERAL: healthy, alert, no distress, obese and develop delayed.  HENT: ear canals and TM's normal, nose and mouth without ulcers or lesions  NECK: no adenopathy, no asymmetry, masses, or scars and thyroid normal to palpation  RESP: lungs clear to auscultation - no rales, rhonchi or wheezes  CV: regular rate and rhythm, normal S1 S2, no S3 or S4, no murmur, click or rub, no peripheral edema and peripheral pulses strong  ABDOMEN: soft, nontender, no hepatosplenomegaly, no masses and bowel sounds normal  MS: no gross musculoskeletal defects noted, no edema    Admission on 07/13/2020, Discharged on 07/13/2020   Component Date Value Ref Range Status     WBC 07/13/2020 19.1* 4.0 - 11.0 10e9/L Final     RBC Count 07/13/2020 3.03* 3.8 - 5.2 10e12/L Final     Hemoglobin 07/13/2020 8.7* 11.7 - 15.7 g/dL Final     Hematocrit 07/13/2020 24.4* 35.0 - 47.0 % Final     MCV 07/13/2020 81  78 - 100 fl Final     MCH 07/13/2020 28.7  26.5 - 33.0 pg Final     MCHC 07/13/2020 35.7  31.5 - 36.5 g/dL Final     RDW 07/13/2020 14.6  10.0 - 15.0 % Final     Platelet Count 07/13/2020 323  150 - 450 10e9/L Final     Diff Method 07/13/2020  Automated Method   Final     % Neutrophils 07/13/2020 82.7  % Final     % Lymphocytes 07/13/2020 4.7  % Final     % Monocytes 07/13/2020 8.9  % Final     % Eosinophils 07/13/2020 0.1  % Final     % Basophils 07/13/2020 0.1  % Final     % Immature Granulocytes 07/13/2020 3.5  % Final     Nucleated RBCs 07/13/2020 0  0 /100 Final     Absolute Neutrophil 07/13/2020 15.8* 1.6 - 8.3 10e9/L Final     Absolute Lymphocytes 07/13/2020 0.9  0.8 - 5.3 10e9/L Final     Absolute Monocytes 07/13/2020 1.7* 0.0 - 1.3 10e9/L Final     Absolute Basophils 07/13/2020 0.0  0.0 - 0.2 10e9/L Final     Abs Immature Granulocytes 07/13/2020 0.7* 0 - 0.4 10e9/L Final     Absolute Nucleated RBC 07/13/2020 0.0   Final     Sodium 07/13/2020 115* 133 - 144 mmol/L Final    Comment: Critical Value called to and read back by  KARAN RAPP IN ED AT 1406 ON 7.13.2020 CK       Potassium 07/13/2020 5.8* 3.4 - 5.3 mmol/L Final     Chloride 07/13/2020 90* 94 - 109 mmol/L Final     Carbon Dioxide 07/13/2020 16* 20 - 32 mmol/L Final     Anion Gap 07/13/2020 9  3 - 14 mmol/L Final     Glucose 07/13/2020 198* 70 - 99 mg/dL Final     Urea Nitrogen 07/13/2020 58* 7 - 30 mg/dL Final     Creatinine 07/13/2020 1.55* 0.52 - 1.04 mg/dL Final     GFR Estimate 07/13/2020 37* >60 mL/min/[1.73_m2] Final    Comment: Non  GFR Calc  Starting 12/18/2018, serum creatinine based estimated GFR (eGFR) will be   calculated using the Chronic Kidney Disease Epidemiology Collaboration   (CKD-EPI) equation.       GFR Estimate If Black 07/13/2020 43* >60 mL/min/[1.73_m2] Final    Comment:  GFR Calc  Starting 12/18/2018, serum creatinine based estimated GFR (eGFR) will be   calculated using the Chronic Kidney Disease Epidemiology Collaboration   (CKD-EPI) equation.       Calcium 07/13/2020 7.1* 8.5 - 10.1 mg/dL Final     Bilirubin Total 07/13/2020 0.3  0.2 - 1.3 mg/dL Final     Albumin 07/13/2020 2.4* 3.4 - 5.0 g/dL Final     Protein Total 07/13/2020 5.4*  6.8 - 8.8 g/dL Final     Alkaline Phosphatase 07/13/2020 69  40 - 150 U/L Final     ALT 07/13/2020 16  0 - 50 U/L Final     AST 07/13/2020 19  0 - 45 U/L Final     Lactic Acid 07/13/2020 3.9* 0.7 - 2.0 mmol/L Corrected    Comment: Significant value called to and read back by  JOSÉ MIGUEL CULLEN IN ED AT 1345 ON 7.13.2020 CK  CORRECTED ON 07/13 AT 1345: PREVIOUSLY REPORTED AS 3.9       Troponin I ES 07/13/2020 <0.015  0.000 - 0.045 ug/L Final    Comment: The 99th percentile for upper reference range is 0.045 ug/L.  Troponin values   in the range of 0.045 - 0.120 ug/L may be associated with risks of adverse   clinical events.       Ph Venous 07/13/2020 7.26* 7.32 - 7.43 pH Final     PCO2 Venous 07/13/2020 31* 40 - 50 mm Hg Final     PO2 Venous 07/13/2020 39  25 - 47 mm Hg Final     Bicarbonate Venous 07/13/2020 14* 21 - 28 mmol/L Final     Base Deficit Venous 07/13/2020 12.2  mmol/L Final    Abnormal Result, Ref range: -7.7 to 1.9     FIO2 07/13/2020 21%   Final     Color Urine 07/13/2020 Yellow   Final     Appearance Urine 07/13/2020 Cloudy   Final     Glucose Urine 07/13/2020 Negative  NEG^Negative mg/dL Final     Bilirubin Urine 07/13/2020 Negative  NEG^Negative Final     Ketones Urine 07/13/2020 Negative  NEG^Negative mg/dL Final     Specific Gravity Urine 07/13/2020 1.011  1.003 - 1.035 Final     Blood Urine 07/13/2020 Small* NEG^Negative Final     pH Urine 07/13/2020 5.0  5.0 - 7.0 pH Final     Protein Albumin Urine 07/13/2020 Negative  NEG^Negative mg/dL Final     Urobilinogen mg/dL 07/13/2020 0.0  0.0 - 2.0 mg/dL Final     Nitrite Urine 07/13/2020 Negative  NEG^Negative Final     Leukocyte Esterase Urine 07/13/2020 Trace* NEG^Negative Final     Source 07/13/2020 Catheterized Urine   Final     WBC Urine 07/13/2020 12* 0 - 5 /HPF Final     RBC Urine 07/13/2020 <1  0 - 2 /HPF Final     Bacteria Urine 07/13/2020 Few* NEG^Negative /HPF Final     Squamous Epithelial /HPF Urine 07/13/2020 <1  0 - 1 /HPF Final     Mucous  Urine 07/13/2020 Present* NEG^Negative /LPF Final     Hyaline Casts 07/13/2020 27* 0 - 2 /LPF Final     Amorphous Crystals 07/13/2020 Few* NEG^Negative /HPF Final     Magnesium 07/13/2020 1.9  1.6 - 2.3 mg/dL Final     Specimen Description 07/13/2020 Blood Unspecified Site   Final     Culture Micro 07/13/2020 No growth   Final     Specimen Description 07/13/2020 Blood Right Hand   Final     Special Requests 07/13/2020 Received in aerobic bottle only   Final     Culture Micro 07/13/2020 No growth   Final     COVID-19 Virus PCR to [facility]* 07/13/2020 Nasopharyngeal   Final     COVID-19 Virus PCR to [facility] - Re* 07/13/2020 Test received-See reflex to IDDL test SARS CoV2 (COVID-19) Virus RT-PCR   Final     TSH 07/13/2020 3.22  0.40 - 4.00 mU/L Final     Specimen Description 07/13/2020 Catheterized Urine   Final     Culture Micro 07/13/2020 *  Final                    Value:50,000 to 100,000 colonies/mL  Proteus mirabilis       Cannabinoids (55-fsp-0-carboxy-9-T* 07/13/2020 Not Detected  NDET^Not Detected ng/mL Final    Cutoff for a negative cannabinoid is 50 ng/mL or less.     Phencyclidine (Phencyclidine) 07/13/2020 Not Detected  NDET^Not Detected ng/mL Final    Cutoff for a negative PCP is 25 ng/mL or less.     Cocaine (Benzoylecgonine) 07/13/2020 Not Detected  NDET^Not Detected ng/mL Final    Cutoff for a negative cocaine is 150 ng/ml or less.     Methamphetamine (d-Methamphetamine) 07/13/2020 Not Detected  NDET^Not Detected ng/mL Final    Cutoff for a negative methamphetamine is 500 ng/ml or less.     Opiates (Morphine) 07/13/2020 Not Detected  NDET^Not Detected ng/mL Final    Cutoff for a negative opiate is 100 ng/ml or less.     Amphetamine (d-Amphetamine) 07/13/2020 Not Detected  NDET^Not Detected ng/mL Final    Cutoff for a negative amphetamine is 500 ng/mL or less.     Benzodiazepines (Nordiazepam) 07/13/2020 Not Detected  NDET^Not Detected ng/mL Final    Cutoff for a negative benzodiazepine is 150 ng/ml  or less.     Tricyclic Antidepressants (Desipra* 07/13/2020 Not Detected  NDET^Not Detected ng/mL Final    Cutoff for a negative tricyclic antidepressant is 300 ng/ml or less.     Methadone (Methadone) 07/13/2020 Not Detected  NDET^Not Detected ng/mL Final    Cutoff for a negative methadone is 200 ng/ml or less.     Barbiturates (Butalbital) 07/13/2020 Not Detected  NDET^Not Detected ng/mL Final    Cutoff for a negative barbituate is 200 ng/ml or less.     Oxycodone (Oxycodone) 07/13/2020 Not Detected  NDET^Not Detected ng/mL Final    Cutoff for a negative Oxycodone is 100 ng/mL or less.     Propoxyphene (Norpropoxyphene) 07/13/2020 Not Detected  NDET^Not Detected ng/mL Final    Cutoff for a negative propoxyphene is 300 ng/ml or less     Buprenorphine (Buprenorphine) 07/13/2020 Not Detected  NDET^Not Detected ng/mL Final    Cutoff for a negative buprenorphine is 10 ng/ml or less     SARS-CoV-2 Virus Specimen Source 07/13/2020 Nasopharyngeal   Final     SARS-CoV-2 PCR Result 07/13/2020 NEGATIVE   Final    SARS-CoV2 (COVID-19) RNA not detected, presumed negative.     SARS-CoV-2 PCR Comment 07/13/2020    Final                    Value:Testing was performed using the Xpert Xpress SARS-CoV-2 Assay on the Cepheid Gene-Xpert   Instrument Systems. Additional information about this Emergency Use Authorization (EUA)   assay can be found via the Lab Guide.      Comment: This test should be ordered for the detection of SARS-CoV-2 in individuals who   meet SARS-CoV-2 clinical and/or epidemiological criteria. Test performance is   unknown in asymptomatic patients.  This test is for in vitro diagnostic use under the FDA EUA for laboratories   certified under CLIA to perform high complexity testing. This test has not   been FDA cleared or approved.  A negative result does not rule out the presence of PCR inhibitors in the   specimen or target RNA in concentration below the limit of detection for the   assay. The possibility of a  "false negative should be considered if the   patient's recent exposure or clinical presentation suggests COVID-19.  This test was validated by the [facility]   [facility]. This laboratory is certified under the Clinical   Laboratory Improvement Amendments of 1988 (CLIA-88) as qualified to perform   high complexity laboratory testing.               Assessment & Plan     Status post ileostomy  Chronic stable. The current medical regimen is effective;  continue present plan and medications.   - sucralfate (CARAFATE) 1 GM/10ML suspension; APPLY A SMALL AMOUNT TO PARA STOMA 1-2 TIMES DAILY    Type 2 diabetes mellitus without complication, without long-term current use of insulin (H)  Chronic, controlled. Recheck A1C and The current medical regimen is effective;  continue present plan and medications.   - Hemoglobin A1c    Hyperlipidemia LDL goal <100  Chronic stable. The current medical regimen is effective;  continue present plan and medications.   - Lipid panel reflex to direct LDL Fasting    Hypothyroidism due to acquired atrophy of thyroid  Chronic, TSH stable. The current medical regimen is effective;  continue present plan and medications.   - levothyroxine (SYNTHROID/LEVOTHROID) 75 MCG tablet; Take 1 tablet (75 mcg) by mouth daily    Hypertension goal BP (blood pressure) < 130/80  Chronic controlled. Continue current cares.   - losartan (COZAAR) 25 MG tablet; Take 1 tablet (25 mg) by mouth daily     BMI:   Estimated body mass index is 38.51 kg/m  as calculated from the following:    Height as of 2/17/20: 1.6 m (5' 3\").    Weight as of this encounter: 98.6 kg (217 lb 6 oz).   Weight management plan: Discussed healthy diet and exercise guidelines        SELF MONITORING:       - Please check blood glucose readings daily       - Please check blood pressure readings daily  Work on weight loss  Regular exercise    Return in about 6 months (around 3/30/2021) for Lab Work, Routine " Visit.    Beck Martinez MD  [facility]

## 2020-10-22 DIAGNOSIS — K29.50 CHRONIC GASTRITIS, PRESENCE OF BLEEDING UNSPECIFIED, UNSPECIFIED GASTRITIS TYPE: ICD-10-CM

## 2020-10-22 RX ORDER — PANTOPRAZOLE SODIUM 20 MG/1
20 TABLET, DELAYED RELEASE ORAL 2 TIMES DAILY
Qty: 60 TABLET | Refills: 1 | Status: SHIPPED | OUTPATIENT
Start: 2020-10-22 | End: 2020-12-23

## 2020-10-28 DIAGNOSIS — Z93.2 ILEOSTOMY STATUS (H): ICD-10-CM

## 2020-10-28 DIAGNOSIS — E78.5 HYPERLIPIDEMIA LDL GOAL <100: ICD-10-CM

## 2020-10-28 DIAGNOSIS — Z93.2 STATUS POST ILEOSTOMY (H): ICD-10-CM

## 2020-10-28 RX ORDER — SIMVASTATIN 20 MG
TABLET ORAL
Qty: 90 TABLET | Refills: 3 | Status: SHIPPED | OUTPATIENT
Start: 2020-10-28

## 2020-10-28 NOTE — TELEPHONE ENCOUNTER
Prescription approved per [facility] [place].    CINTHIA BeaulieuN, RN  [facility]

## 2020-12-16 DIAGNOSIS — Z93.2 STATUS POST ILEOSTOMY (H): ICD-10-CM

## 2020-12-16 NOTE — TELEPHONE ENCOUNTER
Routing refill request to provider for review/approval because:  Drug not on the [facility] refill protocol     Requested Prescriptions   Pending Prescriptions Disp Refills     Ostomy Supplies (SKIN GEL PROTECT DRESSING WIPE) PADS 50 each 3     Sig: USE EVERY DAY AS DIRECTED       There is no refill protocol information for this order      Last Written Prescription Date:  10/30/2019  Last Fill Quantity: 50,  # refills: 3   Last office visit 9/30/2020    Dolores Sheriff RN

## 2020-12-17 RX ORDER — KARAYA GUM
POWDER (GRAM) TOPICAL
Qty: 50 EACH | Refills: 3 | Status: SHIPPED | OUTPATIENT
Start: 2020-12-17

## 2020-12-23 DIAGNOSIS — K29.50 CHRONIC GASTRITIS, PRESENCE OF BLEEDING UNSPECIFIED, UNSPECIFIED GASTRITIS TYPE: ICD-10-CM

## 2020-12-23 RX ORDER — PANTOPRAZOLE SODIUM 20 MG/1
20 TABLET, DELAYED RELEASE ORAL 2 TIMES DAILY
Qty: 60 TABLET | Refills: 3 | Status: SHIPPED | OUTPATIENT
Start: 2020-12-23 | End: 2021-02-05

## 2020-12-23 NOTE — TELEPHONE ENCOUNTER
Prescription approved per [facility] [place].    Mraicel Elias RN

## 2021-01-12 ENCOUNTER — HOSPITAL ENCOUNTER (OUTPATIENT)
Dept: MAMMOGRAPHY | Facility: CLINIC | Age: 56
Discharge: HOME | End: 2021-01-12
Attending: FAMILY MEDICINE | Admitting: FAMILY MEDICINE
Payer: MEDICARE

## 2021-01-12 DIAGNOSIS — Z12.31 VISIT FOR SCREENING MAMMOGRAM: ICD-10-CM

## 2021-01-12 PROCEDURE — 77067 SCR MAMMO BI INCL CAD: CPT

## 2021-02-04 DIAGNOSIS — Z93.2 STATUS POST ILEOSTOMY (H): ICD-10-CM

## 2021-02-05 ENCOUNTER — TELEPHONE (OUTPATIENT)
Dept: FAMILY MEDICINE | Facility: CLINIC | Age: 56
End: 2021-02-05

## 2021-02-05 DIAGNOSIS — K29.50 CHRONIC GASTRITIS, PRESENCE OF BLEEDING UNSPECIFIED, UNSPECIFIED GASTRITIS TYPE: ICD-10-CM

## 2021-02-05 RX ORDER — PANTOPRAZOLE SODIUM 40 MG/1
40 TABLET, DELAYED RELEASE ORAL 2 TIMES DAILY
Qty: 90 TABLET | Refills: 1 | Status: SHIPPED | OUTPATIENT
Start: 2021-02-05 | End: 2021-02-05

## 2021-02-05 RX ORDER — PANTOPRAZOLE SODIUM 40 MG/1
40 TABLET, DELAYED RELEASE ORAL DAILY
Qty: 90 TABLET | Refills: 1 | Status: SHIPPED | OUTPATIENT
Start: 2021-02-05

## 2021-02-05 NOTE — TELEPHONE ENCOUNTER
Note from [facility]: Insurance will only cover 1 tablet per day of the Protonix (RX currently written for 20 mg BID). Can we either switch to 40 mg every day or 20 mg every day?    Will route to PCP.    CAMELIA Beaulieu, RN  [facility]

## 2021-02-05 NOTE — TELEPHONE ENCOUNTER
Routing refill request to provider for review/approval because:  Labs out of range:  Creatinine    Maricel Elias Rn

## 2021-02-08 DIAGNOSIS — I10 HYPERTENSION GOAL BP (BLOOD PRESSURE) < 130/80: ICD-10-CM

## 2021-02-09 DIAGNOSIS — K29.50 CHRONIC GASTRITIS, PRESENCE OF BLEEDING UNSPECIFIED, UNSPECIFIED GASTRITIS TYPE: ICD-10-CM

## 2021-02-10 RX ORDER — PANTOPRAZOLE SODIUM 20 MG/1
20 TABLET, DELAYED RELEASE ORAL 2 TIMES DAILY
Qty: 60 TABLET | Refills: 3 | OUTPATIENT
Start: 2021-02-10

## 2021-02-10 RX ORDER — LOSARTAN POTASSIUM 25 MG/1
25 TABLET ORAL DAILY
Qty: 90 TABLET | Refills: 1 | Status: SHIPPED | OUTPATIENT
Start: 2021-02-10

## 2021-02-10 NOTE — TELEPHONE ENCOUNTER
Routing refill request to provider for review/approval because:  Labs out of range:  Creatinine, Potassium    CAMELIA Beaulieu, RN  [facility]

## 2021-03-04 DIAGNOSIS — I10 HYPERTENSION GOAL BP (BLOOD PRESSURE) < 130/80: ICD-10-CM

## 2021-03-04 RX ORDER — METOPROLOL TARTRATE 25 MG/1
TABLET, FILM COATED ORAL
Qty: 90 TABLET | Refills: 1 | Status: SHIPPED | OUTPATIENT
Start: 2021-03-04

## 2021-03-10 ENCOUNTER — TRANSFERRED RECORDS (OUTPATIENT)
Dept: HEALTH INFORMATION MANAGEMENT | Facility: CLINIC | Age: 56
End: 2021-03-10

## 2021-03-16 ENCOUNTER — TRANSFERRED RECORDS (OUTPATIENT)
Dept: HEALTH INFORMATION MANAGEMENT | Facility: CLINIC | Age: 56
End: 2021-03-16

## 2021-03-17 ENCOUNTER — TRANSFERRED RECORDS (OUTPATIENT)
Dept: HEALTH INFORMATION MANAGEMENT | Facility: CLINIC | Age: 56
End: 2021-03-17

## 2021-04-02 ENCOUNTER — TELEPHONE (OUTPATIENT)
Dept: FAMILY MEDICINE | Facility: CLINIC | Age: 56
End: 2021-04-02

## 2021-04-02 NOTE — TELEPHONE ENCOUNTER
Called [facility] [phone] due to fax stating patient needs to have Hospital Follow up. Please assist with scheduling with any provider for 40 min due to Dr. Martinez no openings till May. Please tell [facility] that they need to call to schedule and not to have a fax stating for us to call.     Melanie Shook MA 4/2/2021  3:42 PM

## 2021-04-19 ENCOUNTER — TRANSFERRED RECORDS (OUTPATIENT)
Dept: HEALTH INFORMATION MANAGEMENT | Facility: CLINIC | Age: 56
End: 2021-04-19

## 2021-06-02 ENCOUNTER — TRANSFERRED RECORDS (OUTPATIENT)
Dept: HEALTH INFORMATION MANAGEMENT | Facility: CLINIC | Age: 56
End: 2021-06-02

## 2021-06-08 ENCOUNTER — TRANSFERRED RECORDS (OUTPATIENT)
Dept: HEALTH INFORMATION MANAGEMENT | Facility: CLINIC | Age: 56
End: 2021-06-08

## 2021-06-28 ENCOUNTER — TRANSFERRED RECORDS (OUTPATIENT)
Dept: HEALTH INFORMATION MANAGEMENT | Facility: CLINIC | Age: 56
End: 2021-06-28

## 2021-07-12 ENCOUNTER — TRANSFERRED RECORDS (OUTPATIENT)
Dept: HEALTH INFORMATION MANAGEMENT | Facility: CLINIC | Age: 56
End: 2021-07-12

## 2021-07-20 ENCOUNTER — LAB REQUISITION (OUTPATIENT)
Dept: LAB | Facility: CLINIC | Age: 56
End: 2021-07-20
Payer: MEDICARE

## 2021-07-20 DIAGNOSIS — D64.9 ANEMIA, UNSPECIFIED: ICD-10-CM

## 2021-07-20 DIAGNOSIS — E55.9 VITAMIN D DEFICIENCY, UNSPECIFIED: ICD-10-CM

## 2021-07-20 DIAGNOSIS — E83.42 HYPOMAGNESEMIA: ICD-10-CM

## 2021-07-20 DIAGNOSIS — E03.9 HYPOTHYROIDISM, UNSPECIFIED: ICD-10-CM

## 2021-07-20 LAB
HGB BLD-MCNC: 10.3 G/DL (ref 11.7–15.7)
MAGNESIUM SERPL-MCNC: 1.6 MG/DL (ref 1.6–2.3)
TSH SERPL DL<=0.005 MIU/L-ACNC: 2.16 MU/L (ref 0.4–4)

## 2021-07-20 PROCEDURE — 85018 HEMOGLOBIN: CPT | Mod: ORL | Performed by: FAMILY MEDICINE

## 2021-07-20 PROCEDURE — 82306 VITAMIN D 25 HYDROXY: CPT | Mod: ORL | Performed by: FAMILY MEDICINE

## 2021-07-20 PROCEDURE — 83735 ASSAY OF MAGNESIUM: CPT | Mod: ORL | Performed by: FAMILY MEDICINE

## 2021-07-20 PROCEDURE — 84443 ASSAY THYROID STIM HORMONE: CPT | Mod: ORL | Performed by: FAMILY MEDICINE

## 2021-07-20 PROCEDURE — 36415 COLL VENOUS BLD VENIPUNCTURE: CPT | Mod: ORL | Performed by: FAMILY MEDICINE

## 2021-07-20 PROCEDURE — P9603 ONE-WAY ALLOW PRORATED MILES: HCPCS | Mod: ORL | Performed by: FAMILY MEDICINE

## 2021-07-21 LAB — DEPRECATED CALCIDIOL+CALCIFEROL SERPL-MC: 43 UG/L (ref 20–75)

## 2021-07-26 ENCOUNTER — TRANSFERRED RECORDS (OUTPATIENT)
Dept: HEALTH INFORMATION MANAGEMENT | Facility: CLINIC | Age: 56
End: 2021-07-26

## 2021-07-28 ENCOUNTER — MEDICAL CORRESPONDENCE (OUTPATIENT)
Dept: HEALTH INFORMATION MANAGEMENT | Facility: CLINIC | Age: 56
End: 2021-07-28

## 2021-08-09 ENCOUNTER — TRANSFERRED RECORDS (OUTPATIENT)
Dept: HEALTH INFORMATION MANAGEMENT | Facility: CLINIC | Age: 56
End: 2021-08-09

## 2021-08-24 ENCOUNTER — TRANSFERRED RECORDS (OUTPATIENT)
Dept: HEALTH INFORMATION MANAGEMENT | Facility: CLINIC | Age: 56
End: 2021-08-24

## 2021-09-01 ENCOUNTER — TRANSFERRED RECORDS (OUTPATIENT)
Dept: HEALTH INFORMATION MANAGEMENT | Facility: CLINIC | Age: 56
End: 2021-09-01

## 2021-09-09 ENCOUNTER — TRANSFERRED RECORDS (OUTPATIENT)
Dept: HEALTH INFORMATION MANAGEMENT | Facility: CLINIC | Age: 56
End: 2021-09-09

## 2021-12-30 ENCOUNTER — LAB REQUISITION (OUTPATIENT)
Dept: LAB | Facility: CLINIC | Age: 56
End: 2021-12-30
Payer: MEDICARE

## 2021-12-30 DIAGNOSIS — Z79.899 OTHER LONG TERM (CURRENT) DRUG THERAPY: ICD-10-CM

## 2022-01-04 ENCOUNTER — LAB REQUISITION (OUTPATIENT)
Dept: LAB | Facility: CLINIC | Age: 57
End: 2022-01-04
Payer: MEDICARE

## 2022-01-04 DIAGNOSIS — Z79.899 OTHER LONG TERM (CURRENT) DRUG THERAPY: ICD-10-CM

## 2022-01-04 LAB
ANION GAP SERPL CALCULATED.3IONS-SCNC: 6 MMOL/L (ref 3–14)
BUN SERPL-MCNC: 17 MG/DL (ref 7–30)
CALCIUM SERPL-MCNC: 9.4 MG/DL (ref 8.5–10.1)
CHLORIDE BLD-SCNC: 107 MMOL/L (ref 94–109)
CO2 SERPL-SCNC: 26 MMOL/L (ref 20–32)
CREAT SERPL-MCNC: 0.69 MG/DL (ref 0.52–1.04)
ERYTHROCYTE [DISTWIDTH] IN BLOOD BY AUTOMATED COUNT: 15.5 % (ref 10–15)
GFR SERPL CREATININE-BSD FRML MDRD: >90 ML/MIN/1.73M2
GLUCOSE BLD-MCNC: 152 MG/DL (ref 70–99)
HCT VFR BLD AUTO: 36.2 % (ref 35–47)
HGB BLD-MCNC: 11.5 G/DL (ref 11.7–15.7)
MCH RBC QN AUTO: 28.3 PG (ref 26.5–33)
MCHC RBC AUTO-ENTMCNC: 31.8 G/DL (ref 31.5–36.5)
MCV RBC AUTO: 89 FL (ref 78–100)
PLATELET # BLD AUTO: 293 10E3/UL (ref 150–450)
POTASSIUM BLD-SCNC: 3.9 MMOL/L (ref 3.4–5.3)
RBC # BLD AUTO: 4.07 10E6/UL (ref 3.8–5.2)
SODIUM SERPL-SCNC: 139 MMOL/L (ref 133–144)
WBC # BLD AUTO: 5.7 10E3/UL (ref 4–11)

## 2022-01-04 PROCEDURE — P9603 ONE-WAY ALLOW PRORATED MILES: HCPCS | Mod: ORL | Performed by: FAMILY MEDICINE

## 2022-01-04 PROCEDURE — 85027 COMPLETE CBC AUTOMATED: CPT | Mod: ORL | Performed by: FAMILY MEDICINE

## 2022-01-04 PROCEDURE — 36415 COLL VENOUS BLD VENIPUNCTURE: CPT | Mod: ORL | Performed by: FAMILY MEDICINE

## 2022-01-04 PROCEDURE — 80048 BASIC METABOLIC PNL TOTAL CA: CPT | Mod: ORL | Performed by: FAMILY MEDICINE

## 2022-06-28 ENCOUNTER — LAB REQUISITION (OUTPATIENT)
Dept: LAB | Facility: CLINIC | Age: 57
End: 2022-06-28
Payer: MEDICARE

## 2022-06-28 DIAGNOSIS — Z79.899 OTHER LONG TERM (CURRENT) DRUG THERAPY: ICD-10-CM

## 2022-06-28 LAB
CHOLEST SERPL-MCNC: 89 MG/DL
FASTING STATUS PATIENT QL REPORTED: ABNORMAL
HBA1C MFR BLD: 6.1 % (ref 0–5.6)
HDLC SERPL-MCNC: 29 MG/DL
LDLC SERPL CALC-MCNC: 20 MG/DL
NONHDLC SERPL-MCNC: 60 MG/DL
TRIGL SERPL-MCNC: 200 MG/DL

## 2022-06-28 PROCEDURE — 83036 HEMOGLOBIN GLYCOSYLATED A1C: CPT | Mod: ORL | Performed by: FAMILY MEDICINE

## 2022-06-28 PROCEDURE — 80061 LIPID PANEL: CPT | Mod: ORL | Performed by: FAMILY MEDICINE

## 2022-06-28 PROCEDURE — P9603 ONE-WAY ALLOW PRORATED MILES: HCPCS | Mod: ORL | Performed by: FAMILY MEDICINE

## 2022-06-28 PROCEDURE — 36415 COLL VENOUS BLD VENIPUNCTURE: CPT | Mod: ORL | Performed by: FAMILY MEDICINE

## 2022-07-08 ENCOUNTER — LAB REQUISITION (OUTPATIENT)
Dept: LAB | Facility: CLINIC | Age: 57
End: 2022-07-08
Payer: MEDICARE

## 2022-07-08 DIAGNOSIS — Z79.899 OTHER LONG TERM (CURRENT) DRUG THERAPY: ICD-10-CM

## 2022-07-08 LAB
ALBUMIN SERPL-MCNC: 3.2 G/DL (ref 3.4–5)
ALP SERPL-CCNC: 122 U/L (ref 40–150)
ALT SERPL W P-5'-P-CCNC: 14 U/L (ref 0–50)
ANION GAP SERPL CALCULATED.3IONS-SCNC: 9 MMOL/L (ref 3–14)
AST SERPL W P-5'-P-CCNC: 12 U/L (ref 0–45)
BILIRUB SERPL-MCNC: 0.2 MG/DL (ref 0.2–1.3)
BUN SERPL-MCNC: 14 MG/DL (ref 7–30)
CALCIUM SERPL-MCNC: 9.1 MG/DL (ref 8.5–10.1)
CHLORIDE BLD-SCNC: 104 MMOL/L (ref 94–109)
CHOLEST SERPL-MCNC: 97 MG/DL
CO2 SERPL-SCNC: 27 MMOL/L (ref 20–32)
CREAT SERPL-MCNC: 0.92 MG/DL (ref 0.52–1.04)
DEPRECATED CALCIDIOL+CALCIFEROL SERPL-MC: 46 UG/L (ref 20–75)
ERYTHROCYTE [DISTWIDTH] IN BLOOD BY AUTOMATED COUNT: 15.1 % (ref 10–15)
FASTING STATUS PATIENT QL REPORTED: ABNORMAL
GFR SERPL CREATININE-BSD FRML MDRD: 72 ML/MIN/1.73M2
GLUCOSE BLD-MCNC: 141 MG/DL (ref 70–99)
HBA1C MFR BLD: 5.9 % (ref 0–5.6)
HCT VFR BLD AUTO: 33.8 % (ref 35–47)
HDLC SERPL-MCNC: 34 MG/DL
HGB BLD-MCNC: 10.6 G/DL (ref 11.7–15.7)
LDLC SERPL CALC-MCNC: 27 MG/DL
MAGNESIUM SERPL-MCNC: 1.8 MG/DL (ref 1.6–2.3)
MCH RBC QN AUTO: 27.4 PG (ref 26.5–33)
MCHC RBC AUTO-ENTMCNC: 31.4 G/DL (ref 31.5–36.5)
MCV RBC AUTO: 87 FL (ref 78–100)
NONHDLC SERPL-MCNC: 63 MG/DL
PLATELET # BLD AUTO: 258 10E3/UL (ref 150–450)
POTASSIUM BLD-SCNC: 3.9 MMOL/L (ref 3.4–5.3)
PROT SERPL-MCNC: 7.4 G/DL (ref 6.8–8.8)
RBC # BLD AUTO: 3.87 10E6/UL (ref 3.8–5.2)
SODIUM SERPL-SCNC: 140 MMOL/L (ref 133–144)
TRIGL SERPL-MCNC: 178 MG/DL
TSH SERPL DL<=0.005 MIU/L-ACNC: 2.02 MU/L (ref 0.4–4)
WBC # BLD AUTO: 6.2 10E3/UL (ref 4–11)

## 2022-07-08 PROCEDURE — 36415 COLL VENOUS BLD VENIPUNCTURE: CPT | Mod: ORL | Performed by: FAMILY MEDICINE

## 2022-07-08 PROCEDURE — 82306 VITAMIN D 25 HYDROXY: CPT | Mod: ORL | Performed by: FAMILY MEDICINE

## 2022-07-08 PROCEDURE — 85027 COMPLETE CBC AUTOMATED: CPT | Mod: ORL | Performed by: FAMILY MEDICINE

## 2022-07-08 PROCEDURE — P9603 ONE-WAY ALLOW PRORATED MILES: HCPCS | Mod: ORL | Performed by: FAMILY MEDICINE

## 2022-07-08 PROCEDURE — 83036 HEMOGLOBIN GLYCOSYLATED A1C: CPT | Mod: ORL | Performed by: FAMILY MEDICINE

## 2022-07-08 PROCEDURE — 80053 COMPREHEN METABOLIC PANEL: CPT | Mod: ORL | Performed by: FAMILY MEDICINE

## 2022-07-08 PROCEDURE — 80061 LIPID PANEL: CPT | Mod: ORL | Performed by: FAMILY MEDICINE

## 2022-07-08 PROCEDURE — 84443 ASSAY THYROID STIM HORMONE: CPT | Mod: ORL | Performed by: FAMILY MEDICINE

## 2022-07-08 PROCEDURE — 83735 ASSAY OF MAGNESIUM: CPT | Mod: ORL | Performed by: FAMILY MEDICINE

## 2023-02-23 PROCEDURE — 81001 URINALYSIS AUTO W/SCOPE: CPT | Mod: ORL | Performed by: FAMILY MEDICINE

## 2023-02-23 PROCEDURE — 87088 URINE BACTERIA CULTURE: CPT | Mod: ORL | Performed by: FAMILY MEDICINE

## 2023-02-24 ENCOUNTER — LAB REQUISITION (OUTPATIENT)
Dept: LAB | Facility: CLINIC | Age: 58
End: 2023-02-24
Payer: MEDICARE

## 2023-02-24 DIAGNOSIS — Z79.899 OTHER LONG TERM (CURRENT) DRUG THERAPY: ICD-10-CM

## 2023-02-24 LAB
ALBUMIN UR-MCNC: 100 MG/DL
AMORPH CRY #/AREA URNS HPF: ABNORMAL /HPF
APPEARANCE UR: CLEAR
BACTERIA #/AREA URNS HPF: ABNORMAL /HPF
BILIRUB UR QL STRIP: NEGATIVE
COLOR UR AUTO: YELLOW
GLUCOSE UR STRIP-MCNC: NEGATIVE MG/DL
HGB UR QL STRIP: ABNORMAL
HYALINE CASTS: 1 /LPF
KETONES UR STRIP-MCNC: NEGATIVE MG/DL
LEUKOCYTE ESTERASE UR QL STRIP: ABNORMAL
MUCOUS THREADS #/AREA URNS LPF: PRESENT /LPF
NITRATE UR QL: POSITIVE
PH UR STRIP: 6 [PH] (ref 5–7)
RBC URINE: 1 /HPF
SP GR UR STRIP: 1.02 (ref 1–1.03)
UROBILINOGEN UR STRIP-MCNC: NORMAL MG/DL
WBC URINE: 92 /HPF

## 2023-02-26 LAB — BACTERIA UR CULT: ABNORMAL

## 2023-03-03 ENCOUNTER — LAB REQUISITION (OUTPATIENT)
Dept: LAB | Facility: CLINIC | Age: 58
End: 2023-03-03
Payer: MEDICARE

## 2023-03-03 DIAGNOSIS — Z79.899 OTHER LONG TERM (CURRENT) DRUG THERAPY: ICD-10-CM

## 2023-03-03 LAB
ALBUMIN SERPL BCG-MCNC: 3.7 G/DL (ref 3.5–5.2)
ALP SERPL-CCNC: 122 U/L (ref 35–104)
ALT SERPL W P-5'-P-CCNC: 10 U/L (ref 10–35)
ANION GAP SERPL CALCULATED.3IONS-SCNC: 12 MMOL/L (ref 7–15)
AST SERPL W P-5'-P-CCNC: 19 U/L (ref 10–35)
BILIRUB SERPL-MCNC: 0.3 MG/DL
BUN SERPL-MCNC: 20 MG/DL (ref 6–20)
CALCIUM SERPL-MCNC: 9.2 MG/DL (ref 8.6–10)
CHLORIDE SERPL-SCNC: 99 MMOL/L (ref 98–107)
CREAT SERPL-MCNC: 0.93 MG/DL (ref 0.51–0.95)
DEPRECATED HCO3 PLAS-SCNC: 26 MMOL/L (ref 22–29)
ERYTHROCYTE [DISTWIDTH] IN BLOOD BY AUTOMATED COUNT: 16 % (ref 10–15)
GFR SERPL CREATININE-BSD FRML MDRD: 71 ML/MIN/1.73M2
GLUCOSE SERPL-MCNC: 184 MG/DL (ref 70–99)
HBA1C MFR BLD: 5.7 %
HCT VFR BLD AUTO: 32.8 % (ref 35–47)
HGB BLD-MCNC: 10.3 G/DL (ref 11.7–15.7)
MAGNESIUM SERPL-MCNC: 1.2 MG/DL (ref 1.7–2.3)
MCH RBC QN AUTO: 27 PG (ref 26.5–33)
MCHC RBC AUTO-ENTMCNC: 31.4 G/DL (ref 31.5–36.5)
MCV RBC AUTO: 86 FL (ref 78–100)
PLATELET # BLD AUTO: 386 10E3/UL (ref 150–450)
POTASSIUM SERPL-SCNC: 4.2 MMOL/L (ref 3.4–5.3)
PROT SERPL-MCNC: 7.2 G/DL (ref 6.4–8.3)
RBC # BLD AUTO: 3.81 10E6/UL (ref 3.8–5.2)
SODIUM SERPL-SCNC: 137 MMOL/L (ref 136–145)
T4 FREE SERPL-MCNC: 1.49 NG/DL (ref 0.9–1.7)
TSH SERPL DL<=0.005 MIU/L-ACNC: 1.87 UIU/ML (ref 0.3–4.2)
WBC # BLD AUTO: 8 10E3/UL (ref 4–11)

## 2023-03-03 PROCEDURE — 84439 ASSAY OF FREE THYROXINE: CPT | Mod: ORL | Performed by: FAMILY MEDICINE

## 2023-03-03 PROCEDURE — 83735 ASSAY OF MAGNESIUM: CPT | Mod: ORL | Performed by: FAMILY MEDICINE

## 2023-03-03 PROCEDURE — 80053 COMPREHEN METABOLIC PANEL: CPT | Mod: ORL | Performed by: FAMILY MEDICINE

## 2023-03-03 PROCEDURE — 82306 VITAMIN D 25 HYDROXY: CPT | Mod: ORL | Performed by: FAMILY MEDICINE

## 2023-03-03 PROCEDURE — 84443 ASSAY THYROID STIM HORMONE: CPT | Mod: ORL | Performed by: FAMILY MEDICINE

## 2023-03-03 PROCEDURE — 36415 COLL VENOUS BLD VENIPUNCTURE: CPT | Mod: ORL | Performed by: FAMILY MEDICINE

## 2023-03-03 PROCEDURE — 85027 COMPLETE CBC AUTOMATED: CPT | Mod: ORL | Performed by: FAMILY MEDICINE

## 2023-03-03 PROCEDURE — 83036 HEMOGLOBIN GLYCOSYLATED A1C: CPT | Mod: ORL | Performed by: FAMILY MEDICINE

## 2023-03-05 LAB — DEPRECATED CALCIDIOL+CALCIFEROL SERPL-MC: 47 UG/L (ref 20–75)

## 2023-05-15 PROCEDURE — 81001 URINALYSIS AUTO W/SCOPE: CPT | Mod: ORL | Performed by: FAMILY MEDICINE

## 2023-05-15 PROCEDURE — 87088 URINE BACTERIA CULTURE: CPT | Mod: ORL | Performed by: FAMILY MEDICINE

## 2023-05-16 ENCOUNTER — TRANSFERRED RECORDS (OUTPATIENT)
Dept: HEALTH INFORMATION MANAGEMENT | Facility: CLINIC | Age: 58
End: 2023-05-16

## 2023-05-16 ENCOUNTER — LAB REQUISITION (OUTPATIENT)
Dept: LAB | Facility: CLINIC | Age: 58
End: 2023-05-16
Payer: MEDICARE

## 2023-05-16 DIAGNOSIS — Z79.899 OTHER LONG TERM (CURRENT) DRUG THERAPY: ICD-10-CM

## 2023-05-16 LAB
ALBUMIN UR-MCNC: 30 MG/DL
APPEARANCE UR: ABNORMAL
BACTERIA #/AREA URNS HPF: ABNORMAL /HPF
BILIRUB UR QL STRIP: NEGATIVE
COLOR UR AUTO: YELLOW
GLUCOSE UR STRIP-MCNC: NEGATIVE MG/DL
HGB UR QL STRIP: NEGATIVE
KETONES UR STRIP-MCNC: NEGATIVE MG/DL
LEUKOCYTE ESTERASE UR QL STRIP: ABNORMAL
MUCOUS THREADS #/AREA URNS LPF: PRESENT /LPF
NITRATE UR QL: NEGATIVE
PH UR STRIP: 5 [PH] (ref 5–7)
RBC URINE: 1 /HPF
RENAL TUB EPI: 1 /HPF
SP GR UR STRIP: 1.03 (ref 1–1.03)
SQUAMOUS EPITHELIAL: 10 /HPF
UROBILINOGEN UR STRIP-MCNC: NORMAL MG/DL
WBC CLUMPS #/AREA URNS HPF: PRESENT /HPF
WBC URINE: >182 /HPF

## 2023-05-18 LAB — BACTERIA UR CULT: ABNORMAL

## 2023-07-07 ENCOUNTER — LAB REQUISITION (OUTPATIENT)
Dept: LAB | Facility: CLINIC | Age: 58
End: 2023-07-07
Payer: MEDICARE

## 2023-07-07 DIAGNOSIS — E78.5 HYPERLIPIDEMIA, UNSPECIFIED: ICD-10-CM

## 2023-07-07 DIAGNOSIS — D64.9 ANEMIA, UNSPECIFIED: ICD-10-CM

## 2023-07-07 DIAGNOSIS — K21.9 GASTRO-ESOPHAGEAL REFLUX DISEASE WITHOUT ESOPHAGITIS: ICD-10-CM

## 2023-07-07 LAB
CHOLEST SERPL-MCNC: 101 MG/DL
ERYTHROCYTE [DISTWIDTH] IN BLOOD BY AUTOMATED COUNT: 15.3 % (ref 10–15)
HCT VFR BLD AUTO: 34.1 % (ref 35–47)
HDLC SERPL-MCNC: 39 MG/DL
HGB BLD-MCNC: 10.3 G/DL (ref 11.7–15.7)
LDLC SERPL CALC-MCNC: 39 MG/DL
MAGNESIUM SERPL-MCNC: 1.8 MG/DL (ref 1.7–2.3)
MCH RBC QN AUTO: 26.5 PG (ref 26.5–33)
MCHC RBC AUTO-ENTMCNC: 30.2 G/DL (ref 31.5–36.5)
MCV RBC AUTO: 88 FL (ref 78–100)
NONHDLC SERPL-MCNC: 62 MG/DL
PLATELET # BLD AUTO: 216 10E3/UL (ref 150–450)
RBC # BLD AUTO: 3.88 10E6/UL (ref 3.8–5.2)
TRIGL SERPL-MCNC: 113 MG/DL
WBC # BLD AUTO: 4.5 10E3/UL (ref 4–11)

## 2023-07-07 PROCEDURE — 36415 COLL VENOUS BLD VENIPUNCTURE: CPT | Mod: ORL | Performed by: FAMILY MEDICINE

## 2023-07-07 PROCEDURE — P9603 ONE-WAY ALLOW PRORATED MILES: HCPCS | Mod: ORL | Performed by: FAMILY MEDICINE

## 2023-07-07 PROCEDURE — 85027 COMPLETE CBC AUTOMATED: CPT | Mod: ORL | Performed by: FAMILY MEDICINE

## 2023-07-07 PROCEDURE — 83735 ASSAY OF MAGNESIUM: CPT | Mod: ORL | Performed by: FAMILY MEDICINE

## 2023-07-07 PROCEDURE — 80061 LIPID PANEL: CPT | Mod: ORL | Performed by: FAMILY MEDICINE

## 2023-07-28 ENCOUNTER — LAB REQUISITION (OUTPATIENT)
Dept: LAB | Facility: CLINIC | Age: 58
End: 2023-07-28
Payer: MEDICARE

## 2023-07-28 DIAGNOSIS — E83.42 HYPOMAGNESEMIA: ICD-10-CM

## 2023-07-28 LAB — MAGNESIUM SERPL-MCNC: 2 MG/DL (ref 1.7–2.3)

## 2023-07-28 PROCEDURE — 83735 ASSAY OF MAGNESIUM: CPT | Mod: ORL | Performed by: FAMILY MEDICINE

## 2023-07-28 PROCEDURE — P9603 ONE-WAY ALLOW PRORATED MILES: HCPCS | Mod: ORL | Performed by: FAMILY MEDICINE

## 2023-07-28 PROCEDURE — 36415 COLL VENOUS BLD VENIPUNCTURE: CPT | Mod: ORL | Performed by: FAMILY MEDICINE

## 2023-08-14 ENCOUNTER — MEDICAL CORRESPONDENCE (OUTPATIENT)
Dept: HEALTH INFORMATION MANAGEMENT | Facility: CLINIC | Age: 58
End: 2023-08-14
Payer: MEDICARE

## 2023-10-13 ENCOUNTER — LAB REQUISITION (OUTPATIENT)
Dept: LAB | Facility: CLINIC | Age: 58
End: 2023-10-13
Payer: MEDICARE

## 2023-10-13 DIAGNOSIS — F05 DELIRIUM DUE TO KNOWN PHYSIOLOGICAL CONDITION: ICD-10-CM

## 2023-10-13 LAB
ALBUMIN UR-MCNC: 30 MG/DL
APPEARANCE UR: ABNORMAL
BACTERIA #/AREA URNS HPF: ABNORMAL /HPF
BILIRUB UR QL STRIP: NEGATIVE
COLOR UR AUTO: YELLOW
GLUCOSE UR STRIP-MCNC: NEGATIVE MG/DL
HGB UR QL STRIP: ABNORMAL
KETONES UR STRIP-MCNC: NEGATIVE MG/DL
LEUKOCYTE ESTERASE UR QL STRIP: ABNORMAL
MUCOUS THREADS #/AREA URNS LPF: PRESENT /LPF
NITRATE UR QL: NEGATIVE
PH UR STRIP: 5 [PH] (ref 5–7)
RBC URINE: 2 /HPF
SP GR UR STRIP: 1.02 (ref 1–1.03)
SQUAMOUS EPITHELIAL: 1 /HPF
UROBILINOGEN UR STRIP-MCNC: NORMAL MG/DL
WBC URINE: 124 /HPF

## 2023-10-13 PROCEDURE — 81001 URINALYSIS AUTO W/SCOPE: CPT | Mod: ORL | Performed by: FAMILY MEDICINE

## 2023-10-13 PROCEDURE — 87086 URINE CULTURE/COLONY COUNT: CPT | Mod: ORL | Performed by: FAMILY MEDICINE

## 2023-10-15 LAB — BACTERIA UR CULT: ABNORMAL

## 2023-10-20 ENCOUNTER — LAB REQUISITION (OUTPATIENT)
Dept: LAB | Facility: CLINIC | Age: 58
End: 2023-10-20
Payer: MEDICARE

## 2023-10-20 DIAGNOSIS — N39.0 URINARY TRACT INFECTION, SITE NOT SPECIFIED: ICD-10-CM

## 2023-10-20 DIAGNOSIS — E78.5 HYPERLIPIDEMIA, UNSPECIFIED: ICD-10-CM

## 2023-10-20 DIAGNOSIS — E11.9 TYPE 2 DIABETES MELLITUS WITHOUT COMPLICATIONS (H): ICD-10-CM

## 2023-10-20 LAB
BASO+EOS+MONOS # BLD AUTO: ABNORMAL 10*3/UL
BASO+EOS+MONOS NFR BLD AUTO: ABNORMAL %
BASOPHILS # BLD AUTO: 0 10E3/UL (ref 0–0.2)
BASOPHILS NFR BLD AUTO: 0 %
CHOLEST SERPL-MCNC: 95 MG/DL
EOSINOPHIL # BLD AUTO: 0.2 10E3/UL (ref 0–0.7)
EOSINOPHIL NFR BLD AUTO: 3 %
ERYTHROCYTE [DISTWIDTH] IN BLOOD BY AUTOMATED COUNT: 15.2 % (ref 10–15)
HBA1C MFR BLD: 5.7 %
HCT VFR BLD AUTO: 31.7 % (ref 35–47)
HDLC SERPL-MCNC: 48 MG/DL
HGB BLD-MCNC: 9.6 G/DL (ref 11.7–15.7)
IMM GRANULOCYTES # BLD: 0.1 10E3/UL
IMM GRANULOCYTES NFR BLD: 1 %
LDLC SERPL CALC-MCNC: 30 MG/DL
LYMPHOCYTES # BLD AUTO: 0.5 10E3/UL (ref 0.8–5.3)
LYMPHOCYTES NFR BLD AUTO: 10 %
MCH RBC QN AUTO: 27 PG (ref 26.5–33)
MCHC RBC AUTO-ENTMCNC: 30.3 G/DL (ref 31.5–36.5)
MCV RBC AUTO: 89 FL (ref 78–100)
MONOCYTES # BLD AUTO: 0.3 10E3/UL (ref 0–1.3)
MONOCYTES NFR BLD AUTO: 7 %
NEUTROPHILS # BLD AUTO: 3.9 10E3/UL (ref 1.6–8.3)
NEUTROPHILS NFR BLD AUTO: 79 %
NONHDLC SERPL-MCNC: 47 MG/DL
NRBC # BLD AUTO: 0 10E3/UL
NRBC BLD AUTO-RTO: 0 /100
PLATELET # BLD AUTO: 225 10E3/UL (ref 150–450)
RBC # BLD AUTO: 3.56 10E6/UL (ref 3.8–5.2)
TRIGL SERPL-MCNC: 87 MG/DL
WBC # BLD AUTO: 4.9 10E3/UL (ref 4–11)

## 2023-10-20 PROCEDURE — P9603 ONE-WAY ALLOW PRORATED MILES: HCPCS | Mod: ORL | Performed by: FAMILY MEDICINE

## 2023-10-20 PROCEDURE — 83036 HEMOGLOBIN GLYCOSYLATED A1C: CPT | Mod: ORL | Performed by: FAMILY MEDICINE

## 2023-10-20 PROCEDURE — 36415 COLL VENOUS BLD VENIPUNCTURE: CPT | Mod: ORL | Performed by: FAMILY MEDICINE

## 2023-10-20 PROCEDURE — 80061 LIPID PANEL: CPT | Mod: ORL | Performed by: FAMILY MEDICINE

## 2023-10-20 PROCEDURE — 85025 COMPLETE CBC W/AUTO DIFF WBC: CPT | Mod: ORL | Performed by: FAMILY MEDICINE

## 2023-11-20 PROCEDURE — 87086 URINE CULTURE/COLONY COUNT: CPT | Mod: ORL | Performed by: FAMILY MEDICINE

## 2023-11-20 PROCEDURE — 81003 URINALYSIS AUTO W/O SCOPE: CPT | Mod: ORL | Performed by: FAMILY MEDICINE

## 2023-11-21 ENCOUNTER — LAB REQUISITION (OUTPATIENT)
Dept: LAB | Facility: CLINIC | Age: 58
End: 2023-11-21
Payer: MEDICARE

## 2023-11-21 DIAGNOSIS — N39.0 URINARY TRACT INFECTION, SITE NOT SPECIFIED: ICD-10-CM

## 2023-11-21 LAB
ALBUMIN UR-MCNC: 30 MG/DL
APPEARANCE UR: ABNORMAL
BILIRUB UR QL STRIP: NEGATIVE
COLOR UR AUTO: YELLOW
GLUCOSE UR STRIP-MCNC: NEGATIVE MG/DL
HGB UR QL STRIP: NEGATIVE
KETONES UR STRIP-MCNC: NEGATIVE MG/DL
LEUKOCYTE ESTERASE UR QL STRIP: ABNORMAL
NITRATE UR QL: POSITIVE
PH UR STRIP: 7 [PH] (ref 5–7)
SP GR UR STRIP: 1.01 (ref 1–1.03)
UROBILINOGEN UR STRIP-MCNC: NORMAL MG/DL

## 2023-11-22 LAB — BACTERIA UR CULT: ABNORMAL

## 2023-12-05 ENCOUNTER — LAB REQUISITION (OUTPATIENT)
Dept: LAB | Facility: CLINIC | Age: 58
End: 2023-12-05
Payer: MEDICARE

## 2023-12-05 DIAGNOSIS — K21.00 GASTRO-ESOPHAGEAL REFLUX DISEASE WITH ESOPHAGITIS, WITHOUT BLEEDING: ICD-10-CM

## 2023-12-05 DIAGNOSIS — F20.9 SCHIZOPHRENIA, UNSPECIFIED (H): ICD-10-CM

## 2023-12-05 DIAGNOSIS — D62 ACUTE POSTHEMORRHAGIC ANEMIA: ICD-10-CM

## 2023-12-05 DIAGNOSIS — K50.90 CROHN'S DISEASE, UNSPECIFIED, WITHOUT COMPLICATIONS (H): ICD-10-CM

## 2023-12-05 DIAGNOSIS — E55.9 VITAMIN D DEFICIENCY, UNSPECIFIED: ICD-10-CM

## 2023-12-05 LAB
ALBUMIN SERPL BCG-MCNC: 4.2 G/DL (ref 3.5–5.2)
ALP SERPL-CCNC: 107 U/L (ref 40–150)
ALT SERPL W P-5'-P-CCNC: 12 U/L (ref 0–50)
ANION GAP SERPL CALCULATED.3IONS-SCNC: 16 MMOL/L (ref 7–15)
AST SERPL W P-5'-P-CCNC: 26 U/L (ref 0–45)
BILIRUB SERPL-MCNC: 0.3 MG/DL
BUN SERPL-MCNC: 21.5 MG/DL (ref 6–20)
CALCIUM SERPL-MCNC: 9.3 MG/DL (ref 8.6–10)
CHLORIDE SERPL-SCNC: 103 MMOL/L (ref 98–107)
CREAT SERPL-MCNC: 0.82 MG/DL (ref 0.51–0.95)
DEPRECATED HCO3 PLAS-SCNC: 21 MMOL/L (ref 22–29)
EGFRCR SERPLBLD CKD-EPI 2021: 82 ML/MIN/1.73M2
ERYTHROCYTE [DISTWIDTH] IN BLOOD BY AUTOMATED COUNT: 15.9 % (ref 10–15)
GLUCOSE SERPL-MCNC: 126 MG/DL (ref 70–99)
HBA1C MFR BLD: 5.7 %
HCT VFR BLD AUTO: 32.8 % (ref 35–47)
HGB BLD-MCNC: 10 G/DL (ref 11.7–15.7)
MAGNESIUM SERPL-MCNC: 1.8 MG/DL (ref 1.7–2.3)
MCH RBC QN AUTO: 26.5 PG (ref 26.5–33)
MCHC RBC AUTO-ENTMCNC: 30.5 G/DL (ref 31.5–36.5)
MCV RBC AUTO: 87 FL (ref 78–100)
PLATELET # BLD AUTO: 282 10E3/UL (ref 150–450)
POTASSIUM SERPL-SCNC: 4.4 MMOL/L (ref 3.4–5.3)
PROT SERPL-MCNC: 7.6 G/DL (ref 6.4–8.3)
RBC # BLD AUTO: 3.78 10E6/UL (ref 3.8–5.2)
SODIUM SERPL-SCNC: 140 MMOL/L (ref 135–145)
TSH SERPL DL<=0.005 MIU/L-ACNC: 3.17 UIU/ML (ref 0.3–4.2)
VIT D+METAB SERPL-MCNC: 76 NG/ML (ref 20–50)
WBC # BLD AUTO: 5.8 10E3/UL (ref 4–11)

## 2023-12-05 PROCEDURE — P9603 ONE-WAY ALLOW PRORATED MILES: HCPCS | Mod: ORL | Performed by: FAMILY MEDICINE

## 2023-12-05 PROCEDURE — 83036 HEMOGLOBIN GLYCOSYLATED A1C: CPT | Mod: ORL | Performed by: FAMILY MEDICINE

## 2023-12-05 PROCEDURE — 84443 ASSAY THYROID STIM HORMONE: CPT | Mod: ORL | Performed by: FAMILY MEDICINE

## 2023-12-05 PROCEDURE — 82306 VITAMIN D 25 HYDROXY: CPT | Mod: ORL | Performed by: FAMILY MEDICINE

## 2023-12-05 PROCEDURE — 83735 ASSAY OF MAGNESIUM: CPT | Mod: ORL | Performed by: FAMILY MEDICINE

## 2023-12-05 PROCEDURE — 80053 COMPREHEN METABOLIC PANEL: CPT | Mod: ORL | Performed by: FAMILY MEDICINE

## 2023-12-05 PROCEDURE — 36415 COLL VENOUS BLD VENIPUNCTURE: CPT | Mod: ORL | Performed by: FAMILY MEDICINE

## 2023-12-05 PROCEDURE — 85027 COMPLETE CBC AUTOMATED: CPT | Mod: ORL | Performed by: FAMILY MEDICINE

## 2024-02-27 ENCOUNTER — LAB REQUISITION (OUTPATIENT)
Dept: LAB | Facility: CLINIC | Age: 59
End: 2024-02-27
Payer: MEDICARE

## 2024-02-27 DIAGNOSIS — N39.0 URINARY TRACT INFECTION, SITE NOT SPECIFIED: ICD-10-CM

## 2024-03-19 ENCOUNTER — LAB REQUISITION (OUTPATIENT)
Dept: LAB | Facility: CLINIC | Age: 59
End: 2024-03-19
Payer: MEDICARE

## 2024-03-19 DIAGNOSIS — E11.51 TYPE 2 DIABETES MELLITUS WITH DIABETIC PERIPHERAL ANGIOPATHY WITHOUT GANGRENE (H): ICD-10-CM

## 2024-03-19 DIAGNOSIS — E55.9 VITAMIN D DEFICIENCY, UNSPECIFIED: ICD-10-CM

## 2024-03-19 DIAGNOSIS — F03.90 UNSPECIFIED DEMENTIA, UNSPECIFIED SEVERITY, WITHOUT BEHAVIORAL DISTURBANCE, PSYCHOTIC DISTURBANCE, MOOD DISTURBANCE, AND ANXIETY (H): ICD-10-CM

## 2024-03-19 DIAGNOSIS — K50.90 CROHN'S DISEASE, UNSPECIFIED, WITHOUT COMPLICATIONS (H): ICD-10-CM

## 2024-03-19 LAB
ANION GAP SERPL CALCULATED.3IONS-SCNC: 14 MMOL/L (ref 7–15)
BASOPHILS # BLD AUTO: 0 10E3/UL (ref 0–0.2)
BASOPHILS NFR BLD AUTO: 0 %
BUN SERPL-MCNC: 23 MG/DL (ref 8–23)
CALCIUM SERPL-MCNC: 9.4 MG/DL (ref 8.6–10)
CHLORIDE SERPL-SCNC: 104 MMOL/L (ref 98–107)
CREAT SERPL-MCNC: 0.98 MG/DL (ref 0.51–0.95)
DEPRECATED HCO3 PLAS-SCNC: 24 MMOL/L (ref 22–29)
EGFRCR SERPLBLD CKD-EPI 2021: 66 ML/MIN/1.73M2
EOSINOPHIL # BLD AUTO: 0.2 10E3/UL (ref 0–0.7)
EOSINOPHIL NFR BLD AUTO: 4 %
ERYTHROCYTE [DISTWIDTH] IN BLOOD BY AUTOMATED COUNT: 18.2 % (ref 10–15)
GLUCOSE SERPL-MCNC: 126 MG/DL (ref 70–99)
HCT VFR BLD AUTO: 31.8 % (ref 35–47)
HGB BLD-MCNC: 9.4 G/DL (ref 11.7–15.7)
IMM GRANULOCYTES # BLD: 0 10E3/UL
IMM GRANULOCYTES NFR BLD: 0 %
LYMPHOCYTES # BLD AUTO: 0.7 10E3/UL (ref 0.8–5.3)
LYMPHOCYTES NFR BLD AUTO: 13 %
MCH RBC QN AUTO: 26.3 PG (ref 26.5–33)
MCHC RBC AUTO-ENTMCNC: 29.6 G/DL (ref 31.5–36.5)
MCV RBC AUTO: 89 FL (ref 78–100)
MONOCYTES # BLD AUTO: 0.5 10E3/UL (ref 0–1.3)
MONOCYTES NFR BLD AUTO: 9 %
NEUTROPHILS # BLD AUTO: 3.6 10E3/UL (ref 1.6–8.3)
NEUTROPHILS NFR BLD AUTO: 73 %
NRBC # BLD AUTO: 0 10E3/UL
NRBC BLD AUTO-RTO: 0 /100
PLATELET # BLD AUTO: 393 10E3/UL (ref 150–450)
POTASSIUM SERPL-SCNC: 3.6 MMOL/L (ref 3.4–5.3)
RBC # BLD AUTO: 3.57 10E6/UL (ref 3.8–5.2)
SODIUM SERPL-SCNC: 142 MMOL/L (ref 135–145)
VIT B12 SERPL-MCNC: 1032 PG/ML (ref 232–1245)
VIT D+METAB SERPL-MCNC: 53 NG/ML (ref 20–50)
WBC # BLD AUTO: 5 10E3/UL (ref 4–11)

## 2024-03-19 PROCEDURE — 85025 COMPLETE CBC W/AUTO DIFF WBC: CPT | Mod: ORL | Performed by: FAMILY MEDICINE

## 2024-03-19 PROCEDURE — 82306 VITAMIN D 25 HYDROXY: CPT | Mod: ORL | Performed by: FAMILY MEDICINE

## 2024-03-19 PROCEDURE — 80048 BASIC METABOLIC PNL TOTAL CA: CPT | Mod: ORL | Performed by: FAMILY MEDICINE

## 2024-03-19 PROCEDURE — P9603 ONE-WAY ALLOW PRORATED MILES: HCPCS | Mod: ORL | Performed by: FAMILY MEDICINE

## 2024-03-19 PROCEDURE — 82607 VITAMIN B-12: CPT | Mod: ORL | Performed by: FAMILY MEDICINE

## 2024-03-19 PROCEDURE — 36415 COLL VENOUS BLD VENIPUNCTURE: CPT | Mod: ORL | Performed by: FAMILY MEDICINE

## 2024-04-26 ENCOUNTER — LAB REQUISITION (OUTPATIENT)
Dept: LAB | Facility: CLINIC | Age: 59
End: 2024-04-26
Payer: MEDICARE

## 2024-04-26 DIAGNOSIS — E03.9 HYPOTHYROIDISM, UNSPECIFIED: ICD-10-CM

## 2024-04-26 LAB — TSH SERPL DL<=0.005 MIU/L-ACNC: 5.63 UIU/ML (ref 0.3–4.2)

## 2024-04-26 PROCEDURE — 36415 COLL VENOUS BLD VENIPUNCTURE: CPT | Mod: ORL | Performed by: FAMILY MEDICINE

## 2024-04-26 PROCEDURE — 84443 ASSAY THYROID STIM HORMONE: CPT | Mod: ORL | Performed by: FAMILY MEDICINE

## 2024-04-26 PROCEDURE — P9603 ONE-WAY ALLOW PRORATED MILES: HCPCS | Mod: ORL | Performed by: FAMILY MEDICINE

## 2024-07-08 ENCOUNTER — TRANSFERRED RECORDS (OUTPATIENT)
Dept: HEALTH INFORMATION MANAGEMENT | Facility: CLINIC | Age: 59
End: 2024-07-08

## 2024-07-12 ENCOUNTER — LAB REQUISITION (OUTPATIENT)
Dept: LAB | Facility: CLINIC | Age: 59
End: 2024-07-12
Payer: MEDICARE

## 2024-07-12 DIAGNOSIS — Z79.01 LONG TERM (CURRENT) USE OF ANTICOAGULANTS: ICD-10-CM

## 2024-08-08 ENCOUNTER — TRANSCRIBE ORDERS (OUTPATIENT)
Dept: OTHER | Age: 59
End: 2024-08-08

## 2024-08-08 DIAGNOSIS — L60.2 ONYCHOGRYPHOSIS: Primary | ICD-10-CM

## 2024-08-08 DIAGNOSIS — M20.40 HAMMER TOE: ICD-10-CM

## 2024-08-08 DIAGNOSIS — L85.1 PLANTAR KERATOSIS, ACQUIRED: ICD-10-CM

## 2024-08-08 DIAGNOSIS — M79.675 PAIN IN TOE OF LEFT FOOT: ICD-10-CM

## 2024-08-08 DIAGNOSIS — M79.674 PAIN IN TOE OF RIGHT FOOT: ICD-10-CM

## 2024-08-08 DIAGNOSIS — R60.0 EDEMA OF EXTREMITIES: ICD-10-CM

## 2024-08-08 DIAGNOSIS — E11.59 TYPE 2 DIABETES MELLITUS WITH OTHER CIRCULATORY COMPLICATIONS (H): ICD-10-CM

## 2024-08-08 DIAGNOSIS — M21.619 BUNION: ICD-10-CM

## 2024-08-08 DIAGNOSIS — R26.2 DIFFICULTY WALKING: ICD-10-CM

## 2024-08-08 DIAGNOSIS — L84 CORNS AND CALLUS: ICD-10-CM

## 2024-09-27 ENCOUNTER — LAB REQUISITION (OUTPATIENT)
Dept: LAB | Facility: CLINIC | Age: 59
End: 2024-09-27
Payer: MEDICARE

## 2024-09-27 DIAGNOSIS — Z79.899 OTHER LONG TERM (CURRENT) DRUG THERAPY: ICD-10-CM

## 2024-09-27 LAB
ALBUMIN SERPL BCG-MCNC: 4.3 G/DL (ref 3.5–5.2)
ALP SERPL-CCNC: 116 U/L (ref 40–150)
ALT SERPL W P-5'-P-CCNC: 10 U/L (ref 0–50)
ANION GAP SERPL CALCULATED.3IONS-SCNC: 13 MMOL/L (ref 7–15)
AST SERPL W P-5'-P-CCNC: 17 U/L (ref 0–45)
BASOPHILS # BLD AUTO: 0 10E3/UL (ref 0–0.2)
BASOPHILS NFR BLD AUTO: 0 %
BILIRUB SERPL-MCNC: 0.3 MG/DL
BUN SERPL-MCNC: 26.2 MG/DL (ref 8–23)
CALCIUM SERPL-MCNC: 9.7 MG/DL (ref 8.8–10.4)
CHLORIDE SERPL-SCNC: 102 MMOL/L (ref 98–107)
CHOLEST SERPL-MCNC: 135 MG/DL
CREAT SERPL-MCNC: 1.18 MG/DL (ref 0.51–0.95)
EGFRCR SERPLBLD CKD-EPI 2021: 53 ML/MIN/1.73M2
EOSINOPHIL # BLD AUTO: 0.1 10E3/UL (ref 0–0.7)
EOSINOPHIL NFR BLD AUTO: 1 %
ERYTHROCYTE [DISTWIDTH] IN BLOOD BY AUTOMATED COUNT: 16.7 % (ref 10–15)
EST. AVERAGE GLUCOSE BLD GHB EST-MCNC: 114 MG/DL
FASTING STATUS PATIENT QL REPORTED: ABNORMAL
FASTING STATUS PATIENT QL REPORTED: ABNORMAL
GLUCOSE SERPL-MCNC: 140 MG/DL (ref 70–99)
HBA1C MFR BLD: 5.6 %
HCO3 SERPL-SCNC: 24 MMOL/L (ref 22–29)
HCT VFR BLD AUTO: 32.9 % (ref 35–47)
HDLC SERPL-MCNC: 38 MG/DL
HGB BLD-MCNC: 10.3 G/DL (ref 11.7–15.7)
IMM GRANULOCYTES # BLD: 0 10E3/UL
IMM GRANULOCYTES NFR BLD: 0 %
LDLC SERPL CALC-MCNC: 70 MG/DL
LYMPHOCYTES # BLD AUTO: 0.6 10E3/UL (ref 0.8–5.3)
LYMPHOCYTES NFR BLD AUTO: 9 %
MAGNESIUM SERPL-MCNC: 2 MG/DL (ref 1.7–2.3)
MCH RBC QN AUTO: 26.3 PG (ref 26.5–33)
MCHC RBC AUTO-ENTMCNC: 31.3 G/DL (ref 31.5–36.5)
MCV RBC AUTO: 84 FL (ref 78–100)
MONOCYTES # BLD AUTO: 0.4 10E3/UL (ref 0–1.3)
MONOCYTES NFR BLD AUTO: 6 %
NEUTROPHILS # BLD AUTO: 5.7 10E3/UL (ref 1.6–8.3)
NEUTROPHILS NFR BLD AUTO: 83 %
NONHDLC SERPL-MCNC: 97 MG/DL
NRBC # BLD AUTO: 0 10E3/UL
NRBC BLD AUTO-RTO: 0 /100
PLATELET # BLD AUTO: 295 10E3/UL (ref 150–450)
POTASSIUM SERPL-SCNC: 4.4 MMOL/L (ref 3.4–5.3)
PROT SERPL-MCNC: 8.5 G/DL (ref 6.4–8.3)
RBC # BLD AUTO: 3.92 10E6/UL (ref 3.8–5.2)
SODIUM SERPL-SCNC: 139 MMOL/L (ref 135–145)
T4 FREE SERPL-MCNC: 1.16 NG/DL (ref 0.9–1.7)
TRIGL SERPL-MCNC: 135 MG/DL
TSH SERPL DL<=0.005 MIU/L-ACNC: 2.42 UIU/ML (ref 0.3–4.2)
VIT D+METAB SERPL-MCNC: 43 NG/ML (ref 20–50)
WBC # BLD AUTO: 6.9 10E3/UL (ref 4–11)

## 2024-09-27 PROCEDURE — 84439 ASSAY OF FREE THYROXINE: CPT | Mod: ORL | Performed by: FAMILY MEDICINE

## 2024-09-27 PROCEDURE — 80061 LIPID PANEL: CPT | Mod: ORL | Performed by: FAMILY MEDICINE

## 2024-09-27 PROCEDURE — 85025 COMPLETE CBC W/AUTO DIFF WBC: CPT | Mod: ORL | Performed by: FAMILY MEDICINE

## 2024-09-27 PROCEDURE — 84425 ASSAY OF VITAMIN B-1: CPT | Mod: ORL | Performed by: FAMILY MEDICINE

## 2024-09-27 PROCEDURE — 83735 ASSAY OF MAGNESIUM: CPT | Mod: ORL | Performed by: FAMILY MEDICINE

## 2024-09-27 PROCEDURE — 82306 VITAMIN D 25 HYDROXY: CPT | Mod: ORL | Performed by: FAMILY MEDICINE

## 2024-09-27 PROCEDURE — P9603 ONE-WAY ALLOW PRORATED MILES: HCPCS | Mod: ORL | Performed by: FAMILY MEDICINE

## 2024-09-27 PROCEDURE — 83036 HEMOGLOBIN GLYCOSYLATED A1C: CPT | Mod: ORL | Performed by: FAMILY MEDICINE

## 2024-09-27 PROCEDURE — 84443 ASSAY THYROID STIM HORMONE: CPT | Mod: ORL | Performed by: FAMILY MEDICINE

## 2024-09-27 PROCEDURE — 80053 COMPREHEN METABOLIC PANEL: CPT | Mod: ORL | Performed by: FAMILY MEDICINE

## 2024-09-27 PROCEDURE — 36415 COLL VENOUS BLD VENIPUNCTURE: CPT | Mod: ORL | Performed by: FAMILY MEDICINE

## 2024-10-02 LAB — VIT B1 PYROPHOSHATE BLD-SCNC: 117 NMOL/L

## 2024-11-05 ENCOUNTER — LAB REQUISITION (OUTPATIENT)
Dept: LAB | Facility: CLINIC | Age: 59
End: 2024-11-05
Payer: MEDICARE

## 2024-11-05 DIAGNOSIS — N39.0 URINARY TRACT INFECTION, SITE NOT SPECIFIED: ICD-10-CM

## 2024-11-05 LAB
ALBUMIN UR-MCNC: NEGATIVE MG/DL
APPEARANCE UR: CLEAR
BILIRUB UR QL STRIP: NEGATIVE
COLOR UR AUTO: YELLOW
GLUCOSE UR STRIP-MCNC: NEGATIVE MG/DL
HGB UR QL STRIP: NEGATIVE
KETONES UR STRIP-MCNC: NEGATIVE MG/DL
LEUKOCYTE ESTERASE UR QL STRIP: ABNORMAL
MUCOUS THREADS #/AREA URNS LPF: PRESENT /LPF
NITRATE UR QL: NEGATIVE
PH UR STRIP: 5 [PH] (ref 5–7)
RBC URINE: 1 /HPF
SP GR UR STRIP: 1.03 (ref 1–1.03)
SQUAMOUS EPITHELIAL: 2 /HPF
UROBILINOGEN UR STRIP-MCNC: NORMAL MG/DL
WBC URINE: 5 /HPF

## 2024-11-14 PROCEDURE — 81003 URINALYSIS AUTO W/O SCOPE: CPT | Mod: ORL | Performed by: FAMILY MEDICINE

## 2024-11-15 ENCOUNTER — LAB REQUISITION (OUTPATIENT)
Dept: LAB | Facility: CLINIC | Age: 59
End: 2024-11-15
Payer: MEDICARE

## 2024-11-15 DIAGNOSIS — N39.0 URINARY TRACT INFECTION, SITE NOT SPECIFIED: ICD-10-CM

## 2024-11-15 DIAGNOSIS — Z79.01 LONG TERM (CURRENT) USE OF ANTICOAGULANTS: ICD-10-CM

## 2024-11-15 LAB
ALBUMIN UR-MCNC: NEGATIVE MG/DL
APPEARANCE UR: CLEAR
BILIRUB UR QL STRIP: NEGATIVE
COLOR UR AUTO: YELLOW
GLUCOSE UR STRIP-MCNC: NEGATIVE MG/DL
HGB UR QL STRIP: NEGATIVE
KETONES UR STRIP-MCNC: NEGATIVE MG/DL
LEUKOCYTE ESTERASE UR QL STRIP: NEGATIVE
NITRATE UR QL: NEGATIVE
PH UR STRIP: 7 [PH] (ref 5–7)
SP GR UR STRIP: 1.01 (ref 1–1.03)
UROBILINOGEN UR STRIP-MCNC: NORMAL MG/DL

## 2025-01-17 ENCOUNTER — LAB REQUISITION (OUTPATIENT)
Dept: LAB | Facility: CLINIC | Age: 60
End: 2025-01-17
Payer: MEDICARE

## 2025-01-17 DIAGNOSIS — Z79.01 LONG TERM (CURRENT) USE OF ANTICOAGULANTS: ICD-10-CM

## 2025-01-17 LAB
ANION GAP SERPL CALCULATED.3IONS-SCNC: 11 MMOL/L (ref 7–15)
BUN SERPL-MCNC: 20.9 MG/DL (ref 8–23)
CALCIUM SERPL-MCNC: 9 MG/DL (ref 8.8–10.4)
CHLORIDE SERPL-SCNC: 104 MMOL/L (ref 98–107)
CREAT SERPL-MCNC: 0.9 MG/DL (ref 0.51–0.95)
EGFRCR SERPLBLD CKD-EPI 2021: 73 ML/MIN/1.73M2
EST. AVERAGE GLUCOSE BLD GHB EST-MCNC: 108 MG/DL
GLUCOSE SERPL-MCNC: 177 MG/DL (ref 70–99)
HBA1C MFR BLD: 5.4 %
HCO3 SERPL-SCNC: 23 MMOL/L (ref 22–29)
POTASSIUM SERPL-SCNC: 4 MMOL/L (ref 3.4–5.3)
SODIUM SERPL-SCNC: 138 MMOL/L (ref 135–145)

## 2025-01-17 PROCEDURE — 83036 HEMOGLOBIN GLYCOSYLATED A1C: CPT | Mod: ORL | Performed by: FAMILY MEDICINE

## 2025-01-17 PROCEDURE — P9603 ONE-WAY ALLOW PRORATED MILES: HCPCS | Mod: ORL | Performed by: FAMILY MEDICINE

## 2025-01-17 PROCEDURE — 80048 BASIC METABOLIC PNL TOTAL CA: CPT | Mod: ORL | Performed by: FAMILY MEDICINE

## 2025-01-17 PROCEDURE — 36415 COLL VENOUS BLD VENIPUNCTURE: CPT | Mod: ORL | Performed by: FAMILY MEDICINE

## 2025-03-11 ENCOUNTER — LAB REQUISITION (OUTPATIENT)
Dept: LAB | Facility: CLINIC | Age: 60
End: 2025-03-11
Payer: COMMERCIAL

## 2025-03-11 DIAGNOSIS — I10 ESSENTIAL (PRIMARY) HYPERTENSION: ICD-10-CM

## 2025-03-11 LAB — TSH SERPL DL<=0.005 MIU/L-ACNC: 2.18 UIU/ML (ref 0.3–4.2)

## 2025-03-11 PROCEDURE — 84443 ASSAY THYROID STIM HORMONE: CPT | Mod: ORL | Performed by: FAMILY MEDICINE

## 2025-03-11 PROCEDURE — 36415 COLL VENOUS BLD VENIPUNCTURE: CPT | Mod: ORL | Performed by: FAMILY MEDICINE

## 2025-03-11 PROCEDURE — P9603 ONE-WAY ALLOW PRORATED MILES: HCPCS | Mod: ORL | Performed by: FAMILY MEDICINE

## 2025-03-13 ENCOUNTER — LAB REQUISITION (OUTPATIENT)
Dept: LAB | Facility: CLINIC | Age: 60
End: 2025-03-13
Payer: MEDICARE

## 2025-03-13 DIAGNOSIS — I10 ESSENTIAL (PRIMARY) HYPERTENSION: ICD-10-CM

## 2025-03-14 LAB
ALBUMIN SERPL BCG-MCNC: 3.9 G/DL (ref 3.5–5.2)
ALP SERPL-CCNC: 92 U/L (ref 40–150)
ALT SERPL W P-5'-P-CCNC: 9 U/L (ref 0–50)
AST SERPL W P-5'-P-CCNC: 17 U/L (ref 0–45)
BILIRUB DIRECT SERPL-MCNC: 0.1 MG/DL (ref 0–0.3)
BILIRUB SERPL-MCNC: 0.2 MG/DL
PLATELET # BLD AUTO: 260 10E3/UL (ref 150–450)
PROT SERPL-MCNC: 6.9 G/DL (ref 6.4–8.3)
VALPROATE SERPL-MCNC: 42.2 UG/ML

## 2025-03-14 PROCEDURE — 85049 AUTOMATED PLATELET COUNT: CPT | Mod: ORL | Performed by: FAMILY MEDICINE

## 2025-03-14 PROCEDURE — 80076 HEPATIC FUNCTION PANEL: CPT | Mod: ORL | Performed by: FAMILY MEDICINE

## 2025-03-14 PROCEDURE — 36415 COLL VENOUS BLD VENIPUNCTURE: CPT | Mod: ORL | Performed by: FAMILY MEDICINE

## 2025-03-14 PROCEDURE — 80164 ASSAY DIPROPYLACETIC ACD TOT: CPT | Mod: ORL | Performed by: FAMILY MEDICINE

## 2025-04-11 ENCOUNTER — LAB REQUISITION (OUTPATIENT)
Dept: LAB | Facility: CLINIC | Age: 60
End: 2025-04-11
Payer: MEDICARE

## 2025-04-11 DIAGNOSIS — Z79.01 LONG TERM (CURRENT) USE OF ANTICOAGULANTS: ICD-10-CM

## 2025-04-11 LAB
BASOPHILS # BLD AUTO: 0 10E3/UL (ref 0–0.2)
BASOPHILS NFR BLD AUTO: 0 %
CHOLEST SERPL-MCNC: 119 MG/DL
EOSINOPHIL # BLD AUTO: 0.3 10E3/UL (ref 0–0.7)
EOSINOPHIL NFR BLD AUTO: 5 %
ERYTHROCYTE [DISTWIDTH] IN BLOOD BY AUTOMATED COUNT: 15.5 % (ref 10–15)
EST. AVERAGE GLUCOSE BLD GHB EST-MCNC: 114 MG/DL
FASTING STATUS PATIENT QL REPORTED: ABNORMAL
HBA1C MFR BLD: 5.6 %
HCT VFR BLD AUTO: 29.9 % (ref 35–47)
HDLC SERPL-MCNC: 49 MG/DL
HGB BLD-MCNC: 9.3 G/DL (ref 11.7–15.7)
IMM GRANULOCYTES # BLD: 0 10E3/UL
IMM GRANULOCYTES NFR BLD: 1 %
LDLC SERPL CALC-MCNC: 26 MG/DL
LYMPHOCYTES # BLD AUTO: 0.5 10E3/UL (ref 0.8–5.3)
LYMPHOCYTES NFR BLD AUTO: 10 %
MAGNESIUM SERPL-MCNC: 1.6 MG/DL (ref 1.7–2.3)
MCH RBC QN AUTO: 27.7 PG (ref 26.5–33)
MCHC RBC AUTO-ENTMCNC: 31.1 G/DL (ref 31.5–36.5)
MCV RBC AUTO: 89 FL (ref 78–100)
MONOCYTES # BLD AUTO: 0.4 10E3/UL (ref 0–1.3)
MONOCYTES NFR BLD AUTO: 7 %
NEUTROPHILS # BLD AUTO: 4.1 10E3/UL (ref 1.6–8.3)
NEUTROPHILS NFR BLD AUTO: 78 %
NONHDLC SERPL-MCNC: 70 MG/DL
NRBC # BLD AUTO: 0 10E3/UL
NRBC BLD AUTO-RTO: 0 /100
PLATELET # BLD AUTO: 250 10E3/UL (ref 150–450)
RBC # BLD AUTO: 3.36 10E6/UL (ref 3.8–5.2)
T4 FREE SERPL-MCNC: 1.06 NG/DL (ref 0.9–1.7)
TRIGL SERPL-MCNC: 219 MG/DL
TSH SERPL DL<=0.005 MIU/L-ACNC: 4.84 UIU/ML (ref 0.3–4.2)
VIT D+METAB SERPL-MCNC: 45 NG/ML (ref 20–50)
WBC # BLD AUTO: 5.3 10E3/UL (ref 4–11)

## 2025-04-11 PROCEDURE — 84439 ASSAY OF FREE THYROXINE: CPT | Mod: ORL | Performed by: FAMILY MEDICINE

## 2025-04-11 PROCEDURE — 82306 VITAMIN D 25 HYDROXY: CPT | Mod: ORL | Performed by: FAMILY MEDICINE

## 2025-04-11 PROCEDURE — P9603 ONE-WAY ALLOW PRORATED MILES: HCPCS | Mod: ORL | Performed by: FAMILY MEDICINE

## 2025-04-11 PROCEDURE — 80164 ASSAY DIPROPYLACETIC ACD TOT: CPT | Mod: ORL | Performed by: FAMILY MEDICINE

## 2025-04-11 PROCEDURE — 36415 COLL VENOUS BLD VENIPUNCTURE: CPT | Mod: ORL | Performed by: FAMILY MEDICINE

## 2025-04-11 PROCEDURE — 84443 ASSAY THYROID STIM HORMONE: CPT | Mod: ORL | Performed by: FAMILY MEDICINE

## 2025-04-11 PROCEDURE — 83036 HEMOGLOBIN GLYCOSYLATED A1C: CPT | Mod: ORL | Performed by: FAMILY MEDICINE

## 2025-04-11 PROCEDURE — 83735 ASSAY OF MAGNESIUM: CPT | Mod: ORL | Performed by: FAMILY MEDICINE

## 2025-04-11 PROCEDURE — 85025 COMPLETE CBC W/AUTO DIFF WBC: CPT | Mod: ORL | Performed by: FAMILY MEDICINE

## 2025-04-11 PROCEDURE — 80061 LIPID PANEL: CPT | Mod: ORL | Performed by: FAMILY MEDICINE

## 2025-04-13 LAB
VALPROATE FREE MFR SERPL: ABNORMAL %
VALPROATE FREE SERPL-MCNC: <7 UG/ML
VALPROATE SERPL-MCNC: 13 UG/ML

## 2025-05-01 ENCOUNTER — LAB REQUISITION (OUTPATIENT)
Dept: LAB | Facility: CLINIC | Age: 60
End: 2025-05-01
Payer: MEDICARE

## 2025-05-01 DIAGNOSIS — Z79.01 LONG TERM (CURRENT) USE OF ANTICOAGULANTS: ICD-10-CM

## 2025-05-02 LAB
ANION GAP SERPL CALCULATED.3IONS-SCNC: 13 MMOL/L (ref 7–15)
BUN SERPL-MCNC: 17.5 MG/DL (ref 8–23)
CALCIUM SERPL-MCNC: 9.6 MG/DL (ref 8.8–10.4)
CHLORIDE SERPL-SCNC: 102 MMOL/L (ref 98–107)
CREAT SERPL-MCNC: 0.92 MG/DL (ref 0.51–0.95)
EGFRCR SERPLBLD CKD-EPI 2021: 71 ML/MIN/1.73M2
EST. AVERAGE GLUCOSE BLD GHB EST-MCNC: 111 MG/DL
GLUCOSE SERPL-MCNC: 93 MG/DL (ref 70–99)
HBA1C MFR BLD: 5.5 %
HCO3 SERPL-SCNC: 26 MMOL/L (ref 22–29)
POTASSIUM SERPL-SCNC: 4.4 MMOL/L (ref 3.4–5.3)
SODIUM SERPL-SCNC: 141 MMOL/L (ref 135–145)

## 2025-05-02 PROCEDURE — 83036 HEMOGLOBIN GLYCOSYLATED A1C: CPT | Mod: ORL | Performed by: FAMILY MEDICINE

## 2025-05-02 PROCEDURE — P9603 ONE-WAY ALLOW PRORATED MILES: HCPCS | Mod: ORL | Performed by: FAMILY MEDICINE

## 2025-05-02 PROCEDURE — 36415 COLL VENOUS BLD VENIPUNCTURE: CPT | Mod: ORL | Performed by: FAMILY MEDICINE

## 2025-05-02 PROCEDURE — 80048 BASIC METABOLIC PNL TOTAL CA: CPT | Mod: ORL | Performed by: FAMILY MEDICINE

## 2025-06-12 ENCOUNTER — LAB REQUISITION (OUTPATIENT)
Dept: LAB | Facility: CLINIC | Age: 60
End: 2025-06-12
Payer: MEDICARE

## 2025-06-12 DIAGNOSIS — Z79.01 LONG TERM (CURRENT) USE OF ANTICOAGULANTS: ICD-10-CM

## 2025-06-17 LAB
MAGNESIUM SERPL-MCNC: 1.8 MG/DL (ref 1.7–2.3)
T4 FREE SERPL-MCNC: 1.04 NG/DL (ref 0.9–1.7)
TSH SERPL DL<=0.005 MIU/L-ACNC: 3.68 UIU/ML (ref 0.3–4.2)
VALPROATE SERPL-MCNC: 24.3 UG/ML (ref 50–100)
VIT D+METAB SERPL-MCNC: 38 NG/ML (ref 20–50)

## 2025-06-17 PROCEDURE — 36415 COLL VENOUS BLD VENIPUNCTURE: CPT | Mod: ORL | Performed by: FAMILY MEDICINE

## 2025-06-17 PROCEDURE — 80164 ASSAY DIPROPYLACETIC ACD TOT: CPT | Mod: ORL | Performed by: FAMILY MEDICINE

## 2025-06-17 PROCEDURE — 84439 ASSAY OF FREE THYROXINE: CPT | Mod: ORL | Performed by: FAMILY MEDICINE

## 2025-06-17 PROCEDURE — 82306 VITAMIN D 25 HYDROXY: CPT | Mod: ORL | Performed by: FAMILY MEDICINE

## 2025-06-17 PROCEDURE — 83735 ASSAY OF MAGNESIUM: CPT | Mod: ORL | Performed by: FAMILY MEDICINE

## 2025-06-17 PROCEDURE — 84443 ASSAY THYROID STIM HORMONE: CPT | Mod: ORL | Performed by: FAMILY MEDICINE
